# Patient Record
Sex: MALE | Race: WHITE | NOT HISPANIC OR LATINO | Employment: UNEMPLOYED | URBAN - METROPOLITAN AREA
[De-identification: names, ages, dates, MRNs, and addresses within clinical notes are randomized per-mention and may not be internally consistent; named-entity substitution may affect disease eponyms.]

---

## 2017-06-14 ENCOUNTER — HOSPITAL ENCOUNTER (EMERGENCY)
Facility: MEDICAL CENTER | Age: 52
End: 2017-06-14
Attending: EMERGENCY MEDICINE

## 2017-06-14 VITALS
OXYGEN SATURATION: 96 % | WEIGHT: 127.43 LBS | HEIGHT: 70 IN | RESPIRATION RATE: 15 BRPM | SYSTOLIC BLOOD PRESSURE: 164 MMHG | TEMPERATURE: 96.8 F | BODY MASS INDEX: 18.24 KG/M2 | HEART RATE: 101 BPM | DIASTOLIC BLOOD PRESSURE: 84 MMHG
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
## 2017-06-14 DIAGNOSIS — L03.113 CELLULITIS OF RIGHT UPPER EXTREMITY: ICD-10-CM

## 2017-06-14 PROCEDURE — 99283 EMERGENCY DEPT VISIT LOW MDM: CPT

## 2017-06-14 RX ORDER — AMOXICILLIN 500 MG/1
500 CAPSULE ORAL 3 TIMES DAILY
Qty: 21 CAP | Refills: 0 | Status: SHIPPED | OUTPATIENT
Start: 2017-06-14 | End: 2017-06-21

## 2017-06-14 RX ORDER — BENZOCAINE/MENTHOL 6 MG-10 MG
1 LOZENGE MUCOUS MEMBRANE 2 TIMES DAILY
Qty: 1 TUBE | Refills: 0 | Status: SHIPPED | OUTPATIENT
Start: 2017-06-14 | End: 2017-12-28

## 2017-06-14 RX ORDER — SULFAMETHOXAZOLE AND TRIMETHOPRIM 800; 160 MG/1; MG/1
1 TABLET ORAL 2 TIMES DAILY
Qty: 14 TAB | Refills: 0 | Status: SHIPPED | OUTPATIENT
Start: 2017-06-14 | End: 2017-06-21

## 2017-06-14 ASSESSMENT — LIFESTYLE VARIABLES: DO YOU DRINK ALCOHOL: NO

## 2017-06-14 ASSESSMENT — PAIN SCALES - GENERAL: PAINLEVEL_OUTOF10: 3

## 2017-06-14 NOTE — ED PROVIDER NOTES
"ED Provider Note    Scribed for Vera Elena M.D. by Jessie Hussein. 6/14/2017  6:12 AM    Primary care provider: Pcp Pt States None  Means of arrival: Walk-in  History obtained from: Patient  History limited by: None    CHIEF COMPLAINT  Chief Complaint   Patient presents with   • Bug Bite     Pt first noticed bite on Sunday; increased erythema to right medial upper arm, edema and buring sensation.       HPI  Jhon Aguirre is a 52 y.o. male who presents to the Emergency Department for evaluation of bug bite to his right upper arm onset four days ago. Per patient, he was working at the [employer] up [place] when a wasp bit him twice. He reports the initially feeling \"burning\" but now area just itches constantly. Patient states it burns a little when he touches the area. He reports the area turning red and spreading across his upper right arm yesterday so he came into the ED for further evaluation. Patient denies any recent fevers. He is not allergic to any medications.     REVIEW OF SYSTEMS  HEENT:  No ear pain, congestion, or sore throat   EYES: no discharge, redness, or vision changes  CARDIAC: no chest pain, no palpitations    PULMONARY: no dyspnea, cough, or congestion   GI: no vomiting, diarrhea, or abdominal pain   : no dysuria, back pain, or hematuria   Neuro: no weakness, numbness, aphasia, or headache  Musculoskeletal: no swelling, deformity, pain, or joint swelling  Endocrine: no fevers, sweating, or weight loss   SKIN: erythema to right upper arm and ithcing, no rash or contusions     See history of present illness.     PAST MEDICAL HISTORY   has a past medical history of Renal failure (2010); Pain; and Dental disorder.    SURGICAL HISTORY   has past surgical history that includes other; radius ulna orif (10/23/2011); and inguinal hernia repair (3/14/2014).    SOCIAL HISTORY  Social History   Substance Use Topics   • Smoking status: Current Every Day Smoker -- 0.50 packs/day for 20 years " "    Types: Cigarettes   • Smokeless tobacco: Never Used   • Alcohol Use: No      History   Drug Use No       FAMILY HISTORY  No pertinent family history    CURRENT MEDICATIONS  Home Medications     Reviewed by Chitra Augustine R.N. (Registered Nurse) on 06/14/17 at 0611  Med List Status: <None>    Medication Last Dose Status    Ibuprofen-Diphenhydramine Cit (ADVIL PM PO) Not Taking Active    naproxen (ALEVE) 220 MG tablet Not Taking Active    oxycodone-acetaminophen (PERCOCET) 7.5-325 MG per tablet 3/15/2014 Active                ALLERGIES  No Known Allergies    PHYSICAL EXAM  VITAL SIGNS: /84 mmHg  Pulse 101  Temp(Src) 36 °C (96.8 °F)  Resp 15  Ht 1.778 m (5' 10\")  Wt 57.8 kg (127 lb 6.8 oz)  BMI 18.28 kg/m2  SpO2 96%    Constitutional: Well developed, Well nourished, No acute distress, Non-toxic appearance.   HEENT: Normocephalic, Atraumatic,  external ears normal, pharynx pink,  Mucous  Membranes moist, No rhinorrhea or mucosal edema  Eyes: PERRL, EOMI, Conjunctiva normal, No discharge.   Neck: Normal range of motion, No tenderness, Supple, No stridor.   Lymphatic: No lymphadenopathy    Cardiovascular: Regular Rate and Rhythm, No murmurs,  rubs, or gallops.   Thorax & Lungs: Lungs clear to auscultation bilaterally, No respiratory distress, No wheezes, rhales or rhonchi, No chest wall tenderness.   Abdomen: Bowel sounds normal, Soft, non tender, non distended,  No pulsatile masses., no rebound guarding or peritoneal signs.   Skin: Right upper medial arm side of bicep has 7 by 4cm area of erythema, it is raised and dry and warm. No fluctuant, no purulent drainage. Bite alan more distal on elbow also no fluctuant or purulent drainage, no joint swelling.   Extremities: Equal, intact distal pulses, No cyanosis, No tenderness.   Musculoskeletal: Good range of motion in all major joints. No tenderness to palpation or major deformities noted.   Neurologic: Alert & awake, no focal deficits  Psychiatric: " Affect normal    COURSE & MEDICAL DECISION MAKING  Nursing notes, VS, PMSFHx reviewed in chart.     6:12 AM - Patient seen and examined at bedside. Patient has with symptoms consistent to secondary infection to insect bite. He will be discharged home with prescription for hydrocortisone 1% cream, 800-160mg Bactrim DS tablet and 500mg Amoxicillin for treatment. He was instructed to follow up care with [facility] or return to the ED if symptoms worsen. Patient understood and was in agreement with this discharge plan.     The patient will return for new or worsening symptoms and is stable at the time of discharge.    Patient has had high blood pressure while in the emergency department, felt likely secondary to medical condition. Counseled patient to monitor blood pressure at home and follow up with primary care physician.    DISPOSITION:  Patient will be discharged home in stable condition.    FOLLOW UP:  [address] [address] [phone]  Call in 1 day  to establish care, for recheck      OUTPATIENT MEDICATIONS:  Discharge Medication List as of 6/14/2017  6:18 AM      START taking these medications    Details   hydrocortisone 1 % Cream Apply 1 Each to affected area(s) 2 times a day., Disp-1 Tube, R-0, Print Rx Paper      sulfamethoxazole-trimethoprim (BACTRIM DS) 800-160 MG tablet Take 1 Tab by mouth 2 times a day for 7 days., Disp-14 Tab, R-0, Print Rx Paper      amoxicillin (AMOXIL) 500 MG Cap Take 1 Cap by mouth 3 times a day for 7 days., Disp-21 Cap, R-0, Print Rx Paper             FINAL IMPRESSION  1. Insect bite, initial encounter    2. Cellulitis of right upper extremity          Jessie MULTANI ([place]), am scribing for, and in the presence of, Vera Elena M.D..    Electronically signed by: Jessie Elder), 6/14/2017    Vera MULTANI M.D. personally performed the services described in this documentation, as scribed  by Jessie Hussein in my presence, and it is both accurate and complete.    The note accurately reflects work and decisions made by me.  Vera Elena  6/14/2017  12:57 PM

## 2017-06-14 NOTE — ED AVS SNAPSHOT
Home Care Instructions                                                                                                                Jhon [place]   MRN: [id number]    Department:  [facility], Emergency Dept   Date of Visit:  6/14/2017            [facility], Emergency Dept    [address] [address]    Phone:  [phone]      You were seen by     Vera Elena M.D.      Your Diagnosis Was     Insect bite, initial encounter     W57.XXXA       Follow-up Information     [facility].    Why:  to establish care, for recheck    Contact information    [address] [place] [address] [phone]    Additional information:    [facility]      Medication Information     Review all of your home medications and newly ordered medications with your primary doctor and/or pharmacist as soon as possible. Follow medication instructions as directed by your doctor and/or pharmacist.     Please keep your complete medication list with you and share with your physician. Update the information when medications are discontinued, doses are changed, or new medications (including over-the-counter products) are added; and carry medication information at all times in the event of emergency situations.               Medication List      START taking these medications        Instructions    Morning Afternoon Evening Bedtime    amoxicillin 500 MG Caps   Commonly known as:  AMOXIL        Take 1 Cap by mouth 3 times a day for 7 days.   Dose:  500 mg                        hydrocortisone 1 % Crea        Apply 1 Each to affected area(s) 2 times a day.   Dose:  1 Each                        sulfamethoxazole-trimethoprim 800-160 MG tablet   Commonly known as:  BACTRIM DS        Take 1 Tab by mouth 2 times a day for 7 days.   Dose:  1 Tab                          ASK your doctor about these medications        Instructions      Morning Afternoon Evening Bedtime    ADVIL PM PO        Take  by mouth every bedtime.                        ALEVE 220 MG tablet   Generic drug:  naproxen        Take 220 mg by mouth 2 times a day, with meals.   Dose:  220 mg                        oxycodone-acetaminophen 7.5-325 MG per tablet   Commonly known as:  PERCOCET        Take 1 Tab by mouth every four hours as needed.   Dose:  1 Tab                             Where to Get Your Medications      You can get these medications from any pharmacy     Bring a paper prescription for each of these medications    - amoxicillin 500 MG Caps  - hydrocortisone 1 % Crea  - sulfamethoxazole-trimethoprim 800-160 MG tablet              Discharge Instructions       Cellulitis  Cellulitis is an infection of the skin and the tissue beneath it. The infected area is usually red and tender. Cellulitis occurs most often in the arms and lower legs.   CAUSES   Cellulitis is caused by bacteria that enter the skin through cracks or cuts in the skin. The most common types of bacteria that cause cellulitis are staphylococci and streptococci.  SIGNS AND SYMPTOMS   · Redness and warmth.  · Swelling.  · Tenderness or pain.  · Fever.  DIAGNOSIS   Your health care provider can usually determine what is wrong based on a physical exam. Blood tests may also be done.  TREATMENT   Treatment usually involves taking an antibiotic medicine.  HOME CARE INSTRUCTIONS   · Take your antibiotic medicine as directed by your health care provider. Finish the antibiotic even if you start to feel better.  · Keep the infected arm or leg elevated to reduce swelling.  · Apply a warm cloth to the affected area up to 4 times per day to relieve pain.  · Take medicines only as directed by your health care provider.  · Keep all follow-up visits as directed by your health care provider.  SEEK MEDICAL CARE IF:   · You notice red streaks coming from the infected area.  · Your red area gets larger or turns dark in  color.  · Your bone or joint underneath the infected area becomes painful after the skin has healed.  · Your infection returns in the same area or another area.  · You notice a swollen bump in the infected area.  · You develop new symptoms.  · You have a fever.  SEEK IMMEDIATE MEDICAL CARE IF:   · You feel very sleepy.  · You develop vomiting or diarrhea.  · You have a general ill feeling (malaise) with muscle aches and pains.  MAKE SURE YOU:   · Understand these instructions.  · Will watch your condition.  · Will get help right away if you are not doing well or get worse.     This information is not intended to replace advice given to you by your health care provider. Make sure you discuss any questions you have with your health care provider.     Document Released: 09/27/2006 Document Revised: 01/08/2016 Document Reviewed: 03/04/2013  [employer] Interactive Patient Education ©2016 [employer]    Insect Bite  Mosquitoes, flies, fleas, bedbugs, and other insects can bite. Insect bites are different from insect stings. The bite may be red, puffy (swollen), and itchy for 2 to 4 days. Most bites get better on their own.  HOME CARE   · Do not scratch the bite.  · Keep the bite clean and dry. Wash the bite with soap and water.  · Put ice on the bite.  ¨ Put ice in a plastic bag.  ¨ Place a towel between your skin and the bag.  ¨ Leave the ice on for 20 minutes, 4 times a day. Do this for the first 2 to 3 days, or as told by your doctor.  · You may use medicated lotions or creams to lessen itching as told by your doctor.  · Only take medicines as told by your doctor.  · If you are given medicines (antibiotics), take them as told. Finish them even if you start to feel better.  You may need a tetanus shot if:  · You cannot remember when you had your last tetanus shot.  · You have never had a tetanus shot.  · The injury broke your skin.  If you need a tetanus shot and you choose not to have one, you may get tetanus. Sickness  from tetanus can be serious.  GET HELP RIGHT AWAY IF:   · You have more pain, redness, or puffiness.  · You see a red line on the skin coming from the bite.  · You have a fever.  · You have joint pain.  · You have a headache or neck pain.  · You feel weak.  · You have a rash.  · You have chest pain, or you are short of breath.  · You have belly (abdominal) pain.  · You feel sick to your stomach (nauseous) or throw up (vomit).  · You feel very tired or sleepy.  MAKE SURE YOU:   · Understand these instructions.  · Will watch your condition.  · Will get help right away if you are not doing well or get worse.     This information is not intended to replace advice given to you by your health care provider. Make sure you discuss any questions you have with your health care provider.     Document Released: 12/15/2001 Document Revised: 03/11/2013 Document Reviewed: 07/18/2012  [employer] Interactive Patient Education ©2016 [employer]            Patient Information     Patient Information    Following emergency treatment: all patient requiring follow-up care must return either to a private physician or a clinic if your condition worsens before you are able to obtain further medical attention, please return to the emergency room.     Billing Information    At [facility], we work to make the billing process streamlined for our patients.  Our Representatives are here to answer any questions you may have regarding your hospital bill.  If you have insurance coverage and have supplied your insurance information to us, we will submit a claim to your insurer on your behalf.  Should you have any questions regarding your bill, we can be reached online or by phone as follows:  Online: You are able pay your bills online or live chat with our representatives about any billing questions you may have. We are here to help Monday - Friday from 8:00am to 7:30pm and 9:00am - 12:00pm on Saturdays.  Please visit  [email or web address]  for more information.   Phone:  [phone] or [phone]    Please note that your emergency physician, surgeon, pathologist, radiologist, anesthesiologist, and other specialists are not employed by [facility] and will therefore bill separately for their services.  Please contact them directly for any questions concerning their bills at the numbers below:     Emergency Physician Services:  [phone]  [facility]:  [phone]  [facility]:  [phone]  [facility]:  [phone]    1. Your final bill may vary from the amount quoted upon discharge if all procedures are not complete at that time, or if your doctor has additional procedures of which we are not aware. You will receive an additional bill if you return to the Emergency Department at [facility] for suture removal regardless of the facility of which the sutures were placed.     2. Please arrange for settlement of this account at the emergency registration.    3. All self-pay accounts are due in full at the time of treatment.  If you are unable to meet this obligation then payment is expected within 4-5 days.     4. If you have had radiology studies (CT, X-ray, Ultrasound, MRI), you have received a preliminary result during your emergency department visit. Please contact the radiology department [phone] to receive a copy of your final result. Please discuss the Final result with your primary physician or with the follow up physician provided.     Crisis Hotline:  [place] Crisis Hotline:  9-886-LTXOUCZ or 1-505.209.4026  [place] Crisis Hotline:    1-385.418.2594 or 421-066-7595         ED Discharge Follow Up Questions    1. In order to provide you with very good care, we would like to follow up with a phone call in the next few days.  May we have your permission to contact you?     YES /  NO    2. What is the best phone number to call  you? (       )_____-__________    3. What is the best time to call you?      Morning  /  Afternoon  /  Evening                   Patient Signature:  ____________________________________________________________    Date:  ____________________________________________________________

## 2017-06-14 NOTE — ED AVS SNAPSHOT
[employer] Access Code: [email or web address]  Expires: 7/14/2017  6:18 AM    [employer] [place]  A secure, online tool to manage your health information     [employer]’s [employer]® is a secure, online tool that connects you to your personalized health information from the privacy of your home -- day or night - making it very easy for you to manage your healthcare. Once the activation process is completed, you can even access your medical information using the [employer] marlena, which is available for free in the Apple Marlena store or Google Play store.     [employer] provides the following levels of access (as shown below):   My Chart Features   [facility] Primary Care Doctor [facility]  Specialists [facility]  Urgent  Care [facility]  Primary Care  Doctor   Email your healthcare team securely and privately 24/7 X X X X   Manage appointments: schedule your next appointment; view details of past/upcoming appointments X      Request prescription refills. X      View recent personal medical records, including lab and immunizations X X X X   View health record, including health history, allergies, medications X X X X   Read reports about your outpatient visits, procedures, consult and ER notes X X X X   See your discharge summary, which is a recap of your hospital and/or ER visit that includes your diagnosis, lab results, and care plan. X X       How to register for [employer]:  1. Go to  [email or web address].  2. Click on the Sign Up Now box, which takes you to the New Member Sign Up page. You will need to provide the following information:  a. Enter your [employer] Access Code exactly as it appears at the top of this page. (You will not need to use this code after you’ve completed the sign-up process. If you do not sign up before the expiration date, you must request a new code.)   b. Enter your date of birth.   c. Enter your home email address.   d. Click Submit, and follow the next screen’s instructions.  3. Create a [employer] [place] ID. This will be your [employer]  login ID and cannot be changed, so think of one that is secure and easy to remember.  4. Create a [employer] password. You can change your password at any time.  5. Enter your Password Reset Question and Answer. This can be used at a later time if you forget your password.   6. Enter your e-mail address. This allows you to receive e-mail notifications when new information is available in [employer].  7. Click Sign Up. You can now view your health information.    For assistance activating your [employer] account, call [phone]

## 2017-06-14 NOTE — ED AVS SNAPSHOT
6/14/2017    [place] [address] [address] [address]    Dear Jhon:    [facility] wants to ensure your discharge home is safe and you or your loved ones have had all of your questions answered regarding your care after you leave the hospital.    Below is a list of resources and contact information should you have any questions regarding your hospital stay, follow-up instructions, or active medical symptoms.    Questions or Concerns Regarding… Contact   Medical Questions Related to Your Discharge  (7 days a week, 8am-5pm) Contact a Nurse Care Coordinator   [phone]   Medical Questions Not Related to Your Discharge  (24 hours a day / 7 days a week)  Contact the Nurse Health Line   [phone]    Medications or Discharge Instructions Refer to your discharge packet   or contact your [facility] Primary Care Provider   [phone]   Follow-up Appointment(s) Schedule your appointment via SQMOS   or contact Scheduling [phone]   Billing Review your statement via SQMOS  or contact Billing [phone]   Medical Records Review your records via SQMOS   or contact Medical Records [phone]     You may receive a telephone call within two days of discharge. This call is to make certain you understand your discharge instructions and have the opportunity to have any questions answered. You can also easily access your medical information, test results and upcoming appointments via the [employer]MOS free online health management tool. You can learn more and sign up at [email or web address]. For assistance setting up your S[employer] account, please call [phone].    Once again, we want to ensure your discharge home is safe and that you have a clear understanding of any next steps in your care. If you have any questions or concerns, please do not hesitate to contact us, we are here for you. Thank you for choosing [facility] for your healthcare needs.    Sincerely,    Your [facility] Healthcare Team

## 2017-06-14 NOTE — ED NOTES
Patient ambulatory to triage:  Chief Complaint   Patient presents with   • Bug Bite     Pt first noticed bite on Sunday; increased erythema to right medial upper arm, edema and buring sensation.     Explained wait time and triage process to pt. Pt placed back out in lobby, told to notify ED tech or triage RN of any changes, verbalized understanding.

## 2017-06-14 NOTE — DISCHARGE INSTRUCTIONS
Cellulitis  Cellulitis is an infection of the skin and the tissue beneath it. The infected area is usually red and tender. Cellulitis occurs most often in the arms and lower legs.   CAUSES   Cellulitis is caused by bacteria that enter the skin through cracks or cuts in the skin. The most common types of bacteria that cause cellulitis are staphylococci and streptococci.  SIGNS AND SYMPTOMS   · Redness and warmth.  · Swelling.  · Tenderness or pain.  · Fever.  DIAGNOSIS   Your health care provider can usually determine what is wrong based on a physical exam. Blood tests may also be done.  TREATMENT   Treatment usually involves taking an antibiotic medicine.  HOME CARE INSTRUCTIONS   · Take your antibiotic medicine as directed by your health care provider. Finish the antibiotic even if you start to feel better.  · Keep the infected arm or leg elevated to reduce swelling.  · Apply a warm cloth to the affected area up to 4 times per day to relieve pain.  · Take medicines only as directed by your health care provider.  · Keep all follow-up visits as directed by your health care provider.  SEEK MEDICAL CARE IF:   · You notice red streaks coming from the infected area.  · Your red area gets larger or turns dark in color.  · Your bone or joint underneath the infected area becomes painful after the skin has healed.  · Your infection returns in the same area or another area.  · You notice a swollen bump in the infected area.  · You develop new symptoms.  · You have a fever.  SEEK IMMEDIATE MEDICAL CARE IF:   · You feel very sleepy.  · You develop vomiting or diarrhea.  · You have a general ill feeling (malaise) with muscle aches and pains.  MAKE SURE YOU:   · Understand these instructions.  · Will watch your condition.  · Will get help right away if you are not doing well or get worse.     This information is not intended to replace advice given to you by your health care provider. Make sure you discuss any questions you have  with your health care provider.     Document Released: 09/27/2006 Document Revised: 01/08/2016 Document Reviewed: 03/04/2013  [employer] Interactive Patient Education ©2016 [employer]    Insect Bite  Mosquitoes, flies, fleas, bedbugs, and other insects can bite. Insect bites are different from insect stings. The bite may be red, puffy (swollen), and itchy for 2 to 4 days. Most bites get better on their own.  HOME CARE   · Do not scratch the bite.  · Keep the bite clean and dry. Wash the bite with soap and water.  · Put ice on the bite.  ¨ Put ice in a plastic bag.  ¨ Place a towel between your skin and the bag.  ¨ Leave the ice on for 20 minutes, 4 times a day. Do this for the first 2 to 3 days, or as told by your doctor.  · You may use medicated lotions or creams to lessen itching as told by your doctor.  · Only take medicines as told by your doctor.  · If you are given medicines (antibiotics), take them as told. Finish them even if you start to feel better.  You may need a tetanus shot if:  · You cannot remember when you had your last tetanus shot.  · You have never had a tetanus shot.  · The injury broke your skin.  If you need a tetanus shot and you choose not to have one, you may get tetanus. Sickness from tetanus can be serious.  GET HELP RIGHT AWAY IF:   · You have more pain, redness, or puffiness.  · You see a red line on the skin coming from the bite.  · You have a fever.  · You have joint pain.  · You have a headache or neck pain.  · You feel weak.  · You have a rash.  · You have chest pain, or you are short of breath.  · You have belly (abdominal) pain.  · You feel sick to your stomach (nauseous) or throw up (vomit).  · You feel very tired or sleepy.  MAKE SURE YOU:   · Understand these instructions.  · Will watch your condition.  · Will get help right away if you are not doing well or get worse.     This information is not intended to replace advice given to you by your health care provider. Make sure  you discuss any questions you have with your health care provider.     Document Released: 12/15/2001 Document Revised: 03/11/2013 Document Reviewed: 07/18/2012  [employer]vier Interactive Patient Education ©2016 [employer]

## 2017-12-28 ENCOUNTER — RESOLUTE PROFESSIONAL BILLING HOSPITAL PROF FEE (OUTPATIENT)
Dept: HOSPITALIST | Facility: MEDICAL CENTER | Age: 52
End: 2017-12-28
Payer: MEDICAID

## 2017-12-28 ENCOUNTER — APPOINTMENT (OUTPATIENT)
Dept: RADIOLOGY | Facility: MEDICAL CENTER | Age: 52
DRG: 811 | End: 2017-12-28
Attending: EMERGENCY MEDICINE
Payer: MEDICAID

## 2017-12-28 ENCOUNTER — HOSPITAL ENCOUNTER (INPATIENT)
Facility: MEDICAL CENTER | Age: 52
LOS: 2 days | DRG: 811 | End: 2017-12-30
Attending: EMERGENCY MEDICINE | Admitting: INTERNAL MEDICINE
Payer: MEDICAID

## 2017-12-28 DIAGNOSIS — D64.9 ANEMIA, UNSPECIFIED TYPE: ICD-10-CM

## 2017-12-28 DIAGNOSIS — E87.1 HYPONATREMIA: ICD-10-CM

## 2017-12-28 DIAGNOSIS — E83.42 HYPOMAGNESEMIA: ICD-10-CM

## 2017-12-28 DIAGNOSIS — J10.1 INFLUENZA B: ICD-10-CM

## 2017-12-28 DIAGNOSIS — R50.9 FEVER, UNSPECIFIED FEVER CAUSE: ICD-10-CM

## 2017-12-28 DIAGNOSIS — E87.6 HYPOKALEMIA: ICD-10-CM

## 2017-12-28 DIAGNOSIS — J18.9 MULTIFOCAL PNEUMONIA: ICD-10-CM

## 2017-12-28 LAB
ABO GROUP BLD: NORMAL
ABO GROUP BLD: NORMAL
ALBUMIN SERPL BCP-MCNC: 3.3 G/DL (ref 3.2–4.9)
ALBUMIN/GLOB SERPL: 0.7 G/DL
ALP SERPL-CCNC: 41 U/L (ref 30–99)
ALT SERPL-CCNC: 18 U/L (ref 2–50)
AMPHET UR QL SCN: NEGATIVE
ANION GAP SERPL CALC-SCNC: 14 MMOL/L (ref 0–11.9)
ANISOCYTOSIS BLD QL SMEAR: ABNORMAL
APPEARANCE UR: CLEAR
APTT PPP: 36.8 SEC (ref 24.7–36)
AST SERPL-CCNC: 49 U/L (ref 12–45)
BARBITURATES UR QL SCN: NEGATIVE
BARCODED ABORH UBTYP: 5100
BARCODED ABORH UBTYP: 9500
BARCODED PRD CODE UBPRD: NORMAL
BARCODED PRD CODE UBPRD: NORMAL
BARCODED UNIT NUM UBUNT: NORMAL
BARCODED UNIT NUM UBUNT: NORMAL
BASOPHILS # BLD AUTO: 0 % (ref 0–1.8)
BASOPHILS # BLD: 0 K/UL (ref 0–0.12)
BENZODIAZ UR QL SCN: NEGATIVE
BILIRUB SERPL-MCNC: 0.6 MG/DL (ref 0.1–1.5)
BILIRUB UR QL STRIP.AUTO: NEGATIVE
BLD GP AB SCN SERPL QL: NORMAL
BNP SERPL-MCNC: 63 PG/ML (ref 0–100)
BUN SERPL-MCNC: 15 MG/DL (ref 8–22)
BZE UR QL SCN: NEGATIVE
CALCIUM SERPL-MCNC: 8.3 MG/DL (ref 8.5–10.5)
CANNABINOIDS UR QL SCN: POSITIVE
CHLORIDE SERPL-SCNC: 91 MMOL/L (ref 96–112)
CO2 SERPL-SCNC: 20 MMOL/L (ref 20–33)
COLOR UR: YELLOW
COMPONENT R 8504R: NORMAL
COMPONENT R 8504R: NORMAL
CREAT SERPL-MCNC: 0.72 MG/DL (ref 0.5–1.4)
EKG IMPRESSION: NORMAL
EOSINOPHIL # BLD AUTO: 0 K/UL (ref 0–0.51)
EOSINOPHIL NFR BLD: 0 % (ref 0–6.9)
ERYTHROCYTE [DISTWIDTH] IN BLOOD BY AUTOMATED COUNT: 47.1 FL (ref 35.9–50)
ERYTHROCYTE [DISTWIDTH] IN BLOOD BY AUTOMATED COUNT: 65.3 FL (ref 35.9–50)
ETHANOL BLD-MCNC: 0 G/DL
FLUAV RNA SPEC QL NAA+PROBE: NEGATIVE
FLUBV RNA SPEC QL NAA+PROBE: POSITIVE
GFR SERPL CREATININE-BSD FRML MDRD: >60 ML/MIN/1.73 M 2
GLOBULIN SER CALC-MCNC: 4.6 G/DL (ref 1.9–3.5)
GLUCOSE BLD-MCNC: 107 MG/DL (ref 65–99)
GLUCOSE BLD-MCNC: 120 MG/DL (ref 65–99)
GLUCOSE BLD-MCNC: 131 MG/DL (ref 65–99)
GLUCOSE SERPL-MCNC: 92 MG/DL (ref 65–99)
GLUCOSE UR STRIP.AUTO-MCNC: NEGATIVE MG/DL
HCT VFR BLD AUTO: 19.5 % (ref 42–52)
HCT VFR BLD AUTO: 27.7 % (ref 42–52)
HGB BLD-MCNC: 5.5 G/DL (ref 14–18)
HGB BLD-MCNC: 8.2 G/DL (ref 14–18)
HYPOCHROMIA BLD QL SMEAR: ABNORMAL
INR PPP: 1.07 (ref 0.87–1.13)
KETONES UR STRIP.AUTO-MCNC: 15 MG/DL
LACTATE BLD-SCNC: 1.6 MMOL/L (ref 0.5–2)
LEUKOCYTE ESTERASE UR QL STRIP.AUTO: NEGATIVE
LYMPHOCYTES # BLD AUTO: 0.75 K/UL (ref 1–4.8)
LYMPHOCYTES NFR BLD: 10.5 % (ref 22–41)
MAGNESIUM SERPL-MCNC: 1.4 MG/DL (ref 1.5–2.5)
MAGNESIUM SERPL-MCNC: 1.6 MG/DL (ref 1.5–2.5)
MANUAL DIFF BLD: NORMAL
MCH RBC QN AUTO: 17.9 PG (ref 27–33)
MCH RBC QN AUTO: 20.9 PG (ref 27–33)
MCHC RBC AUTO-ENTMCNC: 28.2 G/DL (ref 33.7–35.3)
MCHC RBC AUTO-ENTMCNC: 29.6 G/DL (ref 33.7–35.3)
MCV RBC AUTO: 63.3 FL (ref 81.4–97.8)
MCV RBC AUTO: 70.7 FL (ref 81.4–97.8)
METHADONE UR QL SCN: NEGATIVE
MICRO URNS: ABNORMAL
MICROCYTES BLD QL SMEAR: ABNORMAL
MONOCYTES # BLD AUTO: 0.69 K/UL (ref 0–0.85)
MONOCYTES NFR BLD AUTO: 9.7 % (ref 0–13.4)
MORPHOLOGY BLD-IMP: NORMAL
NEUTROPHILS # BLD AUTO: 5.67 K/UL (ref 1.82–7.42)
NEUTROPHILS NFR BLD: 79.8 % (ref 44–72)
NITRITE UR QL STRIP.AUTO: NEGATIVE
NRBC # BLD AUTO: 0.02 K/UL
NRBC BLD-RTO: 0.3 /100 WBC
OPIATES UR QL SCN: NEGATIVE
OXYCODONE UR QL SCN: NEGATIVE
PCP UR QL SCN: NEGATIVE
PH UR STRIP.AUTO: 6.5 [PH]
PHOSPHATE SERPL-MCNC: 2.6 MG/DL (ref 2.5–4.5)
PLATELET # BLD AUTO: 255 K/UL (ref 164–446)
PLATELET # BLD AUTO: 290 K/UL (ref 164–446)
PLATELET BLD QL SMEAR: NORMAL
PMV BLD AUTO: 9.2 FL (ref 9–12.9)
PMV BLD AUTO: 9.7 FL (ref 9–12.9)
POIKILOCYTOSIS BLD QL SMEAR: NORMAL
POTASSIUM SERPL-SCNC: 3.1 MMOL/L (ref 3.6–5.5)
PROCALCITONIN SERPL-MCNC: 1.12 NG/ML
PRODUCT TYPE UPROD: NORMAL
PRODUCT TYPE UPROD: NORMAL
PROPOXYPH UR QL SCN: NEGATIVE
PROT SERPL-MCNC: 7.9 G/DL (ref 6–8.2)
PROT UR QL STRIP: NEGATIVE MG/DL
PROTHROMBIN TIME: 13.6 SEC (ref 12–14.6)
RBC # BLD AUTO: 3.08 M/UL (ref 4.7–6.1)
RBC # BLD AUTO: 3.92 M/UL (ref 4.7–6.1)
RBC BLD AUTO: PRESENT
RBC UR QL AUTO: NEGATIVE
RH BLD: NORMAL
SODIUM SERPL-SCNC: 125 MMOL/L (ref 135–145)
SP GR UR STRIP.AUTO: 1.02
TROPONIN I SERPL-MCNC: 0.01 NG/ML (ref 0–0.04)
TROPONIN I SERPL-MCNC: 0.01 NG/ML (ref 0–0.04)
TSH SERPL DL<=0.005 MIU/L-ACNC: 1.09 UIU/ML (ref 0.38–5.33)
UNIT STATUS USTAT: NORMAL
UNIT STATUS USTAT: NORMAL
UROBILINOGEN UR STRIP.AUTO-MCNC: 2 MG/DL
WBC # BLD AUTO: 5.9 K/UL (ref 4.8–10.8)
WBC # BLD AUTO: 7.1 K/UL (ref 4.8–10.8)

## 2017-12-28 PROCEDURE — P9016 RBC LEUKOCYTES REDUCED: HCPCS | Mod: 91

## 2017-12-28 PROCEDURE — 82962 GLUCOSE BLOOD TEST: CPT

## 2017-12-28 PROCEDURE — 85027 COMPLETE CBC AUTOMATED: CPT

## 2017-12-28 PROCEDURE — 96367 TX/PROPH/DG ADDL SEQ IV INF: CPT

## 2017-12-28 PROCEDURE — 87502 INFLUENZA DNA AMP PROBE: CPT

## 2017-12-28 PROCEDURE — 86850 RBC ANTIBODY SCREEN: CPT

## 2017-12-28 PROCEDURE — 87040 BLOOD CULTURE FOR BACTERIA: CPT | Mod: 91

## 2017-12-28 PROCEDURE — 84145 PROCALCITONIN (PCT): CPT

## 2017-12-28 PROCEDURE — 86923 COMPATIBILITY TEST ELECTRIC: CPT

## 2017-12-28 PROCEDURE — 86900 BLOOD TYPING SEROLOGIC ABO: CPT

## 2017-12-28 PROCEDURE — 700105 HCHG RX REV CODE 258: Performed by: EMERGENCY MEDICINE

## 2017-12-28 PROCEDURE — 96361 HYDRATE IV INFUSION ADD-ON: CPT

## 2017-12-28 PROCEDURE — 96375 TX/PRO/DX INJ NEW DRUG ADDON: CPT

## 2017-12-28 PROCEDURE — 81003 URINALYSIS AUTO W/O SCOPE: CPT

## 2017-12-28 PROCEDURE — 86901 BLOOD TYPING SEROLOGIC RH(D): CPT

## 2017-12-28 PROCEDURE — 90732 PPSV23 VACC 2 YRS+ SUBQ/IM: CPT | Performed by: FAMILY MEDICINE

## 2017-12-28 PROCEDURE — 700101 HCHG RX REV CODE 250: Performed by: EMERGENCY MEDICINE

## 2017-12-28 PROCEDURE — 96365 THER/PROPH/DIAG IV INF INIT: CPT

## 2017-12-28 PROCEDURE — 36415 COLL VENOUS BLD VENIPUNCTURE: CPT

## 2017-12-28 PROCEDURE — 302128 INFUSION PUMP: Performed by: EMERGENCY MEDICINE

## 2017-12-28 PROCEDURE — 80307 DRUG TEST PRSMV CHEM ANLYZR: CPT

## 2017-12-28 PROCEDURE — 84484 ASSAY OF TROPONIN QUANT: CPT

## 2017-12-28 PROCEDURE — 99285 EMERGENCY DEPT VISIT HI MDM: CPT

## 2017-12-28 PROCEDURE — 770020 HCHG ROOM/CARE - TELE (206)

## 2017-12-28 PROCEDURE — 85730 THROMBOPLASTIN TIME PARTIAL: CPT

## 2017-12-28 PROCEDURE — 700102 HCHG RX REV CODE 250 W/ 637 OVERRIDE(OP): Performed by: EMERGENCY MEDICINE

## 2017-12-28 PROCEDURE — 36430 TRANSFUSION BLD/BLD COMPNT: CPT

## 2017-12-28 PROCEDURE — 93005 ELECTROCARDIOGRAM TRACING: CPT | Performed by: EMERGENCY MEDICINE

## 2017-12-28 PROCEDURE — A9270 NON-COVERED ITEM OR SERVICE: HCPCS | Performed by: FAMILY MEDICINE

## 2017-12-28 PROCEDURE — A9270 NON-COVERED ITEM OR SERVICE: HCPCS | Performed by: EMERGENCY MEDICINE

## 2017-12-28 PROCEDURE — 85007 BL SMEAR W/DIFF WBC COUNT: CPT

## 2017-12-28 PROCEDURE — 83605 ASSAY OF LACTIC ACID: CPT

## 2017-12-28 PROCEDURE — 84100 ASSAY OF PHOSPHORUS: CPT

## 2017-12-28 PROCEDURE — 700102 HCHG RX REV CODE 250 W/ 637 OVERRIDE(OP): Performed by: FAMILY MEDICINE

## 2017-12-28 PROCEDURE — 90471 IMMUNIZATION ADMIN: CPT

## 2017-12-28 PROCEDURE — 83735 ASSAY OF MAGNESIUM: CPT

## 2017-12-28 PROCEDURE — 80053 COMPREHEN METABOLIC PANEL: CPT

## 2017-12-28 PROCEDURE — 700101 HCHG RX REV CODE 250: Performed by: FAMILY MEDICINE

## 2017-12-28 PROCEDURE — 71010 DX-CHEST-PORTABLE (1 VIEW): CPT

## 2017-12-28 PROCEDURE — 87086 URINE CULTURE/COLONY COUNT: CPT

## 2017-12-28 PROCEDURE — 99223 1ST HOSP IP/OBS HIGH 75: CPT | Performed by: FAMILY MEDICINE

## 2017-12-28 PROCEDURE — 94640 AIRWAY INHALATION TREATMENT: CPT

## 2017-12-28 PROCEDURE — 700105 HCHG RX REV CODE 258: Performed by: FAMILY MEDICINE

## 2017-12-28 PROCEDURE — 84443 ASSAY THYROID STIM HORMONE: CPT

## 2017-12-28 PROCEDURE — 93005 ELECTROCARDIOGRAM TRACING: CPT | Performed by: FAMILY MEDICINE

## 2017-12-28 PROCEDURE — 96366 THER/PROPH/DIAG IV INF ADDON: CPT

## 2017-12-28 PROCEDURE — 85610 PROTHROMBIN TIME: CPT

## 2017-12-28 PROCEDURE — 83880 ASSAY OF NATRIURETIC PEPTIDE: CPT

## 2017-12-28 PROCEDURE — 700111 HCHG RX REV CODE 636 W/ 250 OVERRIDE (IP): Performed by: FAMILY MEDICINE

## 2017-12-28 RX ORDER — LORAZEPAM 2 MG/ML
1 INJECTION INTRAMUSCULAR
Status: DISCONTINUED | OUTPATIENT
Start: 2017-12-28 | End: 2017-12-30 | Stop reason: HOSPADM

## 2017-12-28 RX ORDER — BISACODYL 10 MG
10 SUPPOSITORY, RECTAL RECTAL
Status: DISCONTINUED | OUTPATIENT
Start: 2017-12-28 | End: 2017-12-30 | Stop reason: HOSPADM

## 2017-12-28 RX ORDER — LORAZEPAM 1 MG/1
2 TABLET ORAL
Status: DISCONTINUED | OUTPATIENT
Start: 2017-12-28 | End: 2017-12-30 | Stop reason: HOSPADM

## 2017-12-28 RX ORDER — ENALAPRILAT 1.25 MG/ML
1.25 INJECTION INTRAVENOUS EVERY 6 HOURS PRN
Status: DISCONTINUED | OUTPATIENT
Start: 2017-12-28 | End: 2017-12-30 | Stop reason: HOSPADM

## 2017-12-28 RX ORDER — SODIUM CHLORIDE, SODIUM LACTATE, POTASSIUM CHLORIDE, CALCIUM CHLORIDE 600; 310; 30; 20 MG/100ML; MG/100ML; MG/100ML; MG/100ML
1000 INJECTION, SOLUTION INTRAVENOUS ONCE
Status: COMPLETED | OUTPATIENT
Start: 2017-12-28 | End: 2017-12-28

## 2017-12-28 RX ORDER — AZITHROMYCIN 250 MG/1
500 TABLET, FILM COATED ORAL DAILY
Status: DISCONTINUED | OUTPATIENT
Start: 2017-12-29 | End: 2017-12-30 | Stop reason: HOSPADM

## 2017-12-28 RX ORDER — LORAZEPAM 2 MG/ML
2 INJECTION INTRAMUSCULAR
Status: DISCONTINUED | OUTPATIENT
Start: 2017-12-28 | End: 2017-12-30 | Stop reason: HOSPADM

## 2017-12-28 RX ORDER — CEFTRIAXONE 2 G/1
2 INJECTION, POWDER, FOR SOLUTION INTRAMUSCULAR; INTRAVENOUS ONCE
Status: DISCONTINUED | OUTPATIENT
Start: 2017-12-28 | End: 2017-12-28

## 2017-12-28 RX ORDER — IPRATROPIUM BROMIDE AND ALBUTEROL SULFATE 2.5; .5 MG/3ML; MG/3ML
3 SOLUTION RESPIRATORY (INHALATION) ONCE
Status: COMPLETED | OUTPATIENT
Start: 2017-12-28 | End: 2017-12-28

## 2017-12-28 RX ORDER — NICOTINE 21 MG/24HR
21 PATCH, TRANSDERMAL 24 HOURS TRANSDERMAL
Status: DISCONTINUED | OUTPATIENT
Start: 2017-12-28 | End: 2017-12-30 | Stop reason: HOSPADM

## 2017-12-28 RX ORDER — ONDANSETRON 2 MG/ML
4 INJECTION INTRAMUSCULAR; INTRAVENOUS EVERY 4 HOURS PRN
Status: DISCONTINUED | OUTPATIENT
Start: 2017-12-28 | End: 2017-12-30 | Stop reason: HOSPADM

## 2017-12-28 RX ORDER — ACETAMINOPHEN 325 MG/1
650 TABLET ORAL ONCE
Status: COMPLETED | OUTPATIENT
Start: 2017-12-28 | End: 2017-12-28

## 2017-12-28 RX ORDER — LORAZEPAM 1 MG/1
4 TABLET ORAL
Status: DISCONTINUED | OUTPATIENT
Start: 2017-12-28 | End: 2017-12-30 | Stop reason: HOSPADM

## 2017-12-28 RX ORDER — DEXTROSE MONOHYDRATE 25 G/50ML
25 INJECTION, SOLUTION INTRAVENOUS
Status: DISCONTINUED | OUTPATIENT
Start: 2017-12-28 | End: 2017-12-29

## 2017-12-28 RX ORDER — FOLIC ACID 1 MG/1
1 TABLET ORAL DAILY
Status: DISCONTINUED | OUTPATIENT
Start: 2017-12-29 | End: 2017-12-30 | Stop reason: HOSPADM

## 2017-12-28 RX ORDER — LORAZEPAM 2 MG/ML
0.5 INJECTION INTRAMUSCULAR EVERY 4 HOURS PRN
Status: DISCONTINUED | OUTPATIENT
Start: 2017-12-28 | End: 2017-12-30 | Stop reason: HOSPADM

## 2017-12-28 RX ORDER — ACETAMINOPHEN 325 MG/1
650 TABLET ORAL EVERY 6 HOURS PRN
Status: DISCONTINUED | OUTPATIENT
Start: 2017-12-28 | End: 2017-12-30 | Stop reason: HOSPADM

## 2017-12-28 RX ORDER — HEPARIN SODIUM 5000 [USP'U]/ML
5000 INJECTION, SOLUTION INTRAVENOUS; SUBCUTANEOUS EVERY 8 HOURS
Status: DISCONTINUED | OUTPATIENT
Start: 2017-12-28 | End: 2017-12-29

## 2017-12-28 RX ORDER — AMOXICILLIN 250 MG
2 CAPSULE ORAL 2 TIMES DAILY
Status: DISCONTINUED | OUTPATIENT
Start: 2017-12-28 | End: 2017-12-30 | Stop reason: HOSPADM

## 2017-12-28 RX ORDER — LORAZEPAM 1 MG/1
1 TABLET ORAL EVERY 4 HOURS PRN
Status: DISCONTINUED | OUTPATIENT
Start: 2017-12-28 | End: 2017-12-30 | Stop reason: HOSPADM

## 2017-12-28 RX ORDER — LORAZEPAM 2 MG/ML
1.5 INJECTION INTRAMUSCULAR
Status: DISCONTINUED | OUTPATIENT
Start: 2017-12-28 | End: 2017-12-30 | Stop reason: HOSPADM

## 2017-12-28 RX ORDER — SODIUM CHLORIDE AND POTASSIUM CHLORIDE 150; 900 MG/100ML; MG/100ML
INJECTION, SOLUTION INTRAVENOUS CONTINUOUS
Status: ACTIVE | OUTPATIENT
Start: 2017-12-28 | End: 2017-12-28

## 2017-12-28 RX ORDER — LORAZEPAM 1 MG/1
0.5 TABLET ORAL EVERY 4 HOURS PRN
Status: DISCONTINUED | OUTPATIENT
Start: 2017-12-28 | End: 2017-12-30 | Stop reason: HOSPADM

## 2017-12-28 RX ORDER — OSELTAMIVIR PHOSPHATE 75 MG/1
75 CAPSULE ORAL EVERY 12 HOURS
Status: DISCONTINUED | OUTPATIENT
Start: 2017-12-28 | End: 2017-12-30 | Stop reason: HOSPADM

## 2017-12-28 RX ORDER — LORAZEPAM 1 MG/1
3 TABLET ORAL
Status: DISCONTINUED | OUTPATIENT
Start: 2017-12-28 | End: 2017-12-30 | Stop reason: HOSPADM

## 2017-12-28 RX ORDER — THIAMINE MONONITRATE (VIT B1) 100 MG
100 TABLET ORAL DAILY
Status: DISCONTINUED | OUTPATIENT
Start: 2017-12-29 | End: 2017-12-30 | Stop reason: HOSPADM

## 2017-12-28 RX ORDER — POTASSIUM CHLORIDE 20 MEQ/1
40 TABLET, EXTENDED RELEASE ORAL ONCE
Status: COMPLETED | OUTPATIENT
Start: 2017-12-28 | End: 2017-12-28

## 2017-12-28 RX ORDER — POLYETHYLENE GLYCOL 3350 17 G/17G
1 POWDER, FOR SOLUTION ORAL
Status: DISCONTINUED | OUTPATIENT
Start: 2017-12-28 | End: 2017-12-30 | Stop reason: HOSPADM

## 2017-12-28 RX ADMIN — ACETAMINOPHEN 650 MG: 325 TABLET, FILM COATED ORAL at 01:56

## 2017-12-28 RX ADMIN — ACETAMINOPHEN 650 MG: 325 TABLET, FILM COATED ORAL at 21:08

## 2017-12-28 RX ADMIN — CEFTRIAXONE 2 G: 2 INJECTION, POWDER, FOR SOLUTION INTRAMUSCULAR; INTRAVENOUS at 04:23

## 2017-12-28 RX ADMIN — SODIUM CHLORIDE, POTASSIUM CHLORIDE, SODIUM LACTATE AND CALCIUM CHLORIDE 1000 ML: 600; 310; 30; 20 INJECTION, SOLUTION INTRAVENOUS at 01:57

## 2017-12-28 RX ADMIN — PNEUMOCOCCAL VACCINE POLYVALENT 25 MCG
25; 25; 25; 25; 25; 25; 25; 25; 25; 25; 25; 25; 25; 25; 25; 25; 25; 25; 25; 25; 25; 25; 25 INJECTION, SOLUTION INTRAMUSCULAR; SUBCUTANEOUS at 17:04

## 2017-12-28 RX ADMIN — POTASSIUM CHLORIDE: 2 INJECTION, SOLUTION, CONCENTRATE INTRAVENOUS at 08:21

## 2017-12-28 RX ADMIN — OSELTAMIVIR PHOSPHATE 75 MG: 75 CAPSULE ORAL at 21:08

## 2017-12-28 RX ADMIN — HEPARIN SODIUM 5000 UNITS: 5000 INJECTION INTRAVENOUS; SUBCUTANEOUS at 12:59

## 2017-12-28 RX ADMIN — AZITHROMYCIN FOR INJECTION INJECTION, POWDER, LYOPHILIZED, FOR SOLUTION 500 MG: 500 INJECTION INTRAVENOUS at 06:28

## 2017-12-28 RX ADMIN — IPRATROPIUM BROMIDE AND ALBUTEROL SULFATE 3 ML: .5; 3 SOLUTION RESPIRATORY (INHALATION) at 02:36

## 2017-12-28 RX ADMIN — POTASSIUM CHLORIDE 40 MEQ: 1500 TABLET, EXTENDED RELEASE ORAL at 18:37

## 2017-12-28 RX ADMIN — OSELTAMIVIR PHOSPHATE 75 MG: 75 CAPSULE ORAL at 05:00

## 2017-12-28 ASSESSMENT — LIFESTYLE VARIABLES
VISUAL DISTURBANCES: NOT PRESENT
NAUSEA AND VOMITING: NO NAUSEA AND NO VOMITING
NAUSEA AND VOMITING: NO NAUSEA AND NO VOMITING
HEADACHE, FULLNESS IN HEAD: NOT PRESENT
ANXIETY: NO ANXIETY (AT EASE)
ANXIETY: NO ANXIETY (AT EASE)
VISUAL DISTURBANCES: NOT PRESENT
ANXIETY: NO ANXIETY (AT EASE)
TOTAL SCORE: 0
TREMOR: TREMOR NOT VISIBLE BUT CAN BE FELT, FINGERTIP TO FINGERTIP
HAVE PEOPLE ANNOYED YOU BY CRITICIZING YOUR DRINKING: NO
ORIENTATION AND CLOUDING OF SENSORIUM: ORIENTED AND CAN DO SERIAL ADDITIONS
TOTAL SCORE: 0
HEADACHE, FULLNESS IN HEAD: NOT PRESENT
AUDITORY DISTURBANCES: NOT PRESENT
TOTAL SCORE: 0
TOTAL SCORE: 0
AGITATION: NORMAL ACTIVITY
EVER_SMOKED: YES
EVER_SMOKED: YES
PAROXYSMAL SWEATS: NO SWEAT VISIBLE
TREMOR: TREMOR NOT VISIBLE BUT CAN BE FELT, FINGERTIP TO FINGERTIP
PAROXYSMAL SWEATS: NO SWEAT VISIBLE
EVER HAD A DRINK FIRST THING IN THE MORNING TO STEADY YOUR NERVES TO GET RID OF A HANGOVER: NO
VISUAL DISTURBANCES: NOT PRESENT
TREMOR: NO TREMOR
ORIENTATION AND CLOUDING OF SENSORIUM: ORIENTED AND CAN DO SERIAL ADDITIONS
AGITATION: SOMEWHAT MORE THAN NORMAL ACTIVITY
ALCOHOL_USE: YES
TOTAL SCORE: 2
CONSUMPTION TOTAL: INCOMPLETE
NAUSEA AND VOMITING: NO NAUSEA AND NO VOMITING
AUDITORY DISTURBANCES: NOT PRESENT
AGITATION: NORMAL ACTIVITY
HAVE YOU EVER FELT YOU SHOULD CUT DOWN ON YOUR DRINKING: NO
ORIENTATION AND CLOUDING OF SENSORIUM: ORIENTED AND CAN DO SERIAL ADDITIONS
AUDITORY DISTURBANCES: NOT PRESENT
EVER FELT BAD OR GUILTY ABOUT YOUR DRINKING: NO
PAROXYSMAL SWEATS: NO SWEAT VISIBLE
TOTAL SCORE: 1
HEADACHE, FULLNESS IN HEAD: NOT PRESENT

## 2017-12-28 ASSESSMENT — ENCOUNTER SYMPTOMS
HEADACHES: 0
NAUSEA: 0
BRUISES/BLEEDS EASILY: 0
MYALGIAS: 0
DIZZINESS: 0
COUGH: 1
DEPRESSION: 0
DOUBLE VISION: 0
PALPITATIONS: 0
CHILLS: 1
BLURRED VISION: 0
SHORTNESS OF BREATH: 1
VOMITING: 0

## 2017-12-28 ASSESSMENT — PAIN SCALES - GENERAL
PAINLEVEL_OUTOF10: 3
PAINLEVEL_OUTOF10: 3
PAINLEVEL_OUTOF10: 8
PAINLEVEL_OUTOF10: 0

## 2017-12-28 ASSESSMENT — COGNITIVE AND FUNCTIONAL STATUS - GENERAL
DAILY ACTIVITIY SCORE: 24
MOBILITY SCORE: 23
CLIMB 3 TO 5 STEPS WITH RAILING: A LITTLE
SUGGESTED CMS G CODE MODIFIER DAILY ACTIVITY: CH
SUGGESTED CMS G CODE MODIFIER MOBILITY: CI

## 2017-12-28 ASSESSMENT — COPD QUESTIONNAIRES
HAVE YOU SMOKED AT LEAST 100 CIGARETTES IN YOUR ENTIRE LIFE: YES
DO YOU EVER COUGH UP ANY MUCUS OR PHLEGM?: YES, A FEW DAYS A WEEK OR MONTH
COPD SCREENING SCORE: 6
DURING THE PAST 4 WEEKS HOW MUCH DID YOU FEEL SHORT OF BREATH: SOME OF THE TIME

## 2017-12-28 ASSESSMENT — PATIENT HEALTH QUESTIONNAIRE - PHQ9
1. LITTLE INTEREST OR PLEASURE IN DOING THINGS: NOT AT ALL
SUM OF ALL RESPONSES TO PHQ9 QUESTIONS 1 AND 2: 0
SUM OF ALL RESPONSES TO PHQ QUESTIONS 1-9: 0
2. FEELING DOWN, DEPRESSED, IRRITABLE, OR HOPELESS: NOT AT ALL

## 2017-12-28 NOTE — ED NOTES
tech from Lab called with critical result of Hgb at 5.5. Critical lab result read back to tech.   Dr. Olivas notified of critical lab result at 2120.  Critical lab result read back by Dr. Olivas, new orders received.

## 2017-12-28 NOTE — H&P
Hospital Medicine History and Physical    Date of Service  12/28/2017    Chief Complaint  Chief Complaint   Patient presents with   • Shortness of Breath   • Body Aches       History of Presenting Illness  52 y.o. Male With past medical history of alcohol abuse who came in with shortness of breath and body aches. Patient admits to sick contacts. He has been having sore throats, muscle aches, and weakness for couple of days. He was also having occasional chills but no fever. Was found to have influenza A positive. His hemoglobin in the ER was 5.5. 2 units of PRBCs were ordered. Stool guaiac test was negative. Chest x-ray showed multifocal pneumonia. Denies any nausea or vomiting. Denies any abdominal pain or diarrhea. Denies any hematemesis or ground coffee emesis. Denies any hematochezia or melena. Denies any chest pain. Denies any dizziness or lightheadedness.   Primary Care Physician  Pcp Pt States None    Consultants  None    Code Status  Full    Review of Systems  Review of Systems   Constitutional: Positive for chills and malaise/fatigue.   HENT: Negative for hearing loss and tinnitus.    Eyes: Negative for blurred vision and double vision.   Respiratory: Positive for cough and shortness of breath.    Cardiovascular: Negative for chest pain and palpitations.   Gastrointestinal: Negative for nausea and vomiting.   Genitourinary: Negative for dysuria and urgency.   Musculoskeletal: Negative for myalgias.   Skin: Negative for rash.   Neurological: Negative for dizziness and headaches.   Endo/Heme/Allergies: Does not bruise/bleed easily.   Psychiatric/Behavioral: Negative for depression and suicidal ideas.        Past Medical History  Past Medical History:   Diagnosis Date   • Renal failure 2010    Resolved, due to dehydration   • Dental disorder     poor dentations  broken teeth   • Pain        Surgical History  Past Surgical History:   Procedure Laterality Date   • INGUINAL HERNIA REPAIR  3/14/2014    Performed  by David Ramirez M.D. at SURGERY SAME DAY [facility]   • RADIUS ULNA ORIF  10/23/2011    Performed by GERMAN REILLY at SURGERY [facility]   • OTHER      broken jaw       Medications  No current facility-administered medications on file prior to encounter.      Current Outpatient Prescriptions on File Prior to Encounter   Medication Sig Dispense Refill   • hydrocortisone 1 % Cream Apply 1 Each to affected area(s) 2 times a day. 1 Tube 0   • oxycodone-acetaminophen (PERCOCET) 7.5-325 MG per tablet Take 1 Tab by mouth every four hours as needed.     • Ibuprofen-Diphenhydramine Cit (ADVIL PM PO) Take  by mouth every bedtime.     • naproxen (ALEVE) 220 MG tablet Take 220 mg by mouth 2 times a day, with meals.         Family History  Reviewed. Noncontributory.    Social History  Social History   Substance Use Topics   • Smoking status: Current Every Day Smoker     Packs/day: 0.50     Years: 20.00     Types: Cigarettes   • Smokeless tobacco: Never Used   • Alcohol use Yes      Comment: pint a day       Allergies  No Known Allergies     Physical Exam  Laboratory   Hemodynamics  Temp (24hrs), Av.2 °C (100.7 °F), Min:37.6 °C (99.7 °F), Max:39.2 °C (102.5 °F)   Temperature: (!) 38.1 °C (100.6 °F)  Pulse  Av.7  Min: 93  Max: 120 Heart Rate (Monitored): 90  Blood Pressure: 126/57, NIBP: 121/51      Respiratory      Respiration: 19, Pulse Oximetry: 92 %, O2 Daily Delivery Respiratory : Room Air with O2 Available     Given By:: Mouthpiece  RUL Breath Sounds: Diminished, RML Breath Sounds: Diminished, RLL Breath Sounds: Diminished, LADARIUS Breath Sounds: Diminished, LLL Breath Sounds: Diminished    Physical Exam   Constitutional: He is oriented to person, place, and time. He appears well-developed. No distress.   HENT:   Head: Normocephalic and atraumatic.   Eyes: Pupils are equal, round, and reactive to light. No scleral icterus.   Neck: Normal range of motion. No tracheal deviation present. No thyromegaly  present.   Cardiovascular: Normal rate, regular rhythm and normal heart sounds.    No murmur heard.  Pulmonary/Chest: Effort normal. No respiratory distress. He has rales.   Abdominal: Bowel sounds are normal. He exhibits no distension. There is no tenderness.   Musculoskeletal: He exhibits no edema or deformity.   Neurological: He is alert and oriented to person, place, and time.   Skin: Skin is dry. No erythema.   Psychiatric: He has a normal mood and affect. His behavior is normal.       Recent Labs      12/28/17 0147   WBC  7.1   RBC  3.08*   HEMOGLOBIN  5.5*   HEMATOCRIT  19.5*   MCV  63.3*   MCH  17.9*   MCHC  28.2*   RDW  47.1   PLATELETCT  290   MPV  9.2     Recent Labs      12/28/17 0147   SODIUM  125*   POTASSIUM  3.1*   CHLORIDE  91*   CO2  20   GLUCOSE  92   BUN  15   CREATININE  0.72   CALCIUM  8.3*     Recent Labs      12/28/17 0147   ALTSGPT  18   ASTSGOT  49*   ALKPHOSPHAT  41   TBILIRUBIN  0.6   GLUCOSE  92     Recent Labs      12/28/17 0147   APTT  36.8*   INR  1.07     Recent Labs      12/28/17   0147   BNPBTYPENAT  63         Lab Results   Component Value Date    TROPONINI 0.01 12/28/2017     Urinalysis:    Lab Results  Component Value Date/Time   SPECGRAVITY 1.019 12/28/2017 0134   GLUCOSEUR Negative 12/28/2017 0134   KETONES 15 (A) 12/28/2017 0134   NITRITE Negative 12/28/2017 0134   WBCURINE 0-2 (A) 06/28/2010 1200   RBCURINE Rare 06/28/2010 1200   BACTERIA Moderate (A) 06/28/2010 1200   EPITHELCELL Few 06/28/2010 1200        Imaging  Chest x-ray was reviewed    Assessment/Plan     I anticipate this patient Will require at least to midnight stay for appropriate medical management.    * Anemia requiring transfusions   Assessment & Plan    The patient was found to have hemoglobin of 5.5. Stool guaiac Was negative. Patient denies any hematemesis or Coffee emesis. He denies any hematochezia or melena. 2 PRBCs units will be transfused. Monitor H&H. PPI and Carafate.        Hyponatremia-  (present on admission)   Assessment & Plan    Likely chronic secondary to alcohol abuse. Monitor levels. On IV fluids of normal saline now.        Influenza A- (present on admission)   Assessment & Plan    Tamiflu 5 days course. Droplet isolation precautions.        Pneumonia   Assessment & Plan    Community acquired pneumonia. Influenza screen was positive for influenza A. Components of bacterial and viral possible. On IV antibiotics and Tamiflu. Sputum was sent for culture.        Hypokalemia- (present on admission)   Assessment & Plan    On replacement.        Alcohol abuse- (present on admission)   Assessment & Plan    Placed on [facility] protocol for alcohol withdrawal. Check alcohol levels. Banana bag. Folic acid and thiamine.            VTE prophylaxis: Heparin

## 2017-12-28 NOTE — ASSESSMENT & PLAN NOTE
Community acquired pneumonia. Influenza screen was positive for influenza A. Components of bacterial and viral possible. On IV antibiotics and Tamiflu. Sputum was sent for culture.  Follow-up pro calcitonin- slightly elevated at 1.12  Transition to oral antibiotics in a.m.

## 2017-12-28 NOTE — ASSESSMENT & PLAN NOTE
Placed on [facility] protocol for alcohol withdrawal.  Banana bag. Folic acid and thiamine.  With minimal lft elevation  - Follow up panel

## 2017-12-28 NOTE — PROGRESS NOTES
"2 RN skin check with Helga MCKEON. Pt bilateral ears red and blanching, bilateral elbows red and blanching. Sacrum/coccyx area intact, no redness. Pt bilateral feet are dry and calloused; left lateral ankle with scab, per pt from \"boot that rubs against it.\" No pressure wounds noted.   "

## 2017-12-28 NOTE — ED NOTES
Pt ambulates to restroom with transfusion running, steady gait, no distress, pt able to teach back care and safety of transfusion. Call light within reach in restroom.

## 2017-12-28 NOTE — ASSESSMENT & PLAN NOTE
The patient was found to have hemoglobin of 5.5. Stool guaiac Was negative. Patient denies any hematemesis or Coffee emesis. He denies any hematochezia or melena. 2 PRBCs units will be transfused. Monitor H&H. PPI and Carafate.    12/29: Status post transfusion  Hemoglobin of 5.528  Appears stable  Continue to monitor H&H every 6  Transfuse if hemoglobin less than 7  INR 1.07  Denies any prior history of GI bleed, rectal bleed, hematemesis  Does report episodes of bleeding from his lip due to a wound    Patient does report night sweats for the last 2-3 months, family history of malignancy including multiple family members with lung cancer and a history of smoking  Patient does have risk factors for lung cancer   we'll follow-up chest x-ray        Follow-up occult blood testing  We'll consult GI as needed

## 2017-12-28 NOTE — ED NOTES
Blood transfusion completed per orders. Patient tolerated well - no S/S transfusion reaction noted. Abx infusing. Pt medicated per MAR. Aware of POC, awaiting bed assignment.

## 2017-12-28 NOTE — ED NOTES
Called Tele to confirm they are to  patient, spoke with Helga MCKEON who is the flex nurse and will come down in 20 mins for transport.

## 2017-12-28 NOTE — ED NOTES
Report rec. From primary nurse.  Pt evaluated to be sleeping, rec. Antibiotic infusion, pending room assignment.

## 2017-12-28 NOTE — ED NOTES
"Chief Complaint   Patient presents with   • Shortness of Breath   • Body Aches     BP (!) 161/77   Pulse (!) 114   Temp (!) 39.2 °C (102.5 °F)   Resp 16   Ht 1.778 m (5' 10\")   Wt 57.1 kg (125 lb 14.1 oz)   SpO2 97%   BMI 18.06 kg/m²     Pt ambulated into triage, brought in by DANILOSA, complaints as above. VS as above. Pt reports cough, SOB, body aches x5 weeks. Pt reports intermittent symptoms. Mask applied. Sepsis score of 3, protocol initiated. Charge notified.  "

## 2017-12-28 NOTE — ED PROVIDER NOTES
"ED Provider Note    CHIEF COMPLAINT  Chief Complaint   Patient presents with   • Shortness of Breath   • Body Aches        HPI    Primary care provider: Pcp Pt States None   History obtained from:Patient  History limited by: None     Jhon Aguirre is a 52 y.o. male who presents to the ED via EMS for diffuse body aches, cough, shortness of breath, fever and chills, nausea/vomiting/diarrhea, abdominal pain and back pain for about a month. Patient reports a hacking cough. He states that he is not sleeping well. He reports having vomiting all day long yesterday and about 10-12 episodes today without any blood. Reports on and off diarrhea all week without any blood. He denies dysuria. There is no rash or swelling. He states that the shortness of breath is worse with exertion. He denies any recent travels. He is homeless and staying at a shelter and states that everybody there is sick. He denies any known history of lung problems. He reports history of \"accelerated heart rate\" but denies any other cardiac problems.      REVIEW OF SYSTEMS  Please see HPI for pertinent positives/negatives.  All other systems reviewed and are negative.     PAST MEDICAL HISTORY  Past Medical History:   Diagnosis Date   • Renal failure 2010    Resolved, due to dehydration   • Dental disorder     poor dentations  broken teeth   • Pain         SURGICAL HISTORY  Past Surgical History:   Procedure Laterality Date   • INGUINAL HERNIA REPAIR  3/14/2014    Performed by David Ramirez M.D. at SURGERY SAME DAY [facility] ORS   • RADIUS ULNA ORIF  10/23/2011    Performed by GERMAN REILLY at SURGERY [facility]S   • OTHER      broken jaw        SOCIAL HISTORY  Social History     Social History Main Topics   • Smoking status: Current Every Day Smoker     Packs/day: 0.50     Years: 20.00     Types: Cigarettes   • Smokeless tobacco: Never Used   • Alcohol use Yes      Comment: pint a day   • Drug use:       Comment: weed   • Sexual " "activity: Not on file        FAMILY HISTORY  History reviewed. No pertinent family history.     CURRENT MEDICATIONS  Home Medications     Reviewed by Rose Galvan, Pharmacy Intern (Pharmacy Intern) on 12/28/17 at 0751  Med List Status: Complete   Medication Last Dose Status        Patient Anish Taking any Medications                        ALLERGIES  No Known Allergies     PHYSICAL EXAM  VITAL SIGNS: /58   Pulse 78   Temp 37.1 °C (98.8 °F)   Resp 16   Ht 1.778 m (5' 10\")   Wt 57.1 kg (125 lb 14.1 oz)   SpO2 98%   BMI 18.06 kg/m²  @PAULETTE[[id number]::@     Pulse ox interpretation:97% I interpret this pulse ox as normal     Constitutional: Well developed, well nourished, alert in no apparent distress, nontoxic appearance    HENT: No external signs of trauma, normocephalic, bilateral external ears normal, oropharynx moist and clear, nose normal    Eyes: PERRL, conjunctiva without erythema, no discharge, no icterus    Neck: Soft and supple, trachea midline, no stridor, no tenderness, no LAD, no JVD, good ROM    Cardiovascular: Tachycardia, no murmurs/rubs/gallops, strong distal pulses and good perfusion    Thorax & Lungs: No respiratory distress, scattered coarse breath sounds bilaterally, nonproductive cough noted  Abdomen: Soft, no apparent tenderness to palpation, nondistended, no guarding, no rebound, normal BS    Back: No CVAT    Extremities: No clubbing, no cyanosis, no edema, no gross deformity, good ROM, no tenderness, intact distal pulses with brisk cap refill    Skin: Warm, dry, no pallor/cyanosis, no rash noted    Lymphatic: No lymphadenopathy noted    Neuro: A/O times 3, no focal deficits noted    Psychiatric: Cooperative        DIAGNOSTIC STUDIES / PROCEDURES    EKG  12 Lead EKG obtained at 0223 and interpreted by me:   Rate: 100   Rhythm: Sinus rhythm   Ectopy: None  Intervals: Normal   Axis: Normal   Q Waves: None   QRS: Normal   ST segments: Normal  T Waves: Normal    Clinical Impression: Sinus " rhythm without acute ST-T wave changes  Compared to July 11, 2012 without significant change      LABS  All labs reviewed by me.     Results for orders placed or performed during the hospital encounter of 12/28/17   Lactic acid (lactate)   Result Value Ref Range    Lactic Acid 1.6 0.5 - 2.0 mmol/L   CBC WITH DIFFERENTIAL   Result Value Ref Range    WBC 7.1 4.8 - 10.8 K/uL    RBC 3.08 (L) 4.70 - 6.10 M/uL    Hemoglobin 5.5 (LL) 14.0 - 18.0 g/dL    Hematocrit 19.5 (L) 42.0 - 52.0 %    MCV 63.3 (L) 81.4 - 97.8 fL    MCH 17.9 (L) 27.0 - 33.0 pg    MCHC 28.2 (L) 33.7 - 35.3 g/dL    RDW 47.1 35.9 - 50.0 fL    Platelet Count 290 164 - 446 K/uL    MPV 9.2 9.0 - 12.9 fL    Nucleated RBC 0.30 /100 WBC    NRBC (Absolute) 0.02 K/uL    Neutrophils-Polys 79.80 (H) 44.00 - 72.00 %    Lymphocytes 10.50 (L) 22.00 - 41.00 %    Monocytes 9.70 0.00 - 13.40 %    Eosinophils 0.00 0.00 - 6.90 %    Basophils 0.00 0.00 - 1.80 %    Neutrophils (Absolute) 5.67 1.82 - 7.42 K/uL    Lymphs (Absolute) 0.75 (L) 1.00 - 4.80 K/uL    Monos (Absolute) 0.69 0.00 - 0.85 K/uL    Eos (Absolute) 0.00 0.00 - 0.51 K/uL    Baso (Absolute) 0.00 0.00 - 0.12 K/uL    Hypochromia 2+     Anisocytosis 2+     Microcytosis 2+    COMP METABOLIC PANEL   Result Value Ref Range    Sodium 125 (L) 135 - 145 mmol/L    Potassium 3.1 (L) 3.6 - 5.5 mmol/L    Chloride 91 (L) 96 - 112 mmol/L    Co2 20 20 - 33 mmol/L    Anion Gap 14.0 (H) 0.0 - 11.9    Glucose 92 65 - 99 mg/dL    Bun 15 8 - 22 mg/dL    Creatinine 0.72 0.50 - 1.40 mg/dL    Calcium 8.3 (L) 8.5 - 10.5 mg/dL    AST(SGOT) 49 (H) 12 - 45 U/L    ALT(SGPT) 18 2 - 50 U/L    Alkaline Phosphatase 41 30 - 99 U/L    Total Bilirubin 0.6 0.1 - 1.5 mg/dL    Albumin 3.3 3.2 - 4.9 g/dL    Total Protein 7.9 6.0 - 8.2 g/dL    Globulin 4.6 (H) 1.9 - 3.5 g/dL    A-G Ratio 0.7 g/dL   URINALYSIS   Result Value Ref Range    Color Yellow     Character Clear     Specific Gravity 1.019 <1.035    Ph 6.5 5.0 - 8.0    Glucose Negative Negative  mg/dL    Ketones 15 (A) Negative mg/dL    Protein Negative Negative mg/dL    Bilirubin Negative Negative    Urobilinogen, Urine 2.0 Negative    Nitrite Negative Negative    Leukocyte Esterase Negative Negative    Occult Blood Negative Negative    Micro Urine Req see below    INFLUENZA A/B BY PCR   Result Value Ref Range    Influenza virus A RNA Negative Negative    Influenza virus B, PCR POSITIVE (A) Negative   DIAGNOSTIC ALCOHOL   Result Value Ref Range    Diagnostic Alcohol 0.00 0.00 g/dL   TROPONIN   Result Value Ref Range    Troponin I 0.01 0.00 - 0.04 ng/mL   BTYPE NATRIURETIC PEPTIDE   Result Value Ref Range    B Natriuretic Peptide 63 0 - 100 pg/mL   DIFFERENTIAL MANUAL   Result Value Ref Range    Manual Diff Status PERFORMED    PERIPHERAL SMEAR REVIEW   Result Value Ref Range    Peripheral Smear Review see below    PLATELET ESTIMATE   Result Value Ref Range    Plt Estimation #CAC    MORPHOLOGY   Result Value Ref Range    RBC Morphology Present     Poikilocytosis 1+    ESTIMATED GFR   Result Value Ref Range    GFR If African American >60 >60 mL/min/1.73 m 2    GFR If Non African American >60 >60 mL/min/1.73 m 2   PROTHROMBIN TIME (INR)   Result Value Ref Range    PT 13.6 12.0 - 14.6 sec    INR 1.07 0.87 - 1.13   APTT   Result Value Ref Range    APTT 36.8 (H) 24.7 - 36.0 sec   MAGNESIUM   Result Value Ref Range    Magnesium 1.4 (L) 1.5 - 2.5 mg/dL   Procalcitonin   Result Value Ref Range    Procalcitonin 1.12 (H) <0.25 ng/mL   COD (ADULT)   Result Value Ref Range    ABO Grouping Only O     Rh Grouping Only POS     Antibody Screen-Cod NEG     Component R       RC                  Red Blood Cells     [id number]   issued       12/28/17   04:13      Product Type Red Blood Cells LR     Dispense Status Issued     Unit Number (Barcoded) [id number]     Product Code (Barcoded) R5958B67     Blood Type (Barcoded) 9500     Component R       R                   Red Blood Cells     [id number]   issued        17   07:52      Product Type Red Blood Cells LR     Dispense Status Issued     Unit Number (Barcoded) [id number]     Product Code (Barcoded) Q1381A97     Blood Type (Barcoded) 5100    ABO CONFIRMATION   Result Value Ref Range    Second ABO Typing With Cod O    TSH (Thyroid Stimulating Hormone)   Result Value Ref Range    TSH 1.090 0.380 - 5.330 uIU/mL   Phosphorus: STAT   Result Value Ref Range    Phosphorus 2.6 2.5 - 4.5 mg/dL   Urine Drug Screen   Result Value Ref Range    Amphetamines Urine Negative Negative    Barbiturates Negative Negative    Benzodiazepines Negative Negative    Cocaine Metabolite Negative Negative    Methadone Negative Negative    Opiates Negative Negative    Oxycodone Negative Negative    Phencyclidine -Pcp Negative Negative    Propoxyphene Negative Negative    Cannabinoid Metab Positive (A) Negative   EKG (NOW)   Result Value Ref Range    Report       [facility] Emergency Dept.    Test Date:  2017  Pt Name:    [facility]             Department: ER  MRN:        [id number]                      Room:       [facility]  Gender:     Male                         Technician: [id number]  :        [id number]                   Requested By:SALIMA RUSSELL  Order #:    [id number]                    Reading MD: Salima Russell    Measurements  Intervals                                Axis  Rate:       100                          P:          76  VT:         140                          QRS:        76  QRSD:       88                           T:          73  QT:         356  QTc:        460    Interpretive Statements  SINUS TACHYCARDIA  Compared to ECG 2014 16:42:25  Sinus rhythm no longer present    Electronically Signed On 2017 8:56:02 PST by Salima Russell          RADIOLOGY  The radiologist's interpretation of all radiological studies have been reviewed by me.     DX-CHEST-PORTABLE (1 VIEW)   Final Result      Ill-defined peripheral opacities in the LEFT mid and upper lung  most concerning for multifocal pneumonia.  Follow-up recommended to ensure resolution as neoplasm is not excluded.             COURSE & MEDICAL DECISION MAKING  Nursing notes, VS, PMSFHx reviewed in chart.       Differential diagnoses considered include but are not limited to: URI, pneumonia, bronchitis, influenza, laryngitis, pharyngitis/tonsillitis, epiglottitis, peritonsillar abscess, retropharyngeal abscess, sinusitis, mononucleosis, viral syndrome, allergic rhinitis, otitis media    0225: D/W Dr. Schroeder, Hospitalist, who will admit patient.       Patient presents to the ED with above complaint. EKG without acute ischemic findings. Chest x-ray showed findings concerning for multifocal pneumonia. Labs showed severe anemia as well as hyponatremia, hypokalemia, and hypomagnesemia. Patient was also positive for influenza B. IV fluid was initiated due to his presenting tachycardia with concern for dehydration from his history of nausea and vomiting as well as concern for sepsis with his fever. Patient was given acetaminophen for his fever. He was also given a DuoNeb treatment. Patient was started on Rocephin and Zithromax after blood cultures. 2 units of packed RBC were ordered and transfused. Patient otherwise was hemodynamically stable during his ED stay. Findings discussed with patient and he is agreeable to admission. Discussed with Dr. Schroeder who graciously agreed to admit patient for further care.      CRITICAL CARE NOTE:  Total critical care time spent on this patient was 35 minutes exclusive of separately billable procedures.  This may include direct bedside patient care, speaking with family members, review of past medical records, reviewing the results of laboratory/diagnostic studies, consulting with other physicians, as well as evaluating the response to the therapy instituted.          FINAL IMPRESSION  1. Anemia, unspecified type    2. Multifocal pneumonia    3. Hyponatremia    4. Hypokalemia    5.  Fever, unspecified fever cause    6. Hypomagnesemia    7. Influenza B           DISPOSITION  Patient will be admitted to hospitalist for further care          Electronically signed by: Mike Olivas, 12/28/2017 1:45 AM      Portions of this record were made with voice recognition software.  Despite my review, spelling/grammar/context errors may still remain.  Interpretation of this chart should be taken in this context.

## 2017-12-28 NOTE — ED NOTES
Mata from Lab called with critical result of positive influenza B at 0305. Critical lab result read back to Mata.   Dr. Olivas notified of critical lab result at 0308.  Critical lab result read back by Dr. Olivas.

## 2017-12-29 LAB
ALBUMIN SERPL BCP-MCNC: 2.9 G/DL (ref 3.2–4.9)
ALBUMIN/GLOB SERPL: 0.6 G/DL
ALP SERPL-CCNC: 42 U/L (ref 30–99)
ALT SERPL-CCNC: 13 U/L (ref 2–50)
ANION GAP SERPL CALC-SCNC: 7 MMOL/L (ref 0–11.9)
ANISOCYTOSIS BLD QL SMEAR: ABNORMAL
AST SERPL-CCNC: 36 U/L (ref 12–45)
BASOPHILS # BLD AUTO: 0 % (ref 0–1.8)
BASOPHILS # BLD: 0 K/UL (ref 0–0.12)
BILIRUB SERPL-MCNC: 0.5 MG/DL (ref 0.1–1.5)
BUN SERPL-MCNC: 7 MG/DL (ref 8–22)
CALCIUM SERPL-MCNC: 8.4 MG/DL (ref 8.5–10.5)
CHLORIDE SERPL-SCNC: 103 MMOL/L (ref 96–112)
CO2 SERPL-SCNC: 23 MMOL/L (ref 20–33)
CREAT SERPL-MCNC: 0.45 MG/DL (ref 0.5–1.4)
EKG IMPRESSION: NORMAL
EOSINOPHIL # BLD AUTO: 0 K/UL (ref 0–0.51)
EOSINOPHIL NFR BLD: 0 % (ref 0–6.9)
ERYTHROCYTE [DISTWIDTH] IN BLOOD BY AUTOMATED COUNT: 66 FL (ref 35.9–50)
ERYTHROCYTE [DISTWIDTH] IN BLOOD BY AUTOMATED COUNT: 66.5 FL (ref 35.9–50)
GFR SERPL CREATININE-BSD FRML MDRD: >60 ML/MIN/1.73 M 2
GLOBULIN SER CALC-MCNC: 4.5 G/DL (ref 1.9–3.5)
GLUCOSE BLD-MCNC: 108 MG/DL (ref 65–99)
GLUCOSE SERPL-MCNC: 88 MG/DL (ref 65–99)
HAV IGM SERPL QL IA: NEGATIVE
HBV CORE IGM SER QL: NEGATIVE
HBV SURFACE AG SER QL: NEGATIVE
HCT VFR BLD AUTO: 29 % (ref 42–52)
HCT VFR BLD AUTO: 30.3 % (ref 42–52)
HCT VFR BLD AUTO: 30.6 % (ref 42–52)
HCV AB SER QL: NEGATIVE
HEMOCCULT SP1 STL QL: POSITIVE
HGB BLD-MCNC: 8.8 G/DL (ref 14–18)
HGB BLD-MCNC: 9.1 G/DL (ref 14–18)
HGB BLD-MCNC: 9.1 G/DL (ref 14–18)
HGB RETIC QN AUTO: 22 PG/CELL (ref 29–35)
HYPOCHROMIA BLD QL SMEAR: ABNORMAL
IMM RETICS NFR: 40 % (ref 9.3–17.4)
IRON SATN MFR SERPL: 4 % (ref 15–55)
IRON SERPL-MCNC: 13 UG/DL (ref 50–180)
LG PLATELETS BLD QL SMEAR: NORMAL
LYMPHOCYTES # BLD AUTO: 1.39 K/UL (ref 1–4.8)
LYMPHOCYTES NFR BLD: 24.8 % (ref 22–41)
MACROCYTES BLD QL SMEAR: ABNORMAL
MANUAL DIFF BLD: NORMAL
MCH RBC QN AUTO: 21.3 PG (ref 27–33)
MCH RBC QN AUTO: 21.8 PG (ref 27–33)
MCHC RBC AUTO-ENTMCNC: 29.7 G/DL (ref 33.7–35.3)
MCHC RBC AUTO-ENTMCNC: 30.3 G/DL (ref 33.7–35.3)
MCV RBC AUTO: 71.7 FL (ref 81.4–97.8)
MCV RBC AUTO: 72 FL (ref 81.4–97.8)
MICROCYTES BLD QL SMEAR: ABNORMAL
MONOCYTES # BLD AUTO: 0.3 K/UL (ref 0–0.85)
MONOCYTES NFR BLD AUTO: 5.3 % (ref 0–13.4)
MORPHOLOGY BLD-IMP: NORMAL
NEUTROPHILS # BLD AUTO: 3.91 K/UL (ref 1.82–7.42)
NEUTROPHILS NFR BLD: 69.9 % (ref 44–72)
NRBC # BLD AUTO: 0.02 K/UL
NRBC BLD-RTO: 0.4 /100 WBC
PLATELET # BLD AUTO: 287 K/UL (ref 164–446)
PLATELET # BLD AUTO: 290 K/UL (ref 164–446)
PLATELET BLD QL SMEAR: NORMAL
PMV BLD AUTO: 9.3 FL (ref 9–12.9)
PMV BLD AUTO: 9.7 FL (ref 9–12.9)
POTASSIUM SERPL-SCNC: 3.3 MMOL/L (ref 3.6–5.5)
PROT SERPL-MCNC: 7.4 G/DL (ref 6–8.2)
RBC # BLD AUTO: 4.03 M/UL (ref 4.7–6.1)
RBC # BLD AUTO: 4.27 M/UL (ref 4.7–6.1)
RBC BLD AUTO: PRESENT
RETICS # AUTO: 0.02 M/UL (ref 0.04–0.06)
RETICS/RBC NFR: 0.5 % (ref 0.8–2.1)
SODIUM SERPL-SCNC: 133 MMOL/L (ref 135–145)
TIBC SERPL-MCNC: 367 UG/DL (ref 250–450)
WBC # BLD AUTO: 5.3 K/UL (ref 4.8–10.8)
WBC # BLD AUTO: 5.6 K/UL (ref 4.8–10.8)

## 2017-12-29 PROCEDURE — 83540 ASSAY OF IRON: CPT

## 2017-12-29 PROCEDURE — 93010 ELECTROCARDIOGRAM REPORT: CPT | Performed by: INTERNAL MEDICINE

## 2017-12-29 PROCEDURE — 85007 BL SMEAR W/DIFF WBC COUNT: CPT

## 2017-12-29 PROCEDURE — 700102 HCHG RX REV CODE 250 W/ 637 OVERRIDE(OP): Performed by: INTERNAL MEDICINE

## 2017-12-29 PROCEDURE — 82270 OCCULT BLOOD FECES: CPT

## 2017-12-29 PROCEDURE — 82962 GLUCOSE BLOOD TEST: CPT

## 2017-12-29 PROCEDURE — 700102 HCHG RX REV CODE 250 W/ 637 OVERRIDE(OP): Performed by: FAMILY MEDICINE

## 2017-12-29 PROCEDURE — HZ2ZZZZ DETOXIFICATION SERVICES FOR SUBSTANCE ABUSE TREATMENT: ICD-10-PCS | Performed by: INTERNAL MEDICINE

## 2017-12-29 PROCEDURE — 85046 RETICYTE/HGB CONCENTRATE: CPT

## 2017-12-29 PROCEDURE — 36415 COLL VENOUS BLD VENIPUNCTURE: CPT

## 2017-12-29 PROCEDURE — 85027 COMPLETE CBC AUTOMATED: CPT

## 2017-12-29 PROCEDURE — A9270 NON-COVERED ITEM OR SERVICE: HCPCS | Performed by: INTERNAL MEDICINE

## 2017-12-29 PROCEDURE — 770020 HCHG ROOM/CARE - TELE (206)

## 2017-12-29 PROCEDURE — 85018 HEMOGLOBIN: CPT

## 2017-12-29 PROCEDURE — 80074 ACUTE HEPATITIS PANEL: CPT

## 2017-12-29 PROCEDURE — 93005 ELECTROCARDIOGRAM TRACING: CPT | Performed by: FAMILY MEDICINE

## 2017-12-29 PROCEDURE — 30233N1 TRANSFUSION OF NONAUTOLOGOUS RED BLOOD CELLS INTO PERIPHERAL VEIN, PERCUTANEOUS APPROACH: ICD-10-PCS | Performed by: INTERNAL MEDICINE

## 2017-12-29 PROCEDURE — 80053 COMPREHEN METABOLIC PANEL: CPT

## 2017-12-29 PROCEDURE — 700111 HCHG RX REV CODE 636 W/ 250 OVERRIDE (IP): Performed by: FAMILY MEDICINE

## 2017-12-29 PROCEDURE — A9270 NON-COVERED ITEM OR SERVICE: HCPCS | Performed by: FAMILY MEDICINE

## 2017-12-29 PROCEDURE — 83550 IRON BINDING TEST: CPT

## 2017-12-29 PROCEDURE — 99233 SBSQ HOSP IP/OBS HIGH 50: CPT | Performed by: INTERNAL MEDICINE

## 2017-12-29 PROCEDURE — 85014 HEMATOCRIT: CPT

## 2017-12-29 RX ORDER — PANTOPRAZOLE SODIUM 40 MG/10ML
40 INJECTION, POWDER, LYOPHILIZED, FOR SOLUTION INTRAVENOUS 2 TIMES DAILY
Status: DISCONTINUED | OUTPATIENT
Start: 2017-12-29 | End: 2017-12-29

## 2017-12-29 RX ORDER — OMEPRAZOLE 20 MG/1
20 CAPSULE, DELAYED RELEASE ORAL 2 TIMES DAILY
Status: DISCONTINUED | OUTPATIENT
Start: 2017-12-29 | End: 2017-12-30 | Stop reason: HOSPADM

## 2017-12-29 RX ADMIN — OMEPRAZOLE 20 MG: 20 CAPSULE, DELAYED RELEASE ORAL at 11:39

## 2017-12-29 RX ADMIN — OSELTAMIVIR PHOSPHATE 75 MG: 75 CAPSULE ORAL at 21:12

## 2017-12-29 RX ADMIN — OSELTAMIVIR PHOSPHATE 75 MG: 75 CAPSULE ORAL at 09:30

## 2017-12-29 RX ADMIN — Medication 100 MG: at 09:30

## 2017-12-29 RX ADMIN — FOLIC ACID 1 MG: 1 TABLET ORAL at 09:29

## 2017-12-29 RX ADMIN — THERA TABS 1 TABLET: TAB at 09:30

## 2017-12-29 RX ADMIN — OMEPRAZOLE 20 MG: 20 CAPSULE, DELAYED RELEASE ORAL at 21:12

## 2017-12-29 RX ADMIN — AZITHROMYCIN 500 MG: 250 TABLET, FILM COATED ORAL at 09:29

## 2017-12-29 RX ADMIN — CEFTRIAXONE 2 G: 2 INJECTION, POWDER, FOR SOLUTION INTRAMUSCULAR; INTRAVENOUS at 09:30

## 2017-12-29 ASSESSMENT — ENCOUNTER SYMPTOMS
VOMITING: 0
DIARRHEA: 0
NERVOUS/ANXIOUS: 0
FEVER: 0
MEMORY LOSS: 0
TREMORS: 0
MYALGIAS: 1
SPUTUM PRODUCTION: 1
HEADACHES: 0
FLANK PAIN: 0
ABDOMINAL PAIN: 0
SHORTNESS OF BREATH: 1
NAUSEA: 0
WHEEZING: 1
BACK PAIN: 0
DIAPHORESIS: 0
DOUBLE VISION: 0
DIZZINESS: 0
WEIGHT LOSS: 1
BLURRED VISION: 0
DEPRESSION: 0
COUGH: 1
CHILLS: 0
PALPITATIONS: 0
WEAKNESS: 1
FOCAL WEAKNESS: 0
BLOOD IN STOOL: 0

## 2017-12-29 ASSESSMENT — LIFESTYLE VARIABLES
NAUSEA AND VOMITING: NO NAUSEA AND NO VOMITING
PAROXYSMAL SWEATS: NO SWEAT VISIBLE
SUBSTANCE_ABUSE: 1
ANXIETY: NO ANXIETY (AT EASE)
ORIENTATION AND CLOUDING OF SENSORIUM: ORIENTED AND CAN DO SERIAL ADDITIONS
TREMOR: NO TREMOR
AUDITORY DISTURBANCES: NOT PRESENT
TREMOR: NO TREMOR
VISUAL DISTURBANCES: NOT PRESENT
VISUAL DISTURBANCES: NOT PRESENT
HEADACHE, FULLNESS IN HEAD: NOT PRESENT
ORIENTATION AND CLOUDING OF SENSORIUM: ORIENTED AND CAN DO SERIAL ADDITIONS
AGITATION: NORMAL ACTIVITY
HEADACHE, FULLNESS IN HEAD: NOT PRESENT
ORIENTATION AND CLOUDING OF SENSORIUM: ORIENTED AND CAN DO SERIAL ADDITIONS
NAUSEA AND VOMITING: NO NAUSEA AND NO VOMITING
AUDITORY DISTURBANCES: NOT PRESENT
TOTAL SCORE: 0
HEADACHE, FULLNESS IN HEAD: NOT PRESENT
NAUSEA AND VOMITING: NO NAUSEA AND NO VOMITING
ANXIETY: NO ANXIETY (AT EASE)
AGITATION: NORMAL ACTIVITY
ANXIETY: NO ANXIETY (AT EASE)
VISUAL DISTURBANCES: NOT PRESENT
AGITATION: NORMAL ACTIVITY
ORIENTATION AND CLOUDING OF SENSORIUM: ORIENTED AND CAN DO SERIAL ADDITIONS
TOTAL SCORE: 0
NAUSEA AND VOMITING: NO NAUSEA AND NO VOMITING
HEADACHE, FULLNESS IN HEAD: NOT PRESENT
AGITATION: NORMAL ACTIVITY
TREMOR: NO TREMOR
VISUAL DISTURBANCES: NOT PRESENT
TREMOR: NO TREMOR
PAROXYSMAL SWEATS: NO SWEAT VISIBLE
AUDITORY DISTURBANCES: NOT PRESENT
AUDITORY DISTURBANCES: NOT PRESENT
ANXIETY: NO ANXIETY (AT EASE)
TOTAL SCORE: 0
TOTAL SCORE: 0

## 2017-12-29 ASSESSMENT — PAIN SCALES - GENERAL
PAINLEVEL_OUTOF10: 0

## 2017-12-29 NOTE — CARE PLAN
Problem: Communication  Goal: The ability to communicate needs accurately and effectively will improve  Outcome: PROGRESSING AS EXPECTED  POC discussed with pt. at bedside. All questions and concerns have been addressed at this time. Verbal understanding recieved    Problem: Safety  Goal: Will remain free from falls  Outcome: PROGRESSING AS EXPECTED  Katie Pires Fall Risk Assessment:     Last Known Fall: No falls  Mobility: Dizziness/generalized weakness  Medications: No meds  Mental Status/LOC/Awareness: Awake, alert, and oriented to date, place, and person  Toileting Needs: No needs  Volume/Electrolyte Status: Low blood sugar/electrolyte imbalances  Communication/Sensory: Visual (Glasses)/hearing deficit  Behavior: Appropriate behavior  Katie Pires Fall Risk Total: 8  Fall Risk Level: LOW RISK    Universal Fall Precautions:  call light/belongings in reach, bed in low position and locked, siderails up x 2, adequate lighting, educate on level of risk, educate to call for assistance    Fall Risk Level Interventions:   TRIAL (TELE 8, NEURO, MED SUZI 5) Low Fall Risk Interventions  Place yellow fall risk ID band on patient: verified  Provide patient/family education based on risk assessment: completed  Educate patient/family to call staff for assistance when getting out of bed: completed  Place fall precaution signage outside patient door: verified      Patient Specific Interventions:     Medication: review medications with patient and family  Mental Status/LOC/Awareness: reinforce the use of call light  Toileting: provide frquent toileting  Volume/Electrolyte Status: monitor abnormal lab values and ensure IV fluids are appropriate  Communication/Sensory: update plan of care on whiteboard  Behavioral: engage patient in daily activities and administer medication as ordered  Mobility: ensure bed is locked and in lowest position, provide appropriate assistive device and instruct patient to exit bed on their strongest  side

## 2017-12-29 NOTE — PROGRESS NOTES
Assumed care of pt. at 1900    Bedside report received from Day Shift RN   POC discussed with pt. At bedside and Pt. verbalizes understanding.  Pt. Is Awake and AOx 4 , droplet precautions in place, running SR 93 on the monitor  Call light within reach  with appropriate instructions to call for assistance  Bed locked and in lowest position.

## 2017-12-30 ENCOUNTER — APPOINTMENT (OUTPATIENT)
Dept: RADIOLOGY | Facility: MEDICAL CENTER | Age: 52
DRG: 811 | End: 2017-12-30
Attending: INTERNAL MEDICINE
Payer: MEDICAID

## 2017-12-30 VITALS
HEIGHT: 70 IN | RESPIRATION RATE: 18 BRPM | TEMPERATURE: 97.8 F | DIASTOLIC BLOOD PRESSURE: 84 MMHG | OXYGEN SATURATION: 98 % | SYSTOLIC BLOOD PRESSURE: 129 MMHG | HEART RATE: 79 BPM | WEIGHT: 119.93 LBS | BODY MASS INDEX: 17.17 KG/M2

## 2017-12-30 PROBLEM — J10.1 INFLUENZA B: Status: ACTIVE | Noted: 2017-12-30

## 2017-12-30 LAB
ALBUMIN SERPL BCP-MCNC: 3 G/DL (ref 3.2–4.9)
ALBUMIN/GLOB SERPL: 0.6 G/DL
ALP SERPL-CCNC: 42 U/L (ref 30–99)
ALT SERPL-CCNC: 12 U/L (ref 2–50)
ANION GAP SERPL CALC-SCNC: 10 MMOL/L (ref 0–11.9)
ANISOCYTOSIS BLD QL SMEAR: ABNORMAL
AST SERPL-CCNC: 30 U/L (ref 12–45)
BACTERIA UR CULT: NORMAL
BASOPHILS # BLD AUTO: 0 % (ref 0–1.8)
BASOPHILS # BLD: 0 K/UL (ref 0–0.12)
BILIRUB SERPL-MCNC: 0.4 MG/DL (ref 0.1–1.5)
BUN SERPL-MCNC: 8 MG/DL (ref 8–22)
CALCIUM SERPL-MCNC: 8.7 MG/DL (ref 8.5–10.5)
CHLORIDE SERPL-SCNC: 101 MMOL/L (ref 96–112)
CO2 SERPL-SCNC: 22 MMOL/L (ref 20–33)
CREAT SERPL-MCNC: 0.45 MG/DL (ref 0.5–1.4)
EKG IMPRESSION: NORMAL
EOSINOPHIL # BLD AUTO: 0.1 K/UL (ref 0–0.51)
EOSINOPHIL NFR BLD: 1.8 % (ref 0–6.9)
ERYTHROCYTE [DISTWIDTH] IN BLOOD BY AUTOMATED COUNT: 68.5 FL (ref 35.9–50)
GFR SERPL CREATININE-BSD FRML MDRD: >60 ML/MIN/1.73 M 2
GLOBULIN SER CALC-MCNC: 4.8 G/DL (ref 1.9–3.5)
GLUCOSE SERPL-MCNC: 87 MG/DL (ref 65–99)
HCT VFR BLD AUTO: 31.9 % (ref 42–52)
HGB BLD-MCNC: 9.3 G/DL (ref 14–18)
LYMPHOCYTES # BLD AUTO: 1.46 K/UL (ref 1–4.8)
LYMPHOCYTES NFR BLD: 26.5 % (ref 22–41)
MANUAL DIFF BLD: NORMAL
MCH RBC QN AUTO: 20.9 PG (ref 27–33)
MCHC RBC AUTO-ENTMCNC: 29.2 G/DL (ref 33.7–35.3)
MCV RBC AUTO: 71.7 FL (ref 81.4–97.8)
MICROCYTES BLD QL SMEAR: ABNORMAL
MONOCYTES # BLD AUTO: 0.68 K/UL (ref 0–0.85)
MONOCYTES NFR BLD AUTO: 12.4 % (ref 0–13.4)
MORPHOLOGY BLD-IMP: NORMAL
NEUTROPHILS # BLD AUTO: 3.26 K/UL (ref 1.82–7.42)
NEUTROPHILS NFR BLD: 56.6 % (ref 44–72)
NEUTS BAND NFR BLD MANUAL: 2.7 % (ref 0–10)
NRBC # BLD AUTO: 0 K/UL
NRBC BLD-RTO: 0 /100 WBC
PLATELET # BLD AUTO: 304 K/UL (ref 164–446)
PLATELET BLD QL SMEAR: NORMAL
PMV BLD AUTO: 9.7 FL (ref 9–12.9)
POTASSIUM SERPL-SCNC: 3.2 MMOL/L (ref 3.6–5.5)
PROCALCITONIN SERPL-MCNC: 0.3 NG/ML
PROT SERPL-MCNC: 7.8 G/DL (ref 6–8.2)
RBC # BLD AUTO: 4.45 M/UL (ref 4.7–6.1)
RBC BLD AUTO: PRESENT
SIGNIFICANT IND 70042: NORMAL
SITE SITE: NORMAL
SODIUM SERPL-SCNC: 133 MMOL/L (ref 135–145)
SOURCE SOURCE: NORMAL
WBC # BLD AUTO: 5.5 K/UL (ref 4.8–10.8)

## 2017-12-30 PROCEDURE — 36415 COLL VENOUS BLD VENIPUNCTURE: CPT

## 2017-12-30 PROCEDURE — 84145 PROCALCITONIN (PCT): CPT

## 2017-12-30 PROCEDURE — 700102 HCHG RX REV CODE 250 W/ 637 OVERRIDE(OP): Performed by: INTERNAL MEDICINE

## 2017-12-30 PROCEDURE — A9270 NON-COVERED ITEM OR SERVICE: HCPCS | Performed by: INTERNAL MEDICINE

## 2017-12-30 PROCEDURE — 700102 HCHG RX REV CODE 250 W/ 637 OVERRIDE(OP): Performed by: FAMILY MEDICINE

## 2017-12-30 PROCEDURE — 85007 BL SMEAR W/DIFF WBC COUNT: CPT

## 2017-12-30 PROCEDURE — 80053 COMPREHEN METABOLIC PANEL: CPT

## 2017-12-30 PROCEDURE — 700111 HCHG RX REV CODE 636 W/ 250 OVERRIDE (IP): Performed by: FAMILY MEDICINE

## 2017-12-30 PROCEDURE — 99239 HOSP IP/OBS DSCHRG MGMT >30: CPT | Performed by: INTERNAL MEDICINE

## 2017-12-30 PROCEDURE — 93005 ELECTROCARDIOGRAM TRACING: CPT | Performed by: FAMILY MEDICINE

## 2017-12-30 PROCEDURE — A9270 NON-COVERED ITEM OR SERVICE: HCPCS | Performed by: FAMILY MEDICINE

## 2017-12-30 PROCEDURE — 93010 ELECTROCARDIOGRAM REPORT: CPT | Performed by: INTERNAL MEDICINE

## 2017-12-30 PROCEDURE — 85027 COMPLETE CBC AUTOMATED: CPT

## 2017-12-30 PROCEDURE — 71010 DX-CHEST-PORTABLE (1 VIEW): CPT

## 2017-12-30 RX ORDER — LANOLIN ALCOHOL/MO/W.PET/CERES
100 CREAM (GRAM) TOPICAL DAILY
Qty: 30 TAB | Refills: 0 | Status: SHIPPED | OUTPATIENT
Start: 2017-12-31

## 2017-12-30 RX ORDER — FOLIC ACID 1 MG/1
1 TABLET ORAL DAILY
Qty: 30 TAB | Refills: 0 | Status: SHIPPED | OUTPATIENT
Start: 2017-12-31

## 2017-12-30 RX ORDER — POTASSIUM CHLORIDE 20 MEQ/1
20 TABLET, EXTENDED RELEASE ORAL DAILY
Status: DISCONTINUED | OUTPATIENT
Start: 2017-12-30 | End: 2017-12-30 | Stop reason: HOSPADM

## 2017-12-30 RX ORDER — OSELTAMIVIR PHOSPHATE 75 MG/1
75 CAPSULE ORAL EVERY 12 HOURS
Qty: 4 CAP | Refills: 0 | Status: SHIPPED | OUTPATIENT
Start: 2017-12-30 | End: 2018-01-01

## 2017-12-30 RX ORDER — OMEPRAZOLE 20 MG/1
20 CAPSULE, DELAYED RELEASE ORAL 2 TIMES DAILY
Qty: 30 CAP | Refills: 0 | Status: SHIPPED | OUTPATIENT
Start: 2017-12-30

## 2017-12-30 RX ORDER — DOXYCYCLINE 100 MG/1
100 CAPSULE ORAL 2 TIMES DAILY
Qty: 10 CAP | Refills: 0 | Status: SHIPPED | OUTPATIENT
Start: 2017-12-30 | End: 2018-01-04

## 2017-12-30 RX ORDER — NICOTINE 21 MG/24HR
1 PATCH, TRANSDERMAL 24 HOURS TRANSDERMAL EVERY 24 HOURS
Qty: 30 PATCH | Refills: 0 | Status: SHIPPED | OUTPATIENT
Start: 2017-12-30

## 2017-12-30 RX ADMIN — Medication 100 MG: at 08:28

## 2017-12-30 RX ADMIN — FOLIC ACID 1 MG: 1 TABLET ORAL at 08:26

## 2017-12-30 RX ADMIN — AZITHROMYCIN 500 MG: 250 TABLET, FILM COATED ORAL at 08:26

## 2017-12-30 RX ADMIN — OMEPRAZOLE 20 MG: 20 CAPSULE, DELAYED RELEASE ORAL at 08:26

## 2017-12-30 RX ADMIN — THERA TABS 1 TABLET: TAB at 08:26

## 2017-12-30 RX ADMIN — OSELTAMIVIR PHOSPHATE 75 MG: 75 CAPSULE ORAL at 08:26

## 2017-12-30 RX ADMIN — POTASSIUM CHLORIDE 20 MEQ: 1500 TABLET, EXTENDED RELEASE ORAL at 14:14

## 2017-12-30 RX ADMIN — CEFTRIAXONE 2 G: 2 INJECTION, POWDER, FOR SOLUTION INTRAMUSCULAR; INTRAVENOUS at 08:26

## 2017-12-30 ASSESSMENT — PAIN SCALES - GENERAL
PAINLEVEL_OUTOF10: 0
PAINLEVEL_OUTOF10: 0

## 2017-12-30 NOTE — CARE PLAN
Problem: Communication  Goal: The ability to communicate needs accurately and effectively will improve  Outcome: PROGRESSING AS EXPECTED  POC discussed with pt. at bedside. All questions and concerns have been addressed at this time. Verbal understanding recieved    Problem: Safety  Goal: Will remain free from falls  Outcome: PROGRESSING AS EXPECTED  Katie Pires Fall Risk Assessment:     Last Known Fall: No falls  Mobility: No limitations  Medications: No meds  Mental Status/LOC/Awareness: Awake, alert, and oriented to date, place, and person  Toileting Needs: No needs  Volume/Electrolyte Status: Low blood sugar/electrolyte imbalances  Communication/Sensory: Visual (Glasses)/hearing deficit  Behavior: Appropriate behavior  Katie Pires Fall Risk Total: 7  Fall Risk Level: LOW RISK    Universal Fall Precautions:  call light/belongings in reach, bed in low position and locked, siderails up x 2, adequate lighting, educate on level of risk, educate to call for assistance    Fall Risk Level Interventions:   TRIAL (TELE 8, NEURO, MED SUZI 5) Low Fall Risk Interventions  Place yellow fall risk ID band on patient: verified  Provide patient/family education based on risk assessment: completed  Educate patient/family to call staff for assistance when getting out of bed: completed  Place fall precaution signage outside patient door: verified      Patient Specific Interventions:     Medication: review medications with patient and family  Mental Status/LOC/Awareness: reinforce falls education and reinforce the use of call light  Toileting: provide frquent toileting  Volume/Electrolyte Status: monitor abnormal lab values and ensure IV fluids are appropriate  Communication/Sensory: update plan of care on whiteboard  Behavioral: engage patient in daily activities and administer medication as ordered  Mobility: ensure bed is locked and in lowest position and instruct patient to exit bed on their strongest side

## 2017-12-30 NOTE — PROGRESS NOTES
[facility] Hospitalist Progress Note    Date of Service: 2017    Chief Complaint  52 y.o. male admitted 2017 with acute anemia with unclear source of bleed, influenza B, history of alcohol abuse on fever protocol.    Interval Problem Update  Reports improving shortness of breath, on room air oxygenation  Report night sweats for the last 2-3 months with associated weight loss  Significant family history of lung cancer including his father  Continues to smoke half a pack to three quarters pack per day  Continues to drink 3 16 ounce cans up. Daily, last drink was 2-3 days ago  Reports generalized body aches, however feels better      Consultants/Specialty  None    Disposition  Pending        Review of Systems   Constitutional: Positive for weight loss. Negative for chills, diaphoresis, fever and malaise/fatigue.   HENT: Positive for congestion. Negative for hearing loss.    Eyes: Negative for blurred vision and double vision.   Respiratory: Positive for cough, sputum production, shortness of breath and wheezing.    Cardiovascular: Negative for chest pain, palpitations and leg swelling.   Gastrointestinal: Negative for abdominal pain, blood in stool, diarrhea, melena, nausea and vomiting.   Genitourinary: Negative for dysuria, flank pain and hematuria.   Musculoskeletal: Positive for myalgias. Negative for back pain and joint pain.   Neurological: Positive for weakness. Negative for dizziness, tremors, focal weakness and headaches.   Psychiatric/Behavioral: Positive for substance abuse. Negative for depression and memory loss. The patient is not nervous/anxious.       Physical Exam  Laboratory/Imaging   Hemodynamics  Temp (24hrs), Av °C (98.6 °F), Min:36.3 °C (97.3 °F), Max:38.2 °C (100.8 °F)   Temperature: 37.1 °C (98.7 °F)  Pulse  Av.4  Min: 67  Max: 120    Blood Pressure: 127/86      Respiratory      Respiration: 18, Pulse Oximetry: 96 %        RUL Breath Sounds: Diminished, RML Breath Sounds:  Diminished, RLL Breath Sounds: Diminished, LADARIUS Breath Sounds: Diminished, LLL Breath Sounds: Diminished    Fluids  No intake or output data in the 24 hours ending 12/29/17 1650    Nutrition  Orders Placed This Encounter   Procedures   • Diet Order     Standing Status:   Standing     Number of Occurrences:   1     Order Specific Question:   Diet:     Answer:   Regular [1]     Physical Exam   Constitutional: He is oriented to person, place, and time. He appears well-nourished. No distress.   thin   HENT:   Head: Normocephalic and atraumatic.   Nose: Nose normal.   Mouth/Throat: No oropharyngeal exudate.   Eyes: EOM are normal. Pupils are equal, round, and reactive to light. Right eye exhibits no discharge. Left eye exhibits no discharge. No scleral icterus.   Neck: Neck supple. No thyromegaly present.   Cardiovascular: Normal rate and intact distal pulses.    No murmur heard.  Pulmonary/Chest: Effort normal. No respiratory distress. He has no wheezes. He has rales. He exhibits no tenderness.   Abdominal: Soft. Bowel sounds are normal. He exhibits no distension. There is no tenderness.   Musculoskeletal: He exhibits no edema or tenderness.   Neurological: He is alert and oriented to person, place, and time. No cranial nerve deficit. Coordination normal.   Skin: Skin is warm and dry. No rash noted. He is not diaphoretic. No erythema. No pallor.   Psychiatric: He has a normal mood and affect. His behavior is normal. Judgment and thought content normal.   Nursing note and vitals reviewed.      Recent Labs      12/28/17   1537  12/29/17   0151  12/29/17   1105   WBC  5.9  5.6  5.3   RBC  3.92*  4.03*  4.27*   HEMOGLOBIN  8.2*  8.8*  9.1*   HEMATOCRIT  27.7*  29.0*  30.6*   MCV  70.7*  72.0*  71.7*   MCH  20.9*  21.8*  21.3*   MCHC  29.6*  30.3*  29.7*   RDW  65.3*  66.5*  66.0*   PLATELETCT  255  290  287   MPV  9.7  9.7  9.3     Recent Labs      12/28/17   0147  12/29/17   0151   SODIUM  125*  133*   POTASSIUM  3.1*  3.3*    CHLORIDE  91*  103   CO2  20  23   GLUCOSE  92  88   BUN  15  7*   CREATININE  0.72  0.45*   CALCIUM  8.3*  8.4*     Recent Labs      12/28/17   0147   APTT  36.8*   INR  1.07     Recent Labs      12/28/17   0147   BNPBTYPENAT  63              Assessment/Plan     * Anemia requiring transfusions   Assessment & Plan    The patient was found to have hemoglobin of 5.5. Stool guaiac Was negative. Patient denies any hematemesis or Coffee emesis. He denies any hematochezia or melena. 2 PRBCs units will be transfused. Monitor H&H. PPI and Carafate.    12/29: Status post transfusion  Hemoglobin of 5.528  Appears stable  Continue to monitor H&H every 6  Transfuse if hemoglobin less than 7  INR 1.07  Denies any prior history of GI bleed, rectal bleed, hematemesis  Does report episodes of bleeding from his lip due to a wound    Patient does report night sweats for the last 2-3 months, family history of malignancy including multiple family members with lung cancer and a history of smoking  Patient does have risk factors for lung cancer   we'll follow-up chest x-ray        Follow-up occult blood testing  We'll consult GI as needed        Hyponatremia- (present on admission)   Assessment & Plan    Likely chronic secondary to alcohol abuse.   Continue IV fluids  Improving        Influenza A- (present on admission)   Assessment & Plan    Tamiflu 5 days course. Droplet isolation precautions.        Pneumonia   Assessment & Plan    Community acquired pneumonia. Influenza screen was positive for influenza A. Components of bacterial and viral possible. On IV antibiotics and Tamiflu. Sputum was sent for culture.  Follow-up pro calcitonin- slightly elevated at 1.12  Transition to oral antibiotics in a.m.        Hypokalemia- (present on admission)   Assessment & Plan    On replacement.        Alcohol abuse- (present on admission)   Assessment & Plan    Placed on [facility] protocol for alcohol withdrawal.  Banana bag. Folic acid and  thiamine.  With minimal lft elevation  - Follow up panel              Reviewed items::  Labs reviewed, Medications reviewed and Radiology images reviewed  DVT prophylaxis - mechanical:  SCDs  Ulcer Prophylaxis::  Yes  Antibiotics:  Treating active infection/contamination beyond 24 hours perioperative coverage

## 2017-12-30 NOTE — DISCHARGE INSTRUCTIONS
Discharge Instructions    Discharged to home by car with self. Discharged via walking, hospital escort: Refused.  Special equipment needed: Not Applicable    Be sure to schedule a follow-up appointment with your primary care doctor or any specialists as instructed.     Discharge Plan:   Diet Plan: Discussed  Activity Level: Discussed  Smoking Cessation Offered: Patient Refused  Confirmed Follow up Appointment: Patient to Call and Schedule Appointment  Confirmed Symptoms Management: Discussed  Medication Reconciliation Updated: Yes  Pneumococcal Vaccine Given - only chart on this line when given: Given (See MAR)  Influenza Vaccine Indication: Patient Refuses    I understand that a diet low in cholesterol, fat, and sodium is recommended for good health. Unless I have been given specific instructions below for another diet, I accept this instruction as my diet prescription.   Other diet: Regular    Special Instructions: Follow up with LILO Navarro as outpatient.    · Is patient discharged on Warfarin / Coumadin?   No     · Is patient Post Blood Transfusion? Yes  Yes  POST BLOOD TRANSFUSION INFORMATION (ADULT)    The purpose of blood transfusion is to correct anemia, low levels of blood clotting factors or to correct acute blood loss.   Blood transfusion is very safe but occasionally unexpected adverse reactions do occur. Most adverse reactions occur during transfusion, within one to two days following transfusion or one to two weeks following transfusion. Some adverse reactions can occur one to six months after transfusion and some even years later.             If any of the symptoms listed below happen to you during your transfusion,                                 please notify your nurse immediately.   · Fever or Chills  · Flushing of the Face  · Hives, rash or itching  · Difficulty in breathing or shortness of breath  · Pain or oozing of blood from the IV needle site  · Low back pain  · Nausea or vomiting  · Weakness or  fainting  · Chest pain  · Blood in the urine  · Decreased frequency of urination    The above symptoms may also occur within 24 to 48 after transfusion; if so, notify your physician.     · Yellowing of the skin    This can happen one to six months after transfusion; if so, notify your physician    Depression / Suicide Risk    As you are discharged from this [facility] facility, it is important to learn how to keep safe from harming yourself.    Recognize the warning signs:  · Abrupt changes in personality, positive or negative- including increase in energy   · Giving away possessions  · Change in eating patterns- significant weight changes-  positive or negative  · Change in sleeping patterns- unable to sleep or sleeping all the time   · Unwillingness or inability to communicate  · Depression  · Unusual sadness, discouragement and loneliness  · Talk of wanting to die  · Neglect of personal appearance   · Rebelliousness- reckless behavior  · Withdrawal from people/activities they love  · Confusion- inability to concentrate     If you or a loved one observes any of these behaviors or has concerns about self-harm, here's what you can do:  · Talk about it- your feelings and reasons for harming yourself  · Remove any means that you might use to hurt yourself (examples: pills, rope, extension cords, firearm)  · Get professional help from the community (Mental Health, Substance Abuse, psychological counseling)  · Do not be alone:Call your Safe Contact- someone whom you trust who will be there for you.  · Call your local CRISIS HOTLINE 356-7180 or 654-871-7456  · Call your local Children's Mobile Crisis Response Team [place] (470) 507-8822 or www.[employer]  · Call the toll free National Suicide Prevention Hotlines   · National Suicide Prevention Lifeline 386-117-SMBW (3662)  · National Hope Line Network 800-SUICIDE (419-7876)    Anemia, Nonspecific  Anemia is a condition in which the concentration of red  blood cells or hemoglobin in the blood is below normal. Hemoglobin is a substance in red blood cells that carries oxygen to the tissues of the body. Anemia results in not enough oxygen reaching these tissues.   CAUSES   Common causes of anemia include:   · Excessive bleeding. Bleeding may be internal or external. This includes excessive bleeding from periods (in women) or from the intestine.    · Poor nutrition.    · Chronic kidney, thyroid, and liver disease.   · Bone marrow disorders that decrease red blood cell production.  · Cancer and treatments for cancer.  · HIV, AIDS, and their treatments.  · Spleen problems that increase red blood cell destruction.  · Blood disorders.  · Excess destruction of red blood cells due to infection, medicines, and autoimmune disorders.  SIGNS AND SYMPTOMS   · Minor weakness.    · Dizziness.    · Headache.  · Palpitations.    · Shortness of breath, especially with exercise.    · Paleness.  · Cold sensitivity.  · Indigestion.  · Nausea.  · Difficulty sleeping.  · Difficulty concentrating.  Symptoms may occur suddenly or they may develop slowly.   DIAGNOSIS   Additional blood tests are often needed. These help your health care provider determine the best treatment. Your health care provider will check your stool for blood and look for other causes of blood loss.   TREATMENT   Treatment varies depending on the cause of the anemia. Treatment can include:   · Supplements of iron, vitamin B12, or folic acid.    · Hormone medicines.    · A blood transfusion. This may be needed if blood loss is severe.    · Hospitalization. This may be needed if there is significant continual blood loss.    · Dietary changes.  · Spleen removal.  HOME CARE INSTRUCTIONS  Keep all follow-up appointments. It often takes many weeks to correct anemia, and having your health care provider check on your condition and your response to treatment is very important.  SEEK IMMEDIATE MEDICAL CARE IF:   · You develop  extreme weakness, shortness of breath, or chest pain.    · You become dizzy or have trouble concentrating.  · You develop heavy vaginal bleeding.    · You develop a rash.    · You have bloody or black, tarry stools.    · You faint.    · You vomit up blood.    · You vomit repeatedly.    · You have abdominal pain.  · You have a fever or persistent symptoms for more than 2-3 days.    · You have a fever and your symptoms suddenly get worse.    · You are dehydrated.    MAKE SURE YOU:  · Understand these instructions.  · Will watch your condition.  · Will get help right away if you are not doing well or get worse.     This information is not intended to replace advice given to you by your health care provider. Make sure you discuss any questions you have with your health care provider.     Document Released: 01/25/2006 Document Revised: 08/20/2014 Document Reviewed: 06/13/2014  [employer] Interactive Patient Education ©2016 [employer] Inc.    Smoking Cessation, Tips for Success  If you are ready to quit smoking, congratulations! You have chosen to help yourself be healthier. Cigarettes bring nicotine, tar, carbon monoxide, and other irritants into your body. Your lungs, heart, and blood vessels will be able to work better without these poisons. There are many different ways to quit smoking. Nicotine gum, nicotine patches, a nicotine inhaler, or nicotine nasal spray can help with physical craving. Hypnosis, support groups, and medicines help break the habit of smoking.  WHAT THINGS CAN I DO TO MAKE QUITTING EASIER?   Here are some tips to help you quit for good:  · Pick a date when you will quit smoking completely. Tell all of your friends and family about your plan to quit on that date.  · Do not try to slowly cut down on the number of cigarettes you are smoking. Pick a quit date and quit smoking completely starting on that day.  · Throw away all cigarettes.    · Clean and remove all ashtrays from your home, work, and  "car.  · On a card, write down your reasons for quitting. Carry the card with you and read it when you get the urge to smoke.  · Cleanse your body of nicotine. Drink enough water and fluids to keep your urine clear or pale yellow. Do this after quitting to flush the nicotine from your body.  · Learn to predict your moods. Do not let a bad situation be your excuse to have a cigarette. Some situations in your life might tempt you into wanting a cigarette.  · Never have \"just one\" cigarette. It leads to wanting another and another. Remind yourself of your decision to quit.  · Change habits associated with smoking. If you smoked while driving or when feeling stressed, try other activities to replace smoking. Stand up when drinking your coffee. Brush your teeth after eating. Sit in a different chair when you read the paper. Avoid alcohol while trying to quit, and try to drink fewer caffeinated beverages. Alcohol and caffeine may urge you to smoke.  · Avoid foods and drinks that can trigger a desire to smoke, such as sugary or spicy foods and alcohol.  · Ask people who smoke not to smoke around you.  · Have something planned to do right after eating or having a cup of coffee. For example, plan to take a walk or exercise.  · Try a relaxation exercise to calm you down and decrease your stress. Remember, you may be tense and nervous for the first 2 weeks after you quit, but this will pass.  · Find new activities to keep your hands busy. Play with a pen, coin, or rubber band. Doodle or draw things on paper.  · Brush your teeth right after eating. This will help cut down on the craving for the taste of tobacco after meals. You can also try mouthwash.    · Use oral substitutes in place of cigarettes. Try using lemon drops, carrots, cinnamon sticks, or chewing gum. Keep them handy so they are available when you have the urge to smoke.  · When you have the urge to smoke, try deep breathing.  · Designate your home as a nonsmoking " "area.  · If you are a heavy smoker, ask your health care provider about a prescription for nicotine chewing gum. It can ease your withdrawal from nicotine.  · Reward yourself. Set aside the cigarette money you save and buy yourself something nice.  · Look for support from others. Join a support group or smoking cessation program. Ask someone at home or at work to help you with your plan to quit smoking.  · Always ask yourself, \"Do I need this cigarette or is this just a reflex?\" Tell yourself, \"Today, I choose not to smoke,\" or \"I do not want to smoke.\" You are reminding yourself of your decision to quit.  · Do not replace cigarette smoking with electronic cigarettes (commonly called e-cigarettes). The safety of e-cigarettes is unknown, and some may contain harmful chemicals.  · If you relapse, do not give up! Plan ahead and think about what you will do the next time you get the urge to smoke.  HOW WILL I FEEL WHEN I QUIT SMOKING?  You may have symptoms of withdrawal because your body is used to nicotine (the addictive substance in cigarettes). You may crave cigarettes, be irritable, feel very hungry, cough often, get headaches, or have difficulty concentrating. The withdrawal symptoms are only temporary. They are strongest when you first quit but will go away within 10-14 days. When withdrawal symptoms occur, stay in control. Think about your reasons for quitting. Remind yourself that these are signs that your body is healing and getting used to being without cigarettes. Remember that withdrawal symptoms are easier to treat than the major diseases that smoking can cause.   Even after the withdrawal is over, expect periodic urges to smoke. However, these cravings are generally short lived and will go away whether you smoke or not. Do not smoke!  WHAT RESOURCES ARE AVAILABLE TO HELP ME QUIT SMOKING?  Your health care provider can direct you to community resources or hospitals for support, which may include:  · Group " support.  · Education.  · Hypnosis.  · Therapy.     This information is not intended to replace advice given to you by your health care provider. Make sure you discuss any questions you have with your health care provider.     Document Released: 09/15/2005 Document Revised: 01/08/2016 Document Reviewed: 06/05/2014  [employer] Interactive Patient Education ©2016 [employer]    Alcohol Abuse and Nutrition  Alcohol abuse is any pattern of alcohol consumption that harms your health, relationships, or work. Alcohol abuse can affect how your body breaks down and absorbs nutrients from food by causing your liver to work abnormally. Additionally, many people who abuse alcohol do not eat enough carbohydrates, protein, fat, vitamins, and minerals. This can cause poor nutrition (malnutrition) and a lack of nutrients (nutrient deficiencies), which can lead to further complications.  Nutrients that are commonly lacking (deficient) among people who abuse alcohol include:  · Vitamins.  ¨ Vitamin A. This is stored in your liver. It is important for your vision, metabolism, and ability to fight off infections (immunity).  ¨ B vitamins. These include vitamins such as folate, thiamin, and niacin. These are important in new cell growth and maintenance.  ¨ Vitamin C. This plays an important role in iron absorption, wound healing, and immunity.  ¨ Vitamin D. This is produced by your liver, but you can also get vitamin D from food. Vitamin D is necessary for your body to absorb and use calcium.  · Minerals.  ¨ Calcium. This is important for your bones and your heart and blood vessel (cardiovascular) function.  ¨ Iron. This is important for blood, muscle, and nervous system functioning.  ¨ Magnesium. This plays an important role in muscle and nerve function, and it helps to control blood sugar and blood pressure.  ¨ Zinc. This is important for the normal function of your nervous system and digestive system (gastrointestinal  tract).  Nutrition is an essential component of therapy for alcohol abuse. Your health care provider or dietitian will work with you to design a plan that can help restore nutrients to your body and prevent potential complications.  WHAT IS MY PLAN?  Your dietitian may develop a specific diet plan that is based on your condition and any other complications you may have. A diet plan will commonly include:  · A balanced diet.  ¨ Grains: 6-8 oz per day.  ¨ Vegetables: 2-3 cups per day.  ¨ Fruits: 1-2 cups per day.  ¨ Meat and other protein: 5-6 oz per day.  ¨ Dairy: 2-3 cups per day.  · Vitamin and mineral supplements.  WHAT DO I NEED TO KNOW ABOUT ALCOHOL AND NUTRITION?  · Consume foods that are high in antioxidants, such as grapes, berries, nuts, green tea, and dark green and orange vegetables. This can help to counteract some of the stress that is placed on your liver by consuming alcohol.  · Avoid food and drinks that are high in fat and sugar. Foods such as sugared soft drinks, salty snack foods, and candy contain empty calories. This means that they lack important nutrients such as protein, fiber, and vitamins.  · Eat frequent meals and snacks. Try to eat 5-6 small meals each day.  · Eat a variety of fresh fruits and vegetables each day. This will help you get plenty of water, fiber, and vitamins in your diet.  · Drink plenty of water and other clear fluids. Try to drink at least 48-64 oz (1.5-2 L) of water per day.  · If you are a vegetarian, eat a variety of protein-rich foods. Pair whole grains with plant-based proteins at meals and snacks to obtain the greatest nutrient benefit from your food. For example, eat rice with beans, put peanut butter on whole-grain toast, or eat oatmeal with sunflower seeds.  · Soak beans and whole grains overnight before cooking. This can help your body to absorb the nutrients more easily.  · Include foods fortified with vitamins and minerals in your diet. Commonly fortified foods  include milk, orange juice, cereal, and bread.  · If you are malnourished, your dietitian may recommend a high-protein, high-calorie diet. This may include:  ¨ 2,000-3,000 calories (kilocalories) per day.  ¨  grams of protein per day.  · Your health care provider may recommend a complete nutritional supplement beverage. This can help to restore calories, protein, and vitamins to your body. Depending on your condition, you may be advised to consume this instead of or in addition to meals.  · Limit your intake of caffeine. Replace drinks like coffee and black tea with decaffeinated coffee and herbal tea.  · Eat a variety of foods that are high in omega fatty acids. These include fish, nuts and seeds, and soybeans. These foods may help your liver to recover and may also stabilize your mood.  · Certain medicines may cause changes in your appetite, taste, and weight. Work with your health care provider and dietitian to make any adjustments to your medicines and diet plan.  · Include other healthy lifestyle choices in your daily routine.  ¨ Be physically active.  ¨ Get enough sleep.  ¨ Spend time doing activities that you enjoy.  · If you are unable to take in enough food and calories by mouth, your health care provider may recommend a feeding tube. This is a tube that passes through your nose and throat, directly into your stomach. Nutritional supplement beverages can be given to you through the feeding tube to help you get the nutrients you need.  · Take vitamin or mineral supplements as recommended by your health care provider.  WHAT FOODS CAN I EAT?  Grains  Enriched pasta. Enriched rice. Fortified whole-grain bread. Fortified whole-grain cereal. Barley. Brown rice. Quinoa. Millet.  Vegetables  All fresh, frozen, and canned vegetables. Spinach. Kale. Artichoke. Carrots. Winter squash and pumpkin. Sweet potatoes. Broccoli. Cabbage. Cucumbers. Tomatoes. Sweet peppers. Green beans. Peas. Corn.  Fruits  All fresh  and frozen fruits. Berries. Grapes. [place]. Papaya. Guava. Cherries. Apples. Bananas. Peaches. Plums. Pineapple. Watermelon. Cantaloupe. Oranges. Avocado.  Meats and Other Protein Sources  Beef liver. Lean beef. Pork. Fresh and canned chicken. Fresh fish. Oysters. Sardines. Canned tuna. Shrimp. Eggs with yolks. Nuts and seeds. Peanut butter. Beans and lentils. Soybeans. Tofu.  Dairy  Whole, low-fat, and nonfat milk. Whole, low-fat, and nonfat yogurt. Cottage cheese. Sour cream. Hard and soft cheeses.  Beverages  Water. Herbal tea. Decaffeinated coffee. Decaffeinated green tea. 100% fruit juice. 100% vegetable juice. Instant breakfast shakes.  Condiments  Ketchup. Mayonnaise. Mustard. Salad dressing. Barbecue sauce.  Sweets and Desserts  Sugar-free ice cream. Sugar-free pudding. Sugar-free gelatin.  Fats and Oils  Butter. Vegetable oil, flaxseed oil, olive oil, and walnut oil.  Other  Complete nutrition shakes. Protein bars. Sugar-free gum.  The items listed above may not be a complete list of recommended foods or beverages. Contact your dietitian for more options.  WHAT FOODS ARE NOT RECOMMENDED?  Grains  Sugar-sweetened breakfast cereals. Flavored instant oatmeal. Fried breads.  Vegetables  Breaded or deep-fried vegetables.  Fruits  Dried fruit with added sugar. Candied fruit. Canned fruit in syrup.  Meats and Other Protein Sources  Breaded or deep-fried meats.  Dairy  Flavored milks. Fried cheese curds or fried cheese sticks.  Beverages  Alcohol. Sugar-sweetened soft drinks. Sugar-sweetened tea. Caffeinated coffee and tea.  Condiments  Sugar. Honey. Agave nectar. Molasses.  Sweets and Desserts  Chocolate. Cake. Cookies. Candy.  Other  Potato chips. Pretzels. Salted nuts. Candied nuts.  The items listed above may not be a complete list of foods and beverages to avoid. Contact your dietitian for more information.     This information is not intended to replace advice given to you by your health care provider. Make  sure you discuss any questions you have with your health care provider.     Document Released: 10/12/2006 Document Revised: 01/08/2016 Document Reviewed: 07/21/2015  [employer] Interactive Patient Education ©2016 [employer].      Pneumonia, Adult  Pneumonia is an infection of the lungs.   CAUSES  Pneumonia may be caused by bacteria or a virus. Usually, the infection is caused by breathing in droplets from an infected person's cough or sneeze.   SYMPTOMS   Symptoms of pneumonia include:  · Cough.  · Fever.  · Chest pain.  · Rapid breathing.  · Shortness of breath.  · Shaking chills.  · Mucus production.  DIAGNOSIS   If you have the common symptoms of pneumonia, often your health care provider will confirm the diagnosis with a chest X-ray. The X-ray will show an abnormality in the lung if you have pneumonia. Other tests may be done on your blood, urine, or mucus (sputum) to find the specific cause of your pneumonia. A blood gas test or pulse oximetry test may be needed to check how well your lungs are working.  TREATMENT   Your treatment will depend on whether your pneumonia is caused by bacteria or a virus.   · Bacterial pneumonia is treated with antibiotic medicine.  · Pneumonia that is caused by the influenza virus may be treated with an antiviral medicine.  · Pneumonia that is caused by a virus other than influenza will not respond to antibiotic medicine. This type of pneumonia will have to run its course.   HOME CARE INSTRUCTIONS   · Cough suppressants may be used if you are losing too much rest from coughing at night. However, you should try to avoid taking cough suppresants. This is because coughing helps to remove mucus from your lungs.  · Sleep in a semi-upright position at night. Try sleeping in a reclining chair, or place a few pillows under your head.  · Try using a cold steam vaporizer or humidifier in your home or bedroom. This may help loosen your mucus.  · If you were prescribed an antibiotic medicine,  finish all of it even if you start to feel better.  · If you were prescribed an expectorant, take it as directed by your health care provider. This medicine loosens the mucus so you can cough it up.  · Take medicines only as directed by your health care provider.  · Do not smoke. If you are a smoker and continue to smoke, your cough may last several weeks after your pneumonia has cleared.  · Get rest when you feel tired, or as needed.  PREVENTION  A pneumococcal shot (vaccine) is available to prevent a common bacterial cause of pneumonia. This is usually suggested for:  · People over 65 years old.  · People on chemotherapy.  · People with chronic lung problems, such as bronchitis or emphysema.  · People with immune system problems.  If you are over 65 years old or have a high risk condition, you may receive the pneumococcal vaccine if you have not received it before. In some countries, a routine influenza vaccine is also recommended. This vaccine can help prevent some cases of pneumonia. You may be offered the influenza vaccine as part of your care.  If you are a smoker, it is time to quit in order to prevent pneumonia in the future. You may receive instructions on how to stop smoking. Your health care provider can provide medicines and counseling to help you quit.  SEEK MEDICAL CARE IF:  · You have a fever.  · You cannot control your cough with suppressants at night, and you keep losing sleep.  SEEK IMMEDIATE MEDICAL CARE IF:   · You have worsening shortness of breath.  · You have increased chest pain.  · Your sickness becomes worse, especially if you are an older adult or have a weakened immune system.  · You cough up blood.  · You have pain that is getting worse or is not controlled with medicines.  · Your symptoms are getting worse rather than better.     This information is not intended to replace advice given to you by your health care provider. Make sure you discuss any questions you have with your health  care provider.     Document Released: 12/18/2006 Document Revised: 01/08/2016 Document Reviewed: 04/13/2016  [employer] Interactive Patient Education ©2016 [employer].

## 2017-12-30 NOTE — PROGRESS NOTES
Pt dc'd home. IV and monitor removed; monitor room notified. Pt left unit via walking; refused transport. Personal belongings with pt when leaving unit. Pt given discharge instructions prior to leaving unit including prescription and resources where to fill; when to visit with physician; verbalized understanding. Copy of discharge instructions with pt and in the chart.

## 2017-12-30 NOTE — PROGRESS NOTES
Assumed care of pt. at 1900    Bedside report received from Day Shift RN   POC discussed with pt. At bedside and Pt. verbalizes understanding.  Pt. Is Awake and AOx 4, Droplet precautions in place , running SR 93 on the monitor  Call light within reach  with appropriate instructions to call for assistance  Bed locked and in lowest position.

## 2017-12-30 NOTE — DISCHARGE SUMMARY
CHIEF COMPLAINT ON ADMISSION  Chief Complaint   Patient presents with   • Shortness of Breath   • Body Aches       CODE STATUS  Full Code    HPI & HOSPITAL COURSE  This is a 52 y.o. male here with  acute anemia with unclear source of bleed, influenza B, history of alcohol abuse on ciwa protocol.Patient was noted to have a hemoglobin of 5.5 on admission and did receive 2 units of PRBC in the emergency room. Patient's H&H remained stable during this admission. Patient denies any associated hematochezia or melena or hematemesis. Patient does have a history significant history of alcohol abuse, however denies any associated abdominal pain or hematemesis.    We continued to monitor patient's H&H during this admission which remained stable. Patient was hemodynamically stable and advised to follow up with GI as an outpatient for further endoscopy as needed to evaluate for possible acute blood loss.    In our differential, malignancy is considered, patient does report night sweats for the last 2-3 months with associated weight loss. Patient does admit to long history of tobacco abuse with family history of lung cancer. Patient did have a chest x-ray consistent with bilateral pulmonary infiltrates, no masses noted. We have advised patient to follow-up as an outpatient with primary care provider for further cancer screening as needed.    Patient was placed on ciwa protocol during this admission with no signs of alcohol withdrawal. Patient states that he is interested in alcohol cessation and would like some information regarding participation in rehab programs. We have asked our  to provide this information.    We have also advised patient to continue tobacco cessation.  Patient was started on Tamiflu for his influenza. Patient's respiratory symptoms have significantly improved. Patient was also placed on antibiotics for superimposed pneumonia. Pro-calcitonin is slightly elevated. Cultures remain negative.  Patient has been transitioned to oral antibiotics to finish off a course of antibiotics for treatment. Patient is saturating well on room air.    At this time patient appears to be near baseline function. Patient's symptoms have significantly improved. Patient does not appear to be in alcohol withdrawal and is advised to continued tobacco as well as alcohol cessation. Patient will need outpatient workup for acute blood loss anemia which appears to have stabilized.    Note patient does have a significant tobacco use history however does not appear to be in COPD exacerbation. Patient may certainly have a mild degree of COPD.    Therefore, he is discharged in good and stable condition with close outpatient follow-up.    SPECIFIC OUTPATIENT FOLLOW-UP  Primary care provider/discharge clinic in one week  Doxycycline for 5 days  Tamiflu for 2 days    DISCHARGE PROBLEM LIST  Principal Problem:    Anemia requiring transfusions POA: Unknown  Active Problems:    Alcohol abuse POA: Yes    Hypokalemia POA: Yes    Pneumonia POA: Unknown    Hyponatremia POA: Yes    Influenza B POA: Unknown  Resolved Problems:    * No resolved hospital problems. *      FOLLOW UP  No future appointments.  No follow-up provider specified.    MEDICATIONS ON DISCHARGE   [facility] Medication Instructions ANDREA:[id number]    Printed on:12/30/17 1052   Medication Information                      doxycycline (MONODOX) 100 MG capsule  Take 1 Cap by mouth 2 times a day for 5 days.             folic acid (FOLVITE) 1 MG Tab  Take 1 Tab by mouth every day.             multivitamin (THERAGRAN) Tab  Take 1 Tab by mouth every day.             nicotine (NICODERM) 21 MG/24HR PATCH 24 HR  Apply 1 Patch to skin as directed every 24 hours.             omeprazole (PRILOSEC) 20 MG delayed-release capsule  Take 1 Cap by mouth 2 Times a Day.             oseltamivir (TAMIFLU) 75 MG Cap  Take 1 Cap by mouth every 12 hours for 2 days.             thiamine  (THIAMINE) 100 MG tablet  Take 1 Tab by mouth every day.                 DIET  Orders Placed This Encounter   Procedures   • Diet Order     Standing Status:   Standing     Number of Occurrences:   1     Order Specific Question:   Diet:     Answer:   Regular [1]       ACTIVITY  As tolerated.  Weight bearing as tolerated      CONSULTATIONS  None    PROCEDURES  None    LABORATORY  Lab Results   Component Value Date/Time    SODIUM 133 (L) 12/30/2017 02:18 AM    POTASSIUM 3.2 (L) 12/30/2017 02:18 AM    CHLORIDE 101 12/30/2017 02:18 AM    CO2 22 12/30/2017 02:18 AM    GLUCOSE 87 12/30/2017 02:18 AM    BUN 8 12/30/2017 02:18 AM    CREATININE 0.45 (L) 12/30/2017 02:18 AM        Lab Results   Component Value Date/Time    WBC 5.5 12/30/2017 02:18 AM    HEMOGLOBIN 9.3 (L) 12/30/2017 02:18 AM    HEMATOCRIT 31.9 (L) 12/30/2017 02:18 AM    PLATELETCT 304 12/30/2017 02:18 AM        Total time of the discharge process exceeds 45 minutes

## 2018-01-02 LAB
BACTERIA BLD CULT: NORMAL
BACTERIA BLD CULT: NORMAL
SIGNIFICANT IND 70042: NORMAL
SIGNIFICANT IND 70042: NORMAL
SITE SITE: NORMAL
SITE SITE: NORMAL
SOURCE SOURCE: NORMAL
SOURCE SOURCE: NORMAL

## 2018-05-16 ENCOUNTER — HOSPITAL ENCOUNTER (EMERGENCY)
Dept: HOSPITAL 8 - ED | Age: 53
Discharge: HOME | End: 2018-05-16
Payer: MEDICAID

## 2018-05-16 VITALS — DIASTOLIC BLOOD PRESSURE: 91 MMHG | SYSTOLIC BLOOD PRESSURE: 164 MMHG

## 2018-05-16 VITALS — HEIGHT: 69 IN | BODY MASS INDEX: 18.91 KG/M2 | WEIGHT: 127.65 LBS
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
## 2018-05-16 DIAGNOSIS — F17.210: ICD-10-CM

## 2018-05-16 DIAGNOSIS — S80.211A: Primary | ICD-10-CM
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
## 2018-05-16 PROCEDURE — 96372 THER/PROPH/DIAG INJ SC/IM: CPT

## 2018-05-16 PROCEDURE — 90715 TDAP VACCINE 7 YRS/> IM: CPT

## 2018-05-16 PROCEDURE — 99284 EMERGENCY DEPT VISIT MOD MDM: CPT

## 2018-11-20 ENCOUNTER — HOSPITAL ENCOUNTER (INPATIENT)
Dept: HOSPITAL 8 - ED | Age: 53
LOS: 5 days | Discharge: HOME | DRG: 896 | End: 2018-11-25
Attending: INTERNAL MEDICINE | Admitting: INTERNAL MEDICINE
Payer: MEDICAID

## 2018-11-20 VITALS — WEIGHT: 115.74 LBS | BODY MASS INDEX: 17.14 KG/M2 | HEIGHT: 69 IN

## 2018-11-20 DIAGNOSIS — D69.6: ICD-10-CM

## 2018-11-20 DIAGNOSIS — G93.41: ICD-10-CM

## 2018-11-20 DIAGNOSIS — E83.42: ICD-10-CM

## 2018-11-20 DIAGNOSIS — N17.9: ICD-10-CM

## 2018-11-20 DIAGNOSIS — E83.39: ICD-10-CM
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
## 2018-11-20 DIAGNOSIS — F41.9: ICD-10-CM

## 2018-11-20 DIAGNOSIS — F17.210: ICD-10-CM

## 2018-11-20 DIAGNOSIS — F10.239: Primary | ICD-10-CM

## 2018-11-20 DIAGNOSIS — E86.0: ICD-10-CM

## 2018-11-20 DIAGNOSIS — S00.81XA: ICD-10-CM

## 2018-11-20 DIAGNOSIS — E87.6: ICD-10-CM

## 2018-11-20 DIAGNOSIS — R29.6: ICD-10-CM

## 2018-11-20 DIAGNOSIS — M54.9: ICD-10-CM
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
## 2018-11-20 DIAGNOSIS — E43: ICD-10-CM
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
## 2018-11-20 DIAGNOSIS — E87.1: ICD-10-CM

## 2018-11-20 DIAGNOSIS — S61.216A: ICD-10-CM

## 2018-11-20 LAB
ALBUMIN SERPL-MCNC: 4.3 G/DL (ref 3.4–5)
ALP SERPL-CCNC: 87 U/L (ref 45–117)
ALT SERPL-CCNC: 181 U/L (ref 12–78)
ANION GAP SERPL CALC-SCNC: 22 MMOL/L (ref 5–15)
BASOPHILS # BLD AUTO: 0.02 X10^3/UL (ref 0–0.1)
BASOPHILS NFR BLD AUTO: 0 % (ref 0–1)
BILIRUB SERPL-MCNC: 3.8 MG/DL (ref 0.2–1)
CALCIUM SERPL-MCNC: 9.2 MG/DL (ref 8.5–10.1)
CHLORIDE SERPL-SCNC: 68 MMOL/L (ref 98–107)
CREAT SERPL-MCNC: 1.61 MG/DL (ref 0.7–1.3)
EOSINOPHIL # BLD AUTO: 0.01 X10^3/UL (ref 0–0.4)
EOSINOPHIL NFR BLD AUTO: 0 % (ref 1–7)
ERYTHROCYTE [DISTWIDTH] IN BLOOD BY AUTOMATED COUNT: 19.3 % (ref 9.4–14.8)
LYMPHOCYTES # BLD AUTO: 0.93 X10^3/UL (ref 1–3.4)
LYMPHOCYTES NFR BLD AUTO: 11 % (ref 22–44)
MCH RBC QN AUTO: 31.5 PG (ref 27.5–34.5)
MCHC RBC AUTO-ENTMCNC: 34.3 G/DL (ref 33.2–36.2)
MCV RBC AUTO: 91.8 FL (ref 81–97)
MD: NO
MONOCYTES # BLD AUTO: 1.39 X10^3/UL (ref 0.2–0.8)
MONOCYTES NFR BLD AUTO: 16 % (ref 2–9)
NEUTROPHILS # BLD AUTO: 6.53 X10^3/UL (ref 1.8–6.8)
NEUTROPHILS NFR BLD AUTO: 74 % (ref 42–75)
PLATELET # BLD AUTO: 141 X10^3/UL (ref 130–400)
PMV BLD AUTO: 8.8 FL (ref 7.4–10.4)
PROT SERPL-MCNC: 8.5 G/DL (ref 6.4–8.2)
RBC # BLD AUTO: 4 X10^6/UL (ref 4.38–5.82)

## 2018-11-20 PROCEDURE — 82533 TOTAL CORTISOL: CPT

## 2018-11-20 PROCEDURE — 84132 ASSAY OF SERUM POTASSIUM: CPT

## 2018-11-20 PROCEDURE — 82436 ASSAY OF URINE CHLORIDE: CPT

## 2018-11-20 PROCEDURE — 70450 CT HEAD/BRAIN W/O DYE: CPT

## 2018-11-20 PROCEDURE — 99291 CRITICAL CARE FIRST HOUR: CPT

## 2018-11-20 PROCEDURE — 87205 SMEAR GRAM STAIN: CPT

## 2018-11-20 PROCEDURE — 84443 ASSAY THYROID STIM HORMONE: CPT

## 2018-11-20 PROCEDURE — 87081 CULTURE SCREEN ONLY: CPT

## 2018-11-20 PROCEDURE — 81001 URINALYSIS AUTO W/SCOPE: CPT

## 2018-11-20 PROCEDURE — 36415 COLL VENOUS BLD VENIPUNCTURE: CPT

## 2018-11-20 PROCEDURE — 87086 URINE CULTURE/COLONY COUNT: CPT

## 2018-11-20 PROCEDURE — 93005 ELECTROCARDIOGRAM TRACING: CPT

## 2018-11-20 PROCEDURE — 84300 ASSAY OF URINE SODIUM: CPT

## 2018-11-20 PROCEDURE — 76700 US EXAM ABDOM COMPLETE: CPT

## 2018-11-20 PROCEDURE — 80053 COMPREHEN METABOLIC PANEL: CPT

## 2018-11-20 PROCEDURE — 85025 COMPLETE CBC W/AUTO DIFF WBC: CPT

## 2018-11-20 PROCEDURE — 80048 BASIC METABOLIC PNL TOTAL CA: CPT

## 2018-11-20 PROCEDURE — 83935 ASSAY OF URINE OSMOLALITY: CPT

## 2018-11-20 PROCEDURE — 84100 ASSAY OF PHOSPHORUS: CPT

## 2018-11-20 PROCEDURE — 82570 ASSAY OF URINE CREATININE: CPT

## 2018-11-20 PROCEDURE — 83735 ASSAY OF MAGNESIUM: CPT

## 2018-11-20 PROCEDURE — C9113 INJ PANTOPRAZOLE SODIUM, VIA: HCPCS

## 2018-11-20 PROCEDURE — 71045 X-RAY EXAM CHEST 1 VIEW: CPT

## 2018-11-20 PROCEDURE — 80307 DRUG TEST PRSMV CHEM ANLYZR: CPT

## 2018-11-20 PROCEDURE — 82140 ASSAY OF AMMONIA: CPT

## 2018-11-20 PROCEDURE — 84133 ASSAY OF URINE POTASSIUM: CPT

## 2018-11-20 PROCEDURE — 85610 PROTHROMBIN TIME: CPT

## 2018-11-21 VITALS — DIASTOLIC BLOOD PRESSURE: 70 MMHG | SYSTOLIC BLOOD PRESSURE: 114 MMHG

## 2018-11-21 VITALS — SYSTOLIC BLOOD PRESSURE: 114 MMHG | DIASTOLIC BLOOD PRESSURE: 70 MMHG

## 2018-11-21 LAB
<PLATELET ESTIMATE>: (no result)
ALBUMIN SERPL-MCNC: 3.5 G/DL (ref 3.4–5)
ALP SERPL-CCNC: 67 U/L (ref 45–117)
ALT SERPL-CCNC: 141 U/L (ref 12–78)
ANION GAP SERPL CALC-SCNC: 15 MMOL/L (ref 5–15)
ANION GAP SERPL CALC-SCNC: 9 MMOL/L (ref 5–15)
ANION GAP SERPL CALC-SCNC: 9 MMOL/L (ref 5–15)
ANISOCYTOSIS BLD QL SMEAR: (no result)
BASOPHILS # BLD AUTO: 0.02 X10^3/UL (ref 0–0.1)
BASOPHILS NFR BLD AUTO: 0 % (ref 0–1)
BILIRUB SERPL-MCNC: 2.7 MG/DL (ref 0.2–1)
CALCIUM SERPL-MCNC: 8.4 MG/DL (ref 8.5–10.1)
CALCIUM SERPL-MCNC: 8.5 MG/DL (ref 8.5–10.1)
CALCIUM SERPL-MCNC: 8.5 MG/DL (ref 8.5–10.1)
CHLORIDE SERPL-SCNC: 76 MMOL/L (ref 98–107)
CHLORIDE SERPL-SCNC: 83 MMOL/L (ref 98–107)
CHLORIDE SERPL-SCNC: 88 MMOL/L (ref 98–107)
CHLORIDE,URINE RANDOM: 35 MMOL/L
CREAT SERPL-MCNC: 0.77 MG/DL (ref 0.7–1.3)
CREAT SERPL-MCNC: 0.84 MG/DL (ref 0.7–1.3)
CREAT SERPL-MCNC: 0.91 MG/DL (ref 0.7–1.3)
CULTURE INDICATED?: YES
EOSINOPHIL # BLD AUTO: 0.03 X10^3/UL (ref 0–0.4)
EOSINOPHIL NFR BLD AUTO: 1 % (ref 1–7)
ERYTHROCYTE [DISTWIDTH] IN BLOOD BY AUTOMATED COUNT: 19.1 % (ref 9.4–14.8)
INR PPP: 1.06 (ref 0.93–1.1)
LG PLATELETS BLD QL SMEAR: (no result)
LYMPHOCYTES # BLD AUTO: 0.96 X10^3/UL (ref 1–3.4)
LYMPHOCYTES NFR BLD AUTO: 17 % (ref 22–44)
MCH RBC QN AUTO: 31.3 PG (ref 27.5–34.5)
MCHC RBC AUTO-ENTMCNC: 34.1 G/DL (ref 33.2–36.2)
MCV RBC AUTO: 91.6 FL (ref 81–97)
MD: (no result)
MICROSCOPIC: (no result)
MONOCYTES # BLD AUTO: 1.04 X10^3/UL (ref 0.2–0.8)
MONOCYTES NFR BLD AUTO: 18 % (ref 2–9)
NEUTROPHILS # BLD AUTO: 3.62 X10^3/UL (ref 1.8–6.8)
NEUTROPHILS NFR BLD AUTO: 64 % (ref 42–75)
OSMOLALITY,URINE: 419 MOSM/KG (ref 500–850)
OVALOCYTES BLD QL SMEAR: (no result)
PLATELET # BLD AUTO: 98 X10^3/UL (ref 130–400)
PMV BLD AUTO: 9.2 FL (ref 7.4–10.4)
POLYCHROMASIA BLD QL SMEAR: (no result)
POTASSIUM UR-SCNC: 41 MMOL/L
PROT SERPL-MCNC: 7.2 G/DL (ref 6.4–8.2)
PROTHROMBIN TIME: 11.2 SECONDS (ref 9.6–11.5)
RBC # BLD AUTO: 3.41 X10^6/UL (ref 4.38–5.82)
SODIUM,URINE RANDOM: 43 MMOL/L
STOMATOCYTES BLD QL SMEAR: (no result)

## 2018-11-21 RX ADMIN — Medication SCH NOTE: at 19:00

## 2018-11-21 RX ADMIN — LORAZEPAM PRN MG: 2 INJECTION INTRAMUSCULAR; INTRAVENOUS at 14:35

## 2018-11-21 RX ADMIN — LORAZEPAM PRN MG: 2 INJECTION INTRAMUSCULAR; INTRAVENOUS at 12:40

## 2018-11-21 RX ADMIN — POTASSIUM CHLORIDE SCH MEQ: 20 TABLET, EXTENDED RELEASE ORAL at 05:30

## 2018-11-21 RX ADMIN — POTASSIUM CHLORIDE SCH MEQ: 20 TABLET, EXTENDED RELEASE ORAL at 01:35

## 2018-11-21 RX ADMIN — LORAZEPAM PRN MG: 2 INJECTION INTRAMUSCULAR; INTRAVENOUS at 09:45

## 2018-11-21 RX ADMIN — PANTOPRAZOLE SODIUM SCH MG: 40 INJECTION, POWDER, FOR SOLUTION INTRAVENOUS at 11:47

## 2018-11-21 RX ADMIN — NICOTINE SCH PATCH: 14 PATCH, EXTENDED RELEASE TRANSDERMAL at 01:35

## 2018-11-21 RX ADMIN — LORAZEPAM PRN MG: 2 INJECTION INTRAMUSCULAR; INTRAVENOUS at 10:37

## 2018-11-21 RX ADMIN — ENOXAPARIN SODIUM SCH MG: 40 INJECTION SUBCUTANEOUS at 05:36

## 2018-11-21 RX ADMIN — THIAMINE HYDROCHLORIDE SCH MLS/HR: 100 INJECTION, SOLUTION INTRAMUSCULAR; INTRAVENOUS at 11:48

## 2018-11-22 VITALS — SYSTOLIC BLOOD PRESSURE: 138 MMHG | DIASTOLIC BLOOD PRESSURE: 84 MMHG

## 2018-11-22 LAB
ALBUMIN SERPL-MCNC: 3.5 G/DL (ref 3.4–5)
ALP SERPL-CCNC: 71 U/L (ref 45–117)
ALT SERPL-CCNC: 200 U/L (ref 12–78)
ANION GAP SERPL CALC-SCNC: 12 MMOL/L (ref 5–15)
BASOPHILS # BLD AUTO: 0.01 X10^3/UL (ref 0–0.1)
BASOPHILS NFR BLD AUTO: 0 % (ref 0–1)
BILIRUB SERPL-MCNC: 2 MG/DL (ref 0.2–1)
CALCIUM SERPL-MCNC: 7.9 MG/DL (ref 8.5–10.1)
CHLORIDE SERPL-SCNC: 91 MMOL/L (ref 98–107)
CREAT SERPL-MCNC: 0.52 MG/DL (ref 0.7–1.3)
EOSINOPHIL # BLD AUTO: 0.08 X10^3/UL (ref 0–0.4)
EOSINOPHIL NFR BLD AUTO: 1 % (ref 1–7)
ERYTHROCYTE [DISTWIDTH] IN BLOOD BY AUTOMATED COUNT: 18.8 % (ref 9.4–14.8)
LYMPHOCYTES # BLD AUTO: 0.96 X10^3/UL (ref 1–3.4)
LYMPHOCYTES NFR BLD AUTO: 17 % (ref 22–44)
MCH RBC QN AUTO: 30.4 PG (ref 27.5–34.5)
MCHC RBC AUTO-ENTMCNC: 32.7 G/DL (ref 33.2–36.2)
MCV RBC AUTO: 92.9 FL (ref 81–97)
MD: NO
MONOCYTES # BLD AUTO: 0.65 X10^3/UL (ref 0.2–0.8)
MONOCYTES NFR BLD AUTO: 12 % (ref 2–9)
NEUTROPHILS # BLD AUTO: 3.84 X10^3/UL (ref 1.8–6.8)
NEUTROPHILS NFR BLD AUTO: 69 % (ref 42–75)
PLATELET # BLD AUTO: 104 X10^3/UL (ref 130–400)
PMV BLD AUTO: 8.9 FL (ref 7.4–10.4)
PROT SERPL-MCNC: 7.2 G/DL (ref 6.4–8.2)
RBC # BLD AUTO: 3.77 X10^6/UL (ref 4.38–5.82)

## 2018-11-22 RX ADMIN — PANTOPRAZOLE SODIUM SCH MG: 40 INJECTION, POWDER, FOR SOLUTION INTRAVENOUS at 07:49

## 2018-11-22 RX ADMIN — PHENOBARBITAL SODIUM SCH MG: 65 INJECTION INTRAMUSCULAR; INTRAVENOUS at 14:08

## 2018-11-22 RX ADMIN — THIAMINE HYDROCHLORIDE SCH MLS/HR: 100 INJECTION, SOLUTION INTRAMUSCULAR; INTRAVENOUS at 12:08

## 2018-11-22 RX ADMIN — Medication SCH NOTE: at 06:43

## 2018-11-22 RX ADMIN — SODIUM CHLORIDE, PRESERVATIVE FREE SCH ML: 5 INJECTION INTRAVENOUS at 07:49

## 2018-11-22 RX ADMIN — PHENOBARBITAL SODIUM SCH MG: 65 INJECTION INTRAMUSCULAR; INTRAVENOUS at 00:55

## 2018-11-22 RX ADMIN — DEXTROSE AND SODIUM CHLORIDE SCH MLS/HR: 5; 900 INJECTION, SOLUTION INTRAVENOUS at 06:43

## 2018-11-22 RX ADMIN — NICOTINE SCH PATCH: 14 PATCH, EXTENDED RELEASE TRANSDERMAL at 00:57

## 2018-11-22 RX ADMIN — Medication SCH NOTE: at 01:00

## 2018-11-22 RX ADMIN — ENOXAPARIN SODIUM SCH MG: 40 INJECTION SUBCUTANEOUS at 05:49

## 2018-11-22 RX ADMIN — SODIUM CHLORIDE, PRESERVATIVE FREE SCH ML: 5 INJECTION INTRAVENOUS at 21:00

## 2018-11-23 VITALS — DIASTOLIC BLOOD PRESSURE: 86 MMHG | SYSTOLIC BLOOD PRESSURE: 133 MMHG

## 2018-11-23 VITALS — SYSTOLIC BLOOD PRESSURE: 124 MMHG | DIASTOLIC BLOOD PRESSURE: 78 MMHG

## 2018-11-23 VITALS — DIASTOLIC BLOOD PRESSURE: 76 MMHG | SYSTOLIC BLOOD PRESSURE: 127 MMHG

## 2018-11-23 VITALS — DIASTOLIC BLOOD PRESSURE: 74 MMHG | SYSTOLIC BLOOD PRESSURE: 118 MMHG

## 2018-11-23 LAB
ALBUMIN SERPL-MCNC: 2.7 G/DL (ref 3.4–5)
ALP SERPL-CCNC: 73 U/L (ref 45–117)
ALT SERPL-CCNC: 174 U/L (ref 12–78)
ANION GAP SERPL CALC-SCNC: 9 MMOL/L (ref 5–15)
ANION GAP SERPL CALC-SCNC: 9 MMOL/L (ref 5–15)
BASOPHILS # BLD AUTO: 0.04 X10^3/UL (ref 0–0.1)
BASOPHILS NFR BLD AUTO: 0 % (ref 0–1)
BILIRUB SERPL-MCNC: 1.5 MG/DL (ref 0.2–1)
CALCIUM SERPL-MCNC: 7.2 MG/DL (ref 8.5–10.1)
CALCIUM SERPL-MCNC: 7.5 MG/DL (ref 8.5–10.1)
CHLORIDE SERPL-SCNC: 90 MMOL/L (ref 98–107)
CHLORIDE SERPL-SCNC: 97 MMOL/L (ref 98–107)
CREAT SERPL-MCNC: 0.55 MG/DL (ref 0.7–1.3)
CREAT SERPL-MCNC: 0.69 MG/DL (ref 0.7–1.3)
EOSINOPHIL # BLD AUTO: 0.05 X10^3/UL (ref 0–0.4)
EOSINOPHIL NFR BLD AUTO: 0 % (ref 1–7)
ERYTHROCYTE [DISTWIDTH] IN BLOOD BY AUTOMATED COUNT: 19.1 % (ref 9.4–14.8)
LYMPHOCYTES # BLD AUTO: 1.04 X10^3/UL (ref 1–3.4)
LYMPHOCYTES NFR BLD AUTO: 9 % (ref 22–44)
MCH RBC QN AUTO: 31.3 PG (ref 27.5–34.5)
MCHC RBC AUTO-ENTMCNC: 34.2 G/DL (ref 33.2–36.2)
MCV RBC AUTO: 91.6 FL (ref 81–97)
MD: (no result)
MONOCYTES # BLD AUTO: 1.81 X10^3/UL (ref 0.2–0.8)
MONOCYTES NFR BLD AUTO: 15 % (ref 2–9)
NEUTROPHILS # BLD AUTO: 9.3 X10^3/UL (ref 1.8–6.8)
NEUTROPHILS NFR BLD AUTO: 76 % (ref 42–75)
PLATELET # BLD AUTO: 116 X10^3/UL (ref 130–400)
PMV BLD AUTO: 8.8 FL (ref 7.4–10.4)
PROT SERPL-MCNC: 6.1 G/DL (ref 6.4–8.2)
RBC # BLD AUTO: 3.21 X10^6/UL (ref 4.38–5.82)

## 2018-11-23 RX ADMIN — PHENOBARBITAL SODIUM SCH MG: 65 INJECTION INTRAMUSCULAR; INTRAVENOUS at 17:32

## 2018-11-23 RX ADMIN — POTASSIUM CHLORIDE SCH MEQ: 20 TABLET, EXTENDED RELEASE ORAL at 09:03

## 2018-11-23 RX ADMIN — NICOTINE SCH PATCH: 14 PATCH, EXTENDED RELEASE TRANSDERMAL at 22:09

## 2018-11-23 RX ADMIN — DEXTROSE AND SODIUM CHLORIDE SCH MLS/HR: 5; 900 INJECTION, SOLUTION INTRAVENOUS at 01:23

## 2018-11-23 RX ADMIN — THIAMINE HYDROCHLORIDE SCH MLS/HR: 100 INJECTION, SOLUTION INTRAMUSCULAR; INTRAVENOUS at 12:24

## 2018-11-23 RX ADMIN — SODIUM CHLORIDE, PRESERVATIVE FREE SCH ML: 5 INJECTION INTRAVENOUS at 09:00

## 2018-11-23 RX ADMIN — PANTOPRAZOLE SODIUM SCH MG: 40 INJECTION, POWDER, FOR SOLUTION INTRAVENOUS at 09:00

## 2018-11-23 RX ADMIN — ENOXAPARIN SODIUM SCH MG: 40 INJECTION SUBCUTANEOUS at 05:39

## 2018-11-23 RX ADMIN — SODIUM CHLORIDE, PRESERVATIVE FREE SCH ML: 5 INJECTION INTRAVENOUS at 21:00

## 2018-11-23 RX ADMIN — PHENOBARBITAL SODIUM SCH MG: 65 INJECTION INTRAMUSCULAR; INTRAVENOUS at 01:23

## 2018-11-23 RX ADMIN — POTASSIUM CHLORIDE SCH MEQ: 20 TABLET, EXTENDED RELEASE ORAL at 17:00

## 2018-11-23 RX ADMIN — NICOTINE SCH PATCH: 14 PATCH, EXTENDED RELEASE TRANSDERMAL at 00:57

## 2018-11-24 VITALS — SYSTOLIC BLOOD PRESSURE: 108 MMHG | DIASTOLIC BLOOD PRESSURE: 64 MMHG

## 2018-11-24 VITALS — DIASTOLIC BLOOD PRESSURE: 85 MMHG | SYSTOLIC BLOOD PRESSURE: 136 MMHG

## 2018-11-24 VITALS — DIASTOLIC BLOOD PRESSURE: 83 MMHG | SYSTOLIC BLOOD PRESSURE: 136 MMHG

## 2018-11-24 VITALS — SYSTOLIC BLOOD PRESSURE: 117 MMHG | DIASTOLIC BLOOD PRESSURE: 79 MMHG

## 2018-11-24 LAB
ALBUMIN SERPL-MCNC: 2.8 G/DL (ref 3.4–5)
ALP SERPL-CCNC: 78 U/L (ref 45–117)
ALT SERPL-CCNC: 157 U/L (ref 12–78)
ANION GAP SERPL CALC-SCNC: 7 MMOL/L (ref 5–15)
BASOPHILS # BLD AUTO: 0.07 X10^3/UL (ref 0–0.1)
BASOPHILS NFR BLD AUTO: 1 % (ref 0–1)
BILIRUB SERPL-MCNC: 1 MG/DL (ref 0.2–1)
CALCIUM SERPL-MCNC: 7.7 MG/DL (ref 8.5–10.1)
CHLORIDE SERPL-SCNC: 101 MMOL/L (ref 98–107)
CREAT SERPL-MCNC: 0.46 MG/DL (ref 0.7–1.3)
EOSINOPHIL # BLD AUTO: 0.09 X10^3/UL (ref 0–0.4)
EOSINOPHIL NFR BLD AUTO: 1 % (ref 1–7)
ERYTHROCYTE [DISTWIDTH] IN BLOOD BY AUTOMATED COUNT: 19.5 % (ref 9.4–14.8)
LYMPHOCYTES # BLD AUTO: 1.12 X10^3/UL (ref 1–3.4)
LYMPHOCYTES NFR BLD AUTO: 16 % (ref 22–44)
MCH RBC QN AUTO: 30.5 PG (ref 27.5–34.5)
MCHC RBC AUTO-ENTMCNC: 33.2 G/DL (ref 33.2–36.2)
MCV RBC AUTO: 92.1 FL (ref 81–97)
MD: NO
MONOCYTES # BLD AUTO: 1.38 X10^3/UL (ref 0.2–0.8)
MONOCYTES NFR BLD AUTO: 19 % (ref 2–9)
NEUTROPHILS # BLD AUTO: 4.56 X10^3/UL (ref 1.8–6.8)
NEUTROPHILS NFR BLD AUTO: 63 % (ref 42–75)
PLATELET # BLD AUTO: 156 X10^3/UL (ref 130–400)
PMV BLD AUTO: 9 FL (ref 7.4–10.4)
PROT SERPL-MCNC: 6.6 G/DL (ref 6.4–8.2)
RBC # BLD AUTO: 3.14 X10^6/UL (ref 4.38–5.82)

## 2018-11-24 RX ADMIN — THIAMINE HYDROCHLORIDE SCH MLS/HR: 100 INJECTION, SOLUTION INTRAMUSCULAR; INTRAVENOUS at 13:26

## 2018-11-24 RX ADMIN — ENOXAPARIN SODIUM SCH MG: 40 INJECTION SUBCUTANEOUS at 05:38

## 2018-11-24 RX ADMIN — NICOTINE SCH PATCH: 14 PATCH, EXTENDED RELEASE TRANSDERMAL at 19:31

## 2018-11-24 RX ADMIN — PANTOPRAZOLE SODIUM SCH MG: 40 INJECTION, POWDER, FOR SOLUTION INTRAVENOUS at 07:36

## 2018-11-24 RX ADMIN — PHENOBARBITAL SODIUM SCH MG: 65 INJECTION INTRAMUSCULAR; INTRAVENOUS at 17:22

## 2018-11-24 RX ADMIN — SODIUM CHLORIDE, PRESERVATIVE FREE SCH ML: 5 INJECTION INTRAVENOUS at 19:32

## 2018-11-24 RX ADMIN — SODIUM CHLORIDE, PRESERVATIVE FREE SCH ML: 5 INJECTION INTRAVENOUS at 07:36

## 2018-11-24 RX ADMIN — PHENOBARBITAL SODIUM SCH MG: 65 INJECTION INTRAMUSCULAR; INTRAVENOUS at 05:38

## 2018-11-25 VITALS — DIASTOLIC BLOOD PRESSURE: 89 MMHG | SYSTOLIC BLOOD PRESSURE: 131 MMHG

## 2018-11-25 VITALS — DIASTOLIC BLOOD PRESSURE: 87 MMHG | SYSTOLIC BLOOD PRESSURE: 123 MMHG

## 2018-11-25 VITALS — DIASTOLIC BLOOD PRESSURE: 77 MMHG | SYSTOLIC BLOOD PRESSURE: 118 MMHG

## 2018-11-25 LAB
ALBUMIN SERPL-MCNC: 3.2 G/DL (ref 3.4–5)
ALP SERPL-CCNC: 83 U/L (ref 45–117)
ALT SERPL-CCNC: 166 U/L (ref 12–78)
ANION GAP SERPL CALC-SCNC: 9 MMOL/L (ref 5–15)
BASOPHILS # BLD AUTO: 0.07 X10^3/UL (ref 0–0.1)
BASOPHILS NFR BLD AUTO: 1 % (ref 0–1)
BILIRUB SERPL-MCNC: 0.6 MG/DL (ref 0.2–1)
CALCIUM SERPL-MCNC: 8.5 MG/DL (ref 8.5–10.1)
CHLORIDE SERPL-SCNC: 97 MMOL/L (ref 98–107)
CREAT SERPL-MCNC: 0.59 MG/DL (ref 0.7–1.3)
EOSINOPHIL # BLD AUTO: 0.07 X10^3/UL (ref 0–0.4)
EOSINOPHIL NFR BLD AUTO: 1 % (ref 1–7)
ERYTHROCYTE [DISTWIDTH] IN BLOOD BY AUTOMATED COUNT: 19.6 % (ref 9.4–14.8)
LYMPHOCYTES # BLD AUTO: 1.29 X10^3/UL (ref 1–3.4)
LYMPHOCYTES NFR BLD AUTO: 19 % (ref 22–44)
MCH RBC QN AUTO: 31.5 PG (ref 27.5–34.5)
MCHC RBC AUTO-ENTMCNC: 33.8 G/DL (ref 33.2–36.2)
MCV RBC AUTO: 93.3 FL (ref 81–97)
MD: NO
MONOCYTES # BLD AUTO: 1.31 X10^3/UL (ref 0.2–0.8)
MONOCYTES NFR BLD AUTO: 19 % (ref 2–9)
NEUTROPHILS # BLD AUTO: 4.02 X10^3/UL (ref 1.8–6.8)
NEUTROPHILS NFR BLD AUTO: 59 % (ref 42–75)
PLATELET # BLD AUTO: 222 X10^3/UL (ref 130–400)
PMV BLD AUTO: 8.7 FL (ref 7.4–10.4)
PROT SERPL-MCNC: 7.2 G/DL (ref 6.4–8.2)
RBC # BLD AUTO: 3.4 X10^6/UL (ref 4.38–5.82)

## 2018-11-25 RX ADMIN — PANTOPRAZOLE SODIUM SCH MG: 40 INJECTION, POWDER, FOR SOLUTION INTRAVENOUS at 08:01

## 2018-11-25 RX ADMIN — THIAMINE HYDROCHLORIDE SCH MLS/HR: 100 INJECTION, SOLUTION INTRAMUSCULAR; INTRAVENOUS at 13:00

## 2018-11-25 RX ADMIN — ENOXAPARIN SODIUM SCH MG: 40 INJECTION SUBCUTANEOUS at 05:23

## 2018-11-25 RX ADMIN — PHENOBARBITAL SODIUM SCH MG: 65 INJECTION INTRAMUSCULAR; INTRAVENOUS at 05:23

## 2018-11-25 RX ADMIN — SODIUM CHLORIDE, PRESERVATIVE FREE SCH ML: 5 INJECTION INTRAVENOUS at 08:02

## 2019-02-17 ENCOUNTER — HOSPITAL ENCOUNTER (EMERGENCY)
Dept: HOSPITAL 8 - ED | Age: 54
Discharge: HOME | End: 2019-02-17
Payer: MEDICAID

## 2019-02-17 VITALS — SYSTOLIC BLOOD PRESSURE: 135 MMHG | DIASTOLIC BLOOD PRESSURE: 86 MMHG

## 2019-02-17 DIAGNOSIS — R21: Primary | ICD-10-CM

## 2019-02-17 PROCEDURE — 99283 EMERGENCY DEPT VISIT LOW MDM: CPT

## 2019-03-05 ENCOUNTER — HOSPITAL ENCOUNTER (EMERGENCY)
Dept: HOSPITAL 8 - ED | Age: 54
Discharge: HOME | End: 2019-03-05
Payer: MEDICAID

## 2019-03-05 VITALS — DIASTOLIC BLOOD PRESSURE: 75 MMHG | SYSTOLIC BLOOD PRESSURE: 115 MMHG

## 2019-03-05 VITALS — BODY MASS INDEX: 19.38 KG/M2 | WEIGHT: 123.46 LBS | HEIGHT: 67 IN

## 2019-03-05 DIAGNOSIS — B86: Primary | ICD-10-CM

## 2019-03-05 PROCEDURE — 99283 EMERGENCY DEPT VISIT LOW MDM: CPT

## 2019-03-11 ENCOUNTER — HOSPITAL ENCOUNTER (EMERGENCY)
Dept: HOSPITAL 8 - ED | Age: 54
Discharge: HOME | End: 2019-03-11
Payer: MEDICAID

## 2019-03-11 VITALS — BODY MASS INDEX: 19 KG/M2 | HEIGHT: 69 IN | WEIGHT: 128.31 LBS

## 2019-03-11 VITALS — DIASTOLIC BLOOD PRESSURE: 93 MMHG | SYSTOLIC BLOOD PRESSURE: 155 MMHG

## 2019-03-11 DIAGNOSIS — L03.113: Primary | ICD-10-CM

## 2019-03-11 DIAGNOSIS — L02.413: ICD-10-CM

## 2019-03-11 DIAGNOSIS — F17.200: ICD-10-CM

## 2019-03-11 PROCEDURE — 99283 EMERGENCY DEPT VISIT LOW MDM: CPT

## 2019-03-11 NOTE — NUR
Discharge instructions discussed with patient including when to return to 
emergency department, verbalizes understanding. Prescription provided with 
instruction for use.

## 2019-07-14 ENCOUNTER — HOSPITAL ENCOUNTER (EMERGENCY)
Dept: HOSPITAL 8 - ED | Age: 54
Discharge: HOME | End: 2019-07-14
Payer: MEDICAID

## 2019-07-14 VITALS — BODY MASS INDEX: 17.53 KG/M2 | HEIGHT: 69 IN | WEIGHT: 118.39 LBS

## 2019-07-14 VITALS — DIASTOLIC BLOOD PRESSURE: 91 MMHG | SYSTOLIC BLOOD PRESSURE: 138 MMHG
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
## 2019-07-14 DIAGNOSIS — S06.0X9A: Primary | ICD-10-CM
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
## 2019-07-14 PROCEDURE — 99281 EMR DPT VST MAYX REQ PHY/QHP: CPT

## 2020-04-04 ENCOUNTER — HOSPITAL ENCOUNTER (EMERGENCY)
Dept: HOSPITAL 8 - ED | Age: 55
Discharge: HOME | End: 2020-04-04
Payer: MEDICAID

## 2020-04-04 VITALS — HEIGHT: 70 IN | WEIGHT: 126.55 LBS | BODY MASS INDEX: 18.12 KG/M2

## 2020-04-04 VITALS — DIASTOLIC BLOOD PRESSURE: 88 MMHG | SYSTOLIC BLOOD PRESSURE: 119 MMHG

## 2020-04-04 DIAGNOSIS — F10.10: ICD-10-CM

## 2020-04-04 DIAGNOSIS — F17.200: ICD-10-CM

## 2020-04-04 DIAGNOSIS — F41.9: ICD-10-CM

## 2020-04-04 DIAGNOSIS — R00.0: Primary | ICD-10-CM
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
## 2020-04-04 PROCEDURE — 99281 EMR DPT VST MAYX REQ PHY/QHP: CPT

## 2021-07-27 ENCOUNTER — HOSPITAL ENCOUNTER (EMERGENCY)
Facility: MEDICAL CENTER | Age: 56
End: 2021-07-27
Payer: COMMERCIAL

## 2021-07-27 VITALS
OXYGEN SATURATION: 94 % | HEIGHT: 70 IN | TEMPERATURE: 97.2 F | BODY MASS INDEX: 19.33 KG/M2 | DIASTOLIC BLOOD PRESSURE: 79 MMHG | SYSTOLIC BLOOD PRESSURE: 112 MMHG | HEART RATE: 84 BPM | WEIGHT: 135 LBS | RESPIRATION RATE: 14 BRPM

## 2021-07-27 PROCEDURE — 302449 STATCHG TRIAGE ONLY (STATISTIC)

## 2021-07-27 NOTE — ED TRIAGE NOTES
55 y/o male bib ambulance after a concerned citizen called about the patient laying in the park after he had vomited on himself. Pt states he has had at least 1 pint to drink today, he denies complaints at this time. Pt placed in wheelchair at check-in, asked pt to remain in the wheelchair during his wait in the ED lobby.

## 2021-10-23 ENCOUNTER — HOSPITAL ENCOUNTER (EMERGENCY)
Facility: MEDICAL CENTER | Age: 56
End: 2021-10-23
Attending: EMERGENCY MEDICINE | Admitting: EMERGENCY MEDICINE
Payer: COMMERCIAL

## 2021-10-23 VITALS
WEIGHT: 121.69 LBS | HEIGHT: 69 IN | HEART RATE: 95 BPM | OXYGEN SATURATION: 97 % | TEMPERATURE: 98 F | SYSTOLIC BLOOD PRESSURE: 175 MMHG | RESPIRATION RATE: 17 BRPM | BODY MASS INDEX: 18.02 KG/M2 | DIASTOLIC BLOOD PRESSURE: 100 MMHG

## 2021-10-23 DIAGNOSIS — S01.511A LIP LACERATION, INITIAL ENCOUNTER: ICD-10-CM

## 2021-10-23 LAB
ALBUMIN SERPL BCP-MCNC: 4.7 G/DL (ref 3.2–4.9)
ALBUMIN/GLOB SERPL: 1.6 G/DL
ALP SERPL-CCNC: 66 U/L (ref 30–99)
ALT SERPL-CCNC: 41 U/L (ref 2–50)
ANION GAP SERPL CALC-SCNC: 16 MMOL/L (ref 7–16)
AST SERPL-CCNC: 72 U/L (ref 12–45)
BASOPHILS # BLD AUTO: 0.5 % (ref 0–1.8)
BASOPHILS # BLD: 0.03 K/UL (ref 0–0.12)
BILIRUB SERPL-MCNC: 0.9 MG/DL (ref 0.1–1.5)
BUN SERPL-MCNC: 7 MG/DL (ref 8–22)
CALCIUM SERPL-MCNC: 8.9 MG/DL (ref 8.5–10.5)
CHLORIDE SERPL-SCNC: 103 MMOL/L (ref 96–112)
CO2 SERPL-SCNC: 24 MMOL/L (ref 20–33)
CREAT SERPL-MCNC: 0.55 MG/DL (ref 0.5–1.4)
EOSINOPHIL # BLD AUTO: 0.01 K/UL (ref 0–0.51)
EOSINOPHIL NFR BLD: 0.2 % (ref 0–6.9)
ERYTHROCYTE [DISTWIDTH] IN BLOOD BY AUTOMATED COUNT: 49.1 FL (ref 35.9–50)
GLOBULIN SER CALC-MCNC: 3 G/DL (ref 1.9–3.5)
GLUCOSE SERPL-MCNC: 101 MG/DL (ref 65–99)
HCT VFR BLD AUTO: 41.2 % (ref 42–52)
HGB BLD-MCNC: 14.8 G/DL (ref 14–18)
IMM GRANULOCYTES # BLD AUTO: 0.03 K/UL (ref 0–0.11)
IMM GRANULOCYTES NFR BLD AUTO: 0.5 % (ref 0–0.9)
LYMPHOCYTES # BLD AUTO: 1.11 K/UL (ref 1–4.8)
LYMPHOCYTES NFR BLD: 19.1 % (ref 22–41)
MCH RBC QN AUTO: 37.1 PG (ref 27–33)
MCHC RBC AUTO-ENTMCNC: 35.9 G/DL (ref 33.7–35.3)
MCV RBC AUTO: 103.3 FL (ref 81.4–97.8)
MONOCYTES # BLD AUTO: 0.55 K/UL (ref 0–0.85)
MONOCYTES NFR BLD AUTO: 9.5 % (ref 0–13.4)
NEUTROPHILS # BLD AUTO: 4.09 K/UL (ref 1.82–7.42)
NEUTROPHILS NFR BLD: 70.2 % (ref 44–72)
NRBC # BLD AUTO: 0 K/UL
NRBC BLD-RTO: 0 /100 WBC
PLATELET # BLD AUTO: 150 K/UL (ref 164–446)
PMV BLD AUTO: 10.2 FL (ref 9–12.9)
POTASSIUM SERPL-SCNC: 3.8 MMOL/L (ref 3.6–5.5)
PROT SERPL-MCNC: 7.7 G/DL (ref 6–8.2)
RBC # BLD AUTO: 3.99 M/UL (ref 4.7–6.1)
SODIUM SERPL-SCNC: 143 MMOL/L (ref 135–145)
WBC # BLD AUTO: 5.8 K/UL (ref 4.8–10.8)

## 2021-10-23 PROCEDURE — 80053 COMPREHEN METABOLIC PANEL: CPT

## 2021-10-23 PROCEDURE — 85025 COMPLETE CBC W/AUTO DIFF WBC: CPT

## 2021-10-23 PROCEDURE — 99283 EMERGENCY DEPT VISIT LOW MDM: CPT

## 2021-10-23 NOTE — ED NOTES
Patient ambulatory back to room for triaged complaint. Bleeding controlled. No trauma or swelling noted. Small 1-2mm laceration noted to lower lip.

## 2021-10-23 NOTE — ED TRIAGE NOTES
"Chief Complaint   Patient presents with   • Lip Laceration     Pt reports that yearly his lips get dry and bleed. Pt states that today his lip bled for 4 hours. Pt states it's hard for him to eat or talk, dried blood on the front of patient's clothing. Pt states, \"I just want a doctor to look at it and make sure I'm not anemic or something.\"     BP (!) 162/104   Pulse (!) 117   Temp 36.9 °C (98.5 °F) (Temporal)   Resp 16   Ht 1.753 m (5' 9\")   Wt 55.2 kg (121 lb 11.1 oz)   SpO2 96%   BMI 17.97 kg/m²     Pt is ambulatory in and out of triage. Appropriate PPE worn throughout entire encounter. Pt placed back in the lobby and educated about triage process.    "

## 2021-10-24 NOTE — ED PROVIDER NOTES
"ED Provider Note      Means of Arrival: Private Vehicle  History obtained from: Patient    CHIEF COMPLAINT  Chief Complaint   Patient presents with   • Lip Laceration     Pt reports that yearly his lips get dry and bleed. Pt states that today his lip bled for 4 hours. Pt states it's hard for him to eat or talk, dried blood on the front of patient's clothing. Pt states, \"I just want a doctor to look at it and make sure I'm not anemic or something.\"       HPI  John Aguirre is a 56 y.o. male who presents with excessive bleeding from lip laceration.  He reports that every year in the beginning of winter he gets a crack in his lip.  Today it bled continuously for approximately 3 hours covering his shirt.  He is concerned about blood loss from this.  He reports that using balm's are not helpful for him.  He denies any trauma.    REVIEW OF SYSTEMS  CONSTITUTIONAL:  No fever.  CARDIOVASCULAR:  No chest discomfort.  RESPIRATORY:  No pleuritic chest pain.  GASTROINTESTINAL:  No abdominal pain.  GENITOURINARY:   No dysuria.    See HPI for further details.   All other systems are negative.     PAST MEDICAL HISTORY  Past Medical History:   Diagnosis Date   • Dental disorder     poor dentations  broken teeth   • Pain    • Renal failure 2010    Resolved, due to dehydration       FAMILY HISTORY  No family history on file.    SOCIAL HISTORY   reports that he has been smoking cigarettes. He has a 10.00 pack-year smoking history. He has never used smokeless tobacco. He reports current alcohol use. He reports current drug use.    SURGICAL HISTORY  Past Surgical History:   Procedure Laterality Date   • INGUINAL HERNIA REPAIR  3/14/2014    Performed by David Ramirez M.D. at SURGERY SAME DAY [facility] ORS   • RADIUS ULNA ORIF  10/23/2011    Performed by GERMAN REILLY at SURGERY [facility] ORS   • OTHER      broken jaw       CURRENT MEDICATIONS  Home Medications     Reviewed by Ting Nguyễn R.N. (Registered Nurse) " "on 10/23/21 at 1433  Med List Status: Not Addressed   Medication Last Dose Status   folic acid (FOLVITE) 1 MG Tab  Active   multivitamin (THERAGRAN) Tab  Active   nicotine (NICODERM) 21 MG/24HR PATCH 24 HR  Active   omeprazole (PRILOSEC) 20 MG delayed-release capsule  Active   thiamine (THIAMINE) 100 MG tablet  Active                ALLERGIES  No Known Allergies    PHYSICAL EXAM  VITAL SIGNS: BP (!) 162/104   Pulse (!) 117   Temp 36.9 °C (98.5 °F) (Temporal)   Resp 16   Ht 1.753 m (5' 9\")   Wt 55.2 kg (121 lb 11.1 oz)   SpO2 96%   BMI 17.97 kg/m²    Gen: Alert.  Blood splattered over patient's shirt.  HENT: ATNC.  Small crack/laceration in the midline lower lip.  No active bleeding.  Eyes: Normal conjunctiva  Neck: trachea midline  Resp: no respiratory distress  CV: No JVD, RRR  Abd: non-distended  Ext: No deformities  Psych: normal mood  Neuro: speech fluent       LABS  Labs Reviewed   CBC WITH DIFFERENTIAL - Abnormal; Notable for the following components:       Result Value    RBC 3.99 (*)     Hematocrit 41.2 (*)     .3 (*)     MCH 37.1 (*)     MCHC 35.9 (*)     Platelet Count 150 (*)     Lymphocytes 19.10 (*)     All other components within normal limits   COMP METABOLIC PANEL - Abnormal; Notable for the following components:    Glucose 101 (*)     Bun 7 (*)     AST(SGOT) 72 (*)     All other components within normal limits   ESTIMATED GFR          COURSE & MEDICAL DECISION MAKING  Pertinent Labs & Imaging studies reviewed. (See chart for details)    Patient presents with bleeding from his lip wound.  Is not actively bleeding at this time.  He has received lip balm already.  We will give him a small vial of Dermabond in case his bleeding returns and will not stop with direct pressure.  Patient counseled on the use of this and was warned about the potential to glue his lips together.    Patient's labs ordered at triage demonstrates no critical thrombocytopenia or anemia.  Patient is otherwise " well-appearing at the time of discharge.    The patient was given return precautions, anticipatory guidance, and the opportunity to ask questions prior to discharge.     Appropriate PPE were worn at this encounter.     FINAL IMPRESSION  1. Lip laceration, initial encounter           DISPOSITION:  Patient will be discharged home in stable condition.    FOLLOW UP:  [facility], Emergency Dept  [address] [facility] [address] [phone]    If symptoms worsen    To establish a primary care provider within our system, please call [phone]          [address] [address] [phone]        [facility]  [address] [address] [phone]            This dictation was created using voice recognition software. The accuracy of the dictation is limited to the abilities of the software. I expect there may be some errors of grammar and possibly content. The nursing notes were reviewed and certain aspects of this information were incorporated into this note.

## 2021-10-24 NOTE — DISCHARGE INSTRUCTIONS
You are seen emergency department for a lip laceration.  This appears to stop bleeding on its own.  You are being sent home with a glue you can use in case your bleeding returns.  First apply pressure to your lip to stop the bleeding and if this does not work, you can use the skin glue.  Please be very careful that you do not close her mouth while the skin glue is wet as you may glue your lips together.    Please return to the emergency department or seek medical attention if you develop:  Difficulty breathing, chest pain, uncontrollable bleeding, any other new or concerning findings

## 2021-10-24 NOTE — ED NOTES
Discharge teaching and paperwork provided regarding lip laceration and all questions/concerns answered. VSS, assessment stable. Given information regarding home care and reasons to follow up with ED or primary MD. Patient provided dermabond. Patient discharged to the care of himself and ambulated out of the ED.

## 2021-12-03 ENCOUNTER — HOSPITAL ENCOUNTER (EMERGENCY)
Facility: MEDICAL CENTER | Age: 56
End: 2021-12-03
Attending: EMERGENCY MEDICINE
Payer: COMMERCIAL

## 2021-12-03 ENCOUNTER — APPOINTMENT (OUTPATIENT)
Dept: RADIOLOGY | Facility: MEDICAL CENTER | Age: 56
End: 2021-12-03
Attending: EMERGENCY MEDICINE
Payer: COMMERCIAL

## 2021-12-03 VITALS
TEMPERATURE: 97.5 F | SYSTOLIC BLOOD PRESSURE: 147 MMHG | HEIGHT: 69 IN | WEIGHT: 121.69 LBS | BODY MASS INDEX: 18.02 KG/M2 | DIASTOLIC BLOOD PRESSURE: 91 MMHG | RESPIRATION RATE: 18 BRPM | HEART RATE: 87 BPM | OXYGEN SATURATION: 98 %

## 2021-12-03 DIAGNOSIS — S00.03XA HEMATOMA OF SCALP, INITIAL ENCOUNTER: ICD-10-CM
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
## 2021-12-03 DIAGNOSIS — F10.920 ALCOHOLIC INTOXICATION WITHOUT COMPLICATION (HCC): ICD-10-CM

## 2021-12-03 PROCEDURE — 99284 EMERGENCY DEPT VISIT MOD MDM: CPT | Mod: 25

## 2021-12-03 PROCEDURE — 70450 CT HEAD/BRAIN W/O DYE: CPT

## 2021-12-03 ASSESSMENT — FIBROSIS 4 INDEX: FIB4 SCORE: 4.12

## 2021-12-03 NOTE — ED NOTES
Pt's head cleaned. Small abrasion and contusion to Rt side posterior head noted. Pt stated no pain during cleaning process. ERP notified.

## 2021-12-03 NOTE — ED NOTES
Pt provided with discharge instructions. Pt had no further questions. Pt ambulated to [facility]

## 2021-12-03 NOTE — ED PROVIDER NOTES
ED Provider Note    Scribed for Celeste Daniels M.D. by Hannah Barton. 12/3/2021, 10:42 AM.    Primary care provider: Pcp Pt States None  Means of arrival: EMS  History obtained from: Patient  History limited by: None    CHIEF COMPLAINT  Chief Complaint   Patient presents with   • T-5000 Head Injury     BIB EMS for head injury. Pt fall off bunkbed in [facility], contusion and small lac to Rt side back of head. ETOH abuse. Hx of bedbugs. At this time, RN did not see any bedbugs.       HPI  Jhon Aguirre is a 56 y.o. male who presents to the Emergency Department via EMS for evaluation of head injury onset prior to arrival. Patient states he consumed alcohol before falling off of a bunk bed. He admits to associated symptoms of upper extremity lacerations and head pain, but denies fever. No alleviating factors were reported.       REVIEW OF SYSTEMS  Pertinent positives include upper extremity lacerations, head pain, and head injury. Pertinent negatives include no fever.  All other systems reviewed and negative.    PAST MEDICAL HISTORY   has a past medical history of Dental disorder, Pain, and Renal failure (2010).    SURGICAL HISTORY   has a past surgical history that includes other; radius ulna orif (10/23/2011); and inguinal hernia repair (3/14/2014).    SOCIAL HISTORY  Social History     Tobacco Use   • Smoking status: Current Every Day Smoker     Packs/day: 0.50     Years: 20.00     Pack years: 10.00     Types: Cigarettes   • Smokeless tobacco: Never Used   Substance Use Topics   • Alcohol use: Yes     Comment: pint a day   • Drug use: Yes     Comment: weed      Social History     Substance and Sexual Activity   Drug Use Yes    Comment: weed       FAMILY HISTORY  No family history noted    CURRENT MEDICATIONS  Home Medications    **Home medications have not yet been reviewed for this encounter**         ALLERGIES  No Known Allergies    PHYSICAL EXAM  VITAL SIGNS: /73   Pulse 80   Temp 36.6  "°C (97.8 °F) (Temporal)   Ht 1.753 m (5' 9.02\")   Wt 55.2 kg (121 lb 11.1 oz)   SpO2 93%   BMI 17.96 kg/m²   Constitutional: Intoxicated, Alert in no apparent distress.   HENT: Hematoma on right parietal head with overlying laceration, Bilateral external ears normal. Nose normal.   Eyes:  Conjunctiva normal, non-icteric.   Lungs: Non-labored respirations  Skin: Warm, Dry, No erythema, No rash.   Neurologic: Easily arousable, Alert, Grossly non-focal.   Psychiatric: Affect normal, Judgment normal, Mood normal, Appears intoxicated.      LABS  Labs Reviewed - No data to display  All labs reviewed by me.    RADIOLOGY  CT-HEAD W/O   Final Result      1.  Moderate cerebral atrophy beyond expected for the patient's stated age.   2.  Old lacunar infarct left anterior limb internal capsule.   3.  Old lacunar infarct right basal ganglia.   4.  No evidence of acute hemorrhage.   5.  Scalp contusion.           The radiologist's interpretation of all radiological studies have been reviewed by me.    COURSE & MEDICAL DECISION MAKING  Pertinent Labs & Imaging studies reviewed. (See chart for details)      10:42 AM - Patient seen and examined at bedside. I informed patient of plan to obtain labs and images. Ordered CT-Head to evaluate his symptoms.     12:36 PM Patient was reevaluated at bedside. Discussed radiology results with the patient and informed them of no abnormalities.  There is no suturable laceration on the hematoma there is a superficial abrasion.  I discussed plan for discharge and follow up as outlined below. The patient verbalizes they feel comfortable going home. The patient is stable for discharge at this time and will return for any new or worsening symptoms. Patient verbalizes understanding and support with my plan for discharge.        The patient will return for new or worsening symptoms and is stable at the time of discharge. Patient was given return precautions. Patient and/or family member verbalizes " understanding and will comply.    DISPOSITION:  Patient will be discharged home in stable condition.    FOLLOW UP:  [facility], Emergency Dept  [address]  [place] [address] [phone]    Return for worsening pain, vomiting or other concerns      OUTPATIENT MEDICATIONS:  Discharge Medication List as of 12/3/2021 12:41 PM            FINAL IMPRESSION  1. Fall, initial encounter    2. Hematoma of scalp, initial encounter    3. Alcoholic intoxication without complication (HCC)               This dictation has been created using voice recognition software and/or scribes. The accuracy of the dictation is limited by the abilities of the software and the expertise of the scribes. I expect there may be some errors of grammar and possibly content. I made every attempt to manually correct the errors within my dictation. However, errors related to voice recognition software and/or scribes may still exist and should be interpreted within the appropriate context.     Hannah MULTANI (Scribe), am scribing for, and in the presence of, Celeste Daniels M.D..    Electronically signed by: Hannah Barton (Scribtanja), 12/3/2021    ICeleste M.D. personally performed the services described in this documentation, as scribed by Hannah Barton in my presence, and it is both accurate and complete.    The note accurately reflects work and decisions made by me.  Celeste Daniels M.D.  12/3/2021  1:20 PM

## 2021-12-03 NOTE — ED NOTES
Pt pacing about room, verbally aggressive to nursing staff. Pt informed of plan of care and given a meal box. Pt calmed down at RN request and stated he will wait for his results.

## 2021-12-03 NOTE — ED TRIAGE NOTES
Chief Complaint   Patient presents with   • T-5000 Head Injury     BIB EMS for head injury. Pt fall off bunkbed in [facility], contusion and small lac to Rt side back of head. ETOH abuse. Hx of bedbugs. At this time, RN did not see any bedbugs.

## 2022-01-28 ENCOUNTER — HOSPITAL ENCOUNTER (EMERGENCY)
Facility: MEDICAL CENTER | Age: 57
End: 2022-01-28
Payer: COMMERCIAL

## 2022-01-28 VITALS
HEART RATE: 118 BPM | TEMPERATURE: 96.3 F | OXYGEN SATURATION: 93 % | RESPIRATION RATE: 22 BRPM | DIASTOLIC BLOOD PRESSURE: 125 MMHG | SYSTOLIC BLOOD PRESSURE: 208 MMHG

## 2022-01-28 PROCEDURE — 302449 STATCHG TRIAGE ONLY (STATISTIC)

## 2022-01-29 NOTE — ED NOTES
Patient demanding a cab voucher for having to wait in the lobby and not getting a room. Explained that you have to be seen and discharged. Patient got aggressive and started yelling that we don't care. Patient left AMA, refused to sign AMA form.

## 2022-01-29 NOTE — ED TRIAGE NOTES
Patient was walking around and fell, homeless, etoh abuse, currently intoxicated, denies hitting his head but complains of low back pain and knee pain.  Not on any blood thinners.

## 2022-03-12 ENCOUNTER — HOSPITAL ENCOUNTER (EMERGENCY)
Facility: MEDICAL CENTER | Age: 57
End: 2022-03-13
Attending: EMERGENCY MEDICINE | Admitting: EMERGENCY MEDICINE
Payer: COMMERCIAL

## 2022-03-12 DIAGNOSIS — R04.0 EPISTAXIS: ICD-10-CM

## 2022-03-12 PROCEDURE — 99283 EMERGENCY DEPT VISIT LOW MDM: CPT

## 2022-03-12 RX ORDER — OXYMETAZOLINE HYDROCHLORIDE 0.05 G/100ML
2 SPRAY NASAL ONCE
Status: COMPLETED | OUTPATIENT
Start: 2022-03-13 | End: 2022-03-13

## 2022-03-12 ASSESSMENT — FIBROSIS 4 INDEX: FIB4 SCORE: 4.2

## 2022-03-13 VITALS
RESPIRATION RATE: 18 BRPM | OXYGEN SATURATION: 95 % | HEIGHT: 70 IN | BODY MASS INDEX: 18.32 KG/M2 | SYSTOLIC BLOOD PRESSURE: 127 MMHG | DIASTOLIC BLOOD PRESSURE: 84 MMHG | TEMPERATURE: 98.1 F | HEART RATE: 92 BPM | WEIGHT: 128 LBS

## 2022-03-13 PROCEDURE — 700102 HCHG RX REV CODE 250 W/ 637 OVERRIDE(OP): Performed by: EMERGENCY MEDICINE

## 2022-03-13 PROCEDURE — A9270 NON-COVERED ITEM OR SERVICE: HCPCS | Performed by: EMERGENCY MEDICINE

## 2022-03-13 RX ADMIN — OXYMETAZOLINE HCL 2 SPRAY: 0.05 SPRAY NASAL at 00:00

## 2022-03-13 NOTE — ED TRIAGE NOTES
Chief Complaint   Patient presents with   • Epistaxis     Pt states his nose bleed started today, pt states he went to [facility] and had something placed in his nose but it did not help, pt then decided to come to [facility]      Pt ambulatory to triage for above complaint. Pt states had bloody emesis, states he feels dizzy     Pt is alert/oriented and follows commands. Pt speaking in full sentences and responds appropriately to questions. No acute distress noted in triage and respirations are even and unlabored.     Pt placed in lobby and educated on triage process. Pt encouraged to alert staff for any changes in condition.

## 2022-03-13 NOTE — ED PROVIDER NOTES
ED Provider Note    Scribed for Anish Mchugh by Tierra Parker. 3/12/2022  11:39 PM    Primary care provider: Pcp Pt States None  Means of arrival: Walk in  History obtained from: Patient  History limited by: None    CHIEF COMPLAINT  Chief Complaint   Patient presents with   • Epistaxis     Pt states his nose bleed started today, pt states he went to [facility] and had something placed in his nose but it did not help, pt then decided to come to [facility]  Jhon Aguirre is a 57 y.o. male who presents to the Emergency Department for evaluation of intermittent epistaxis onset 2:00 AM today. The patient explains that he began experiencing epistaxis this morning and was evaluated and treated at [place] four times. The bleeding is mainly coming from the left nares and bleeds more when he coughs or sneezes.The patient stated that [place] ED would pack his nose, but he would remove the packing once he is discharged. He has a history of alcohol use disorder and reports drinking 1/2 a beer today. He experiences associated lightheadedness.     Quality: Nasal bleeding  Duration: One day  Severity: Mild  Associated sx: None     REVIEW OF SYSTEMS  As above, all other systems reviewed and are negative.   See [facility] for further details.     PAST MEDICAL HISTORY   has a past medical history of Dental disorder, Pain, and Renal failure (2010).  SURGICAL HISTORY   has a past surgical history that includes other; orif, fracture, radius and ulna (10/23/2011); and inguinal hernia repair (3/14/2014).  SOCIAL HISTORY  Social History     Tobacco Use   • Smoking status: Current Every Day Smoker     Packs/day: 0.50     Years: 20.00     Pack years: 10.00     Types: Cigarettes   • Smokeless tobacco: Never Used   Vaping Use   • Vaping Use: Never used   Substance Use Topics   • Alcohol use: Yes     Comment: once in a while    • Drug use: Yes     Comment: weed      Social History     Substance and Sexual Activity   Drug  "Use Yes    Comment: weed     FAMILY HISTORY  History reviewed. No pertinent family history.  CURRENT MEDICATIONS  Home Medications     Reviewed by Keegan Smalls R.N. (Registered Nurse) on 03/12/22 at 2201  Med List Status: <None>   Medication Last Dose Status   folic acid (FOLVITE) 1 MG Tab  Active   multivitamin (THERAGRAN) Tab  Active   nicotine (NICODERM) 21 MG/24HR PATCH 24 HR  Active   omeprazole (PRILOSEC) 20 MG delayed-release capsule  Active   thiamine (THIAMINE) 100 MG tablet  Active              ALLERGIES  No Known Allergies  PHYSICAL EXAM  VITAL SIGNS:   Vitals:    03/12/22 2157 03/12/22 2305 03/12/22 2307 03/12/22 2328   BP:  125/68     Pulse:  94  86   Resp:  16     Temp:  36.7 °C (98 °F)     TempSrc:  Temporal     SpO2:   93% 92%   Weight: 58.1 kg (128 lb)      Height: 1.778 m (5' 10\")          Vitals: My interpretation: normotensive, not tachycardic, afebrile, not hypoxic    Reinterpretation of vitals: Improved    PE:   Gen: disheveled, sitting comfortably, speaking clearly, appears in no acute distress  ENT: Mucous membranes moist, posterior pharynx clear, uvula midline, nares patent bilaterally. Nasal nares were visualized with otoscope, there was dried blood noticed around the entrance to the nares and bleeding noted in the anterior nose but no active bleeding in th posterior nose.   Neck: Supple, FROM  Pulmonary: Lungs are clear to auscultation bilaterally. No tachypnea  CV:  RRR, no murmur appreciated, pulses 2+ in both upper and lower extremities  Abdomen: soft, NT/ND; no rebound/guarding  : no CVA or suprapubic tenderness   Neuro: A&Ox4 (person, place, time, situation), speech fluent, gait steady, no focal deficits appreciated  Skin: No rash or lesions.  No pallor or jaundice.  No cyanosis.  Warm and dry.       COURSE & MEDICAL DECISION MAKING  Nursing notes, VS, PMSFHx, labs, imaging, EKG reviewed in chart.    MDM: 11:39 PM Jhon Aguirre is a 57 y.o. male who presented with " left nare epistaxis that has been intermittent today.  Is been seen 4 times at [facility].  Had multiple nasal packings but remove them.  He has been noncompliant, has a history of alcoholism.  Presents with some intermittent bleeding here.  Vital signs show initial tachycardia that resolved and he is sleeping comfortably upon evaluation.  No active bleeding upon my evaluation.  I did recommend renasal packing as the patient continues to keep on having persistent episodic bleeding but the patient adamantly refuses.  I discussed that we could try multiple rounds of Afrin, nose blowing and clamping but my suspicion is that this will continue to keep happening.  Despite my shared decision-making recommendations, patient refused and has decision-making capacity this time.  Subtle signs are unremarkable at time of discharge.  I did give him ENT follow-up, instructed on how to use Afrin, nasal clamping etc. for episodic epistaxis.  He is well.  Time of discharge, no acute distress and verbalized understanding outpatient follow-up with ENT and is amenable.    FINAL IMPRESSION  1. Epistaxis          Tierra MULTANI (Elizabethibe), am scribing for, and in the presence of, Anish Mchugh.    Electronically signed by: Tierra Parker (Elizabethibe), 3/12/2022    IAnish personally performed the services described in this documentation, as scribed by Tierra Parker in my presence, and it is both accurate and complete. E.     The note accurately reflects work and decisions made by me.  Anish Mchugh  3/12/2022  11:43 PM

## 2022-03-13 NOTE — DISCHARGE INSTRUCTIONS
If youbleeding again, please do exactly what we did in the ED, blow your nose out, spray the Afrin up your nostrils, put the nose clamp on.  Do this 3 times in a row.  For still bleeding, return to the ED.  Otherwise follow-up with the ENT whose name number I gave the above.  Thank you for coming in today.

## 2022-03-15 ENCOUNTER — HOSPITAL ENCOUNTER (EMERGENCY)
Facility: MEDICAL CENTER | Age: 57
End: 2022-03-15
Attending: EMERGENCY MEDICINE
Payer: COMMERCIAL

## 2022-03-15 VITALS
RESPIRATION RATE: 17 BRPM | WEIGHT: 130 LBS | OXYGEN SATURATION: 97 % | HEART RATE: 105 BPM | BODY MASS INDEX: 18.61 KG/M2 | DIASTOLIC BLOOD PRESSURE: 86 MMHG | TEMPERATURE: 98.3 F | SYSTOLIC BLOOD PRESSURE: 121 MMHG | HEIGHT: 70 IN

## 2022-03-15 VITALS
BODY MASS INDEX: 17.86 KG/M2 | SYSTOLIC BLOOD PRESSURE: 131 MMHG | HEIGHT: 70 IN | TEMPERATURE: 97.7 F | DIASTOLIC BLOOD PRESSURE: 84 MMHG | HEART RATE: 99 BPM | WEIGHT: 124.78 LBS | OXYGEN SATURATION: 97 % | RESPIRATION RATE: 15 BRPM

## 2022-03-15 DIAGNOSIS — R04.0 EPISTAXIS: ICD-10-CM

## 2022-03-15 LAB
BASOPHILS # BLD AUTO: 0.6 % (ref 0–1.8)
BASOPHILS # BLD: 0.04 K/UL (ref 0–0.12)
EOSINOPHIL # BLD AUTO: 0.02 K/UL (ref 0–0.51)
EOSINOPHIL NFR BLD: 0.3 % (ref 0–6.9)
ERYTHROCYTE [DISTWIDTH] IN BLOOD BY AUTOMATED COUNT: 53.6 FL (ref 35.9–50)
HCT VFR BLD AUTO: 25.6 % (ref 42–52)
HGB BLD-MCNC: 9.1 G/DL (ref 14–18)
IMM GRANULOCYTES # BLD AUTO: 0.04 K/UL (ref 0–0.11)
IMM GRANULOCYTES NFR BLD AUTO: 0.6 % (ref 0–0.9)
LYMPHOCYTES # BLD AUTO: 1.21 K/UL (ref 1–4.8)
LYMPHOCYTES NFR BLD: 18.6 % (ref 22–41)
MCH RBC QN AUTO: 36.4 PG (ref 27–33)
MCHC RBC AUTO-ENTMCNC: 35.5 G/DL (ref 33.7–35.3)
MCV RBC AUTO: 102.4 FL (ref 81.4–97.8)
MONOCYTES # BLD AUTO: 0.49 K/UL (ref 0–0.85)
MONOCYTES NFR BLD AUTO: 7.6 % (ref 0–13.4)
NEUTROPHILS # BLD AUTO: 4.69 K/UL (ref 1.82–7.42)
NEUTROPHILS NFR BLD: 72.3 % (ref 44–72)
NRBC # BLD AUTO: 0 K/UL
NRBC BLD-RTO: 0 /100 WBC
PLATELET # BLD AUTO: 235 K/UL (ref 164–446)
PMV BLD AUTO: 9.3 FL (ref 9–12.9)
RBC # BLD AUTO: 2.5 M/UL (ref 4.7–6.1)
WBC # BLD AUTO: 6.5 K/UL (ref 4.8–10.8)

## 2022-03-15 PROCEDURE — 99284 EMERGENCY DEPT VISIT MOD MDM: CPT

## 2022-03-15 PROCEDURE — 700101 HCHG RX REV CODE 250: Performed by: EMERGENCY MEDICINE

## 2022-03-15 PROCEDURE — 303620 HCHG EPISTAXIS CONTROL

## 2022-03-15 PROCEDURE — 99283 EMERGENCY DEPT VISIT LOW MDM: CPT

## 2022-03-15 PROCEDURE — 85025 COMPLETE CBC W/AUTO DIFF WBC: CPT

## 2022-03-15 PROCEDURE — 36415 COLL VENOUS BLD VENIPUNCTURE: CPT

## 2022-03-15 PROCEDURE — 700101 HCHG RX REV CODE 250

## 2022-03-15 RX ORDER — AMOXICILLIN AND CLAVULANATE POTASSIUM 875; 125 MG/1; MG/1
1 TABLET, FILM COATED ORAL 2 TIMES DAILY
Qty: 10 TABLET | Refills: 0 | Status: SHIPPED | OUTPATIENT
Start: 2022-03-15 | End: 2022-03-15 | Stop reason: SDUPTHER

## 2022-03-15 RX ORDER — TRANEXAMIC ACID 100 MG/ML
3 INJECTION, SOLUTION INTRAVENOUS ONCE
Status: COMPLETED | OUTPATIENT
Start: 2022-03-15 | End: 2022-03-15

## 2022-03-15 RX ORDER — TRANEXAMIC ACID 100 MG/ML
INJECTION, SOLUTION INTRAVENOUS
Status: COMPLETED
Start: 2022-03-15 | End: 2022-03-15

## 2022-03-15 RX ORDER — TRANEXAMIC ACID 100 MG/ML
3 INJECTION, SOLUTION INTRAVENOUS ONCE
Status: DISCONTINUED | OUTPATIENT
Start: 2022-03-15 | End: 2022-03-15

## 2022-03-15 RX ORDER — AMOXICILLIN AND CLAVULANATE POTASSIUM 875; 125 MG/1; MG/1
1 TABLET, FILM COATED ORAL 2 TIMES DAILY
Qty: 10 TABLET | Refills: 0 | Status: SHIPPED | OUTPATIENT
Start: 2022-03-15 | End: 2022-03-17 | Stop reason: SDUPTHER

## 2022-03-15 RX ADMIN — SILVER NITRATE APPLICATORS 1 APPLICATOR: 25; 75 STICK TOPICAL at 07:58

## 2022-03-15 RX ADMIN — TRANEXAMIC ACID 300 MG: 100 INJECTION, SOLUTION INTRAVENOUS at 09:34

## 2022-03-15 ASSESSMENT — ENCOUNTER SYMPTOMS
CONSTIPATION: 0
EYES NEGATIVE: 1
VOMITING: 1
MUSCULOSKELETAL NEGATIVE: 1
WHEEZING: 0
SPUTUM PRODUCTION: 0
TREMORS: 0
ORTHOPNEA: 0
WEIGHT LOSS: 0
HEADACHES: 0
CHILLS: 0
DIZZINESS: 1
SHORTNESS OF BREATH: 0
SORE THROAT: 0
DIARRHEA: 0
BLURRED VISION: 0
COUGH: 0
SENSORY CHANGE: 0
HEMOPTYSIS: 0
FEVER: 0
PALPITATIONS: 0
WEAKNESS: 1
NAUSEA: 1
SINUS PAIN: 0
DOUBLE VISION: 0
TINGLING: 0

## 2022-03-15 ASSESSMENT — FIBROSIS 4 INDEX
FIB4 SCORE: 2.68
FIB4 SCORE: 4.2

## 2022-03-15 NOTE — ED NOTES
Pt calling reporting nose starting to bleed again, reports he can feel it going down the back of his throat.  Nose clip reapplied.  ERP informed.

## 2022-03-15 NOTE — DISCHARGE INSTRUCTIONS
Keep the nasal packing in for the next 3 days.  Take the antibiotics while the packing is in place.  You can remove the packing in 3 days, if you do not feel comfortable with this please follow-up with either urgent care or the emergency department for packing removal.

## 2022-03-15 NOTE — ED NOTES
Discharge instructions given to pt including follow up with pcp or returning if no improvement of symptoms or to return if worse. Prescription x 1 provided to pt. Sent over to pt's pharmacy. Questions answered by RN. Denies any new complaints. Discharged w/stable vitals and able to ambulate to the lobby w/steady gait. Nasal clip provided to pt.

## 2022-03-15 NOTE — ED PROVIDER NOTES
ED Provider Note    CHIEF COMPLAINT  Chief Complaint   Patient presents with   • Nose Bleed     Bib remsa c/o nose bleed sx 1hr since 5:45am. Arrived w/nasal clip. Was seen here Saturday for same, states it slowed down then bleeding started again this am. Was given prescription of oxymetazoline w/o relief       HPI  Jhon Aguirre is a 57 y.o. male who presents with ongoing nose bleed. Patient's nose began bleeding on Saturday. He presented to [facility] three times and nasal packing was attempted. Each time, bleeding mainly came from his left nare, each time he left [facility] he removed the packing once he was discharged.   On 3/12 patient presented to the [facility] ED with continued bleeding, patient refused packing at that time. He was treated with multiple rounds of Afrin, nose blowing and nose clamping. An ENT follow-up referral was given.   Today, patient returns with continued bleeding from the left. Bleeding has stated since 5:35 am this morning. He states that he has bleeding whenever he walks 100 ft or more. He is experiencing weakness and dizziness with ambulation. He states that he has had bleeding for the past couple of hours. He has noticed blood in his throat. He has also had nausea and vomiting a couple of times. Prior to Saturday, he has not had any nosebleeds prior. Patient is not on anticoagulants or aspirin.     REVIEW OF SYSTEMS  Review of Systems   Constitutional: Positive for malaise/fatigue. Negative for chills, fever and weight loss.   HENT: Positive for nosebleeds. Negative for congestion, hearing loss, sinus pain, sore throat and tinnitus.    Eyes: Negative.  Negative for blurred vision and double vision.   Respiratory: Negative for cough, hemoptysis, sputum production, shortness of breath and wheezing.    Cardiovascular: Negative for chest pain, palpitations, orthopnea and leg swelling.   Gastrointestinal: Positive for nausea and vomiting. Negative for constipation and diarrhea.    Genitourinary: Negative.  Negative for dysuria, frequency, hematuria and urgency.   Musculoskeletal: Negative.    Skin: Negative.    Neurological: Positive for dizziness and weakness (Generalized). Negative for tingling, tremors, sensory change and headaches.   Endo/Heme/Allergies: Negative.      See HPI for further details. All other systems are negative.     PAST MEDICAL HISTORY   has a past medical history of Dental disorder, Pain, and Renal failure (2010).    SOCIAL HISTORY  Social History     Tobacco Use   • Smoking status: Current Every Day Smoker     Packs/day: 0.50     Years: 20.00     Pack years: 10.00     Types: Cigarettes   • Smokeless tobacco: Never Used   Vaping Use   • Vaping Use: Never used   Substance and Sexual Activity   • Alcohol use: Yes     Comment: once in a while    • Drug use: Yes     Comment: weed   • Sexual activity: Not on file       SURGICAL HISTORY   has a past surgical history that includes other; orif, fracture, radius and ulna (10/23/2011); and inguinal hernia repair (3/14/2014).    CURRENT MEDICATIONS  Home Medications     Reviewed by Pastora Hernandez R.N. (Registered Nurse) on 03/15/22 at 0721  Med List Status: Partial   Medication Last Dose Status   folic acid (FOLVITE) 1 MG Tab  Active   multivitamin (THERAGRAN) Tab  Active   nicotine (NICODERM) 21 MG/24HR PATCH 24 HR  Active   omeprazole (PRILOSEC) 20 MG delayed-release capsule  Active   thiamine (THIAMINE) 100 MG tablet  Active                ALLERGIES  No Known Allergies    PHYSICAL EXAM  Vitals:    03/15/22 0715   BP: 145/90   Pulse: (!) 115   Resp: 20   Temp: 36.8 °C (98.3 °F)   SpO2: 97%       Physical Exam  Constitutional:       General: He is not in acute distress.     Appearance: Normal appearance. He is not ill-appearing, toxic-appearing or diaphoretic.   HENT:      Head: Normocephalic and atraumatic.      Nose:      Right Nostril: No foreign body or epistaxis.      Left Nostril: Epistaxis present. No foreign body.       Right Turbinates: Swollen.      Left Turbinates: Swollen.      Comments: Increased bleeding at left nare     Mouth/Throat:      Mouth: Mucous membranes are moist.      Comments: Blood present in back of throat  Eyes:      General: No scleral icterus.        Right eye: No discharge.         Left eye: No discharge.      Extraocular Movements: Extraocular movements intact.      Pupils: Pupils are equal, round, and reactive to light.   Neck:      Vascular: No carotid bruit.   Cardiovascular:      Rate and Rhythm: Normal rate and regular rhythm.      Pulses: Normal pulses.      Heart sounds: Normal heart sounds. No murmur heard.    No friction rub. No gallop.   Pulmonary:      Effort: Pulmonary effort is normal. No respiratory distress.      Breath sounds: Normal breath sounds. No stridor. No wheezing or rhonchi.   Abdominal:      General: Abdomen is flat. Bowel sounds are normal. There is no distension.      Palpations: Abdomen is soft. There is no mass.      Tenderness: There is no abdominal tenderness.      Hernia: No hernia is present.   Musculoskeletal:         General: No swelling or tenderness. Normal range of motion.      Cervical back: Normal range of motion. No rigidity or tenderness.      Right lower leg: No edema.      Left lower leg: No edema.   Lymphadenopathy:      Cervical: No cervical adenopathy.   Skin:     General: Skin is warm and dry.   Neurological:      General: No focal deficit present.      Mental Status: He is alert and oriented to person, place, and time.       Results for orders placed or performed during the hospital encounter of 03/15/22   CBC WITH DIFFERENTIAL   Result Value Ref Range    WBC 6.5 4.8 - 10.8 K/uL    RBC 2.50 (L) 4.70 - 6.10 M/uL    Hemoglobin 9.1 (L) 14.0 - 18.0 g/dL    Hematocrit 25.6 (L) 42.0 - 52.0 %    .4 (H) 81.4 - 97.8 fL    MCH 36.4 (H) 27.0 - 33.0 pg    MCHC 35.5 (H) 33.7 - 35.3 g/dL    RDW 53.6 (H) 35.9 - 50.0 fL    Platelet Count 235 164 - 446 K/uL    MPV 9.3  9.0 - 12.9 fL    Neutrophils-Polys 72.30 (H) 44.00 - 72.00 %    Lymphocytes 18.60 (L) 22.00 - 41.00 %    Monocytes 7.60 0.00 - 13.40 %    Eosinophils 0.30 0.00 - 6.90 %    Basophils 0.60 0.00 - 1.80 %    Immature Granulocytes 0.60 0.00 - 0.90 %    Nucleated RBC 0.00 /100 WBC    Neutrophils (Absolute) 4.69 1.82 - 7.42 K/uL    Lymphs (Absolute) 1.21 1.00 - 4.80 K/uL    Monos (Absolute) 0.49 0.00 - 0.85 K/uL    Eos (Absolute) 0.02 0.00 - 0.51 K/uL    Baso (Absolute) 0.04 0.00 - 0.12 K/uL    Immature Granulocytes (abs) 0.04 0.00 - 0.11 K/uL    NRBC (Absolute) 0.00 K/uL         COURSE & MEDICAL DECISION MAKING  Pertinent Labs & Imaging studies reviewed. (See chart for details)    Patient presented with acute epistaxis that has been intermittent since Saturday 3/12. Patient has had multiple episodes of packing at [place] as well as Afirin treatment here on 3/12, and use of nose clamping. Patient has had increased bleeding with movement.   Patient had minimal bleeding with removal of nasal clamp and tissues. Silver nitrate was placed in left nare. Patient began to have increased bleeding and with blood going down throat. Left nasal packing was placed tranexamic acid was placed into packing after placement.   CBC showed hemoglobin of 9.1, which shows anemia, but not acute anemia. MCV of 102.4 shows possible chronic anemia based on nutritional deficiency.   Bleeding stopped with packing and tranexamic acid. Discussed keeping packing in place for three days and self removal or coming into the ER for removal. Given 5 day prescription of Augmentin.    Follow up with [facility] in future.     The patient will not drink alcohol nor drive with prescribed medications. The patient will return for worsening symptoms and is stable at the time of discharge. The patient verbalizes understanding and will comply.    FINAL IMPRESSION  1. Epistaxis, idiopathic etiology  2.  Anemia of nutrition deficiency, normal platelet count  3.Follow up  with [facility] for further workup of epistaxis            Patient seen by my resident, who wrote the above.  I agree with the above.  Patient with minor nosebleed that has been ongoing and unfortunately has persisted despite prior emergency department visits.  Patient nasal packed with Merocel soaked with TXA.  This resulted in hemostasis.  Return precautions discussed.  Patient started on Augmentin and sent home with Augmentin.

## 2022-03-15 NOTE — ED NOTES
Pt c/o his left nasal packing feeling loose. erp made aware to check it out before getting discharge home.   erp will re-evaluate pt's nasal packing again.

## 2022-03-15 NOTE — ED TRIAGE NOTES
Chief Complaint   Patient presents with   • Nose Bleed     Bib remsa c/o nose bleed sx 1hr since 5:45am. Arrived w/nasal clip. Was seen here Saturday for same, states it slowed down then bleeding started again this am. Was given prescription of oxymetazoline w/o relief     Also reports black stool since yesterday and today starting to feel light headed. Arrived aaox4. Denies blood thinners.

## 2022-03-16 ENCOUNTER — HOSPITAL ENCOUNTER (EMERGENCY)
Facility: MEDICAL CENTER | Age: 57
End: 2022-03-16
Attending: EMERGENCY MEDICINE
Payer: COMMERCIAL

## 2022-03-16 VITALS
HEIGHT: 70 IN | DIASTOLIC BLOOD PRESSURE: 86 MMHG | TEMPERATURE: 98.9 F | OXYGEN SATURATION: 97 % | RESPIRATION RATE: 16 BRPM | SYSTOLIC BLOOD PRESSURE: 136 MMHG | WEIGHT: 130 LBS | BODY MASS INDEX: 18.61 KG/M2 | HEART RATE: 83 BPM

## 2022-03-16 DIAGNOSIS — D64.9 ANEMIA, UNSPECIFIED TYPE: ICD-10-CM

## 2022-03-16 DIAGNOSIS — R04.0 EPISTAXIS: ICD-10-CM

## 2022-03-16 LAB
BASOPHILS # BLD AUTO: 0.4 % (ref 0–1.8)
BASOPHILS # BLD: 0.03 K/UL (ref 0–0.12)
EOSINOPHIL # BLD AUTO: 0.01 K/UL (ref 0–0.51)
EOSINOPHIL NFR BLD: 0.1 % (ref 0–6.9)
ERYTHROCYTE [DISTWIDTH] IN BLOOD BY AUTOMATED COUNT: 54.9 FL (ref 35.9–50)
HCT VFR BLD AUTO: 22.9 % (ref 42–52)
HGB BLD-MCNC: 8 G/DL (ref 14–18)
IMM GRANULOCYTES # BLD AUTO: 0.05 K/UL (ref 0–0.11)
IMM GRANULOCYTES NFR BLD AUTO: 0.7 % (ref 0–0.9)
LYMPHOCYTES # BLD AUTO: 0.97 K/UL (ref 1–4.8)
LYMPHOCYTES NFR BLD: 13.1 % (ref 22–41)
MCH RBC QN AUTO: 37 PG (ref 27–33)
MCHC RBC AUTO-ENTMCNC: 34.9 G/DL (ref 33.7–35.3)
MCV RBC AUTO: 106 FL (ref 81.4–97.8)
MONOCYTES # BLD AUTO: 0.75 K/UL (ref 0–0.85)
MONOCYTES NFR BLD AUTO: 10.1 % (ref 0–13.4)
NEUTROPHILS # BLD AUTO: 5.58 K/UL (ref 1.82–7.42)
NEUTROPHILS NFR BLD: 75.6 % (ref 44–72)
NRBC # BLD AUTO: 0.03 K/UL
NRBC BLD-RTO: 0.4 /100 WBC
PLATELET # BLD AUTO: 223 K/UL (ref 164–446)
PMV BLD AUTO: 9.6 FL (ref 9–12.9)
RBC # BLD AUTO: 2.16 M/UL (ref 4.7–6.1)
WBC # BLD AUTO: 7.4 K/UL (ref 4.8–10.8)

## 2022-03-16 PROCEDURE — 700111 HCHG RX REV CODE 636 W/ 250 OVERRIDE (IP): Performed by: EMERGENCY MEDICINE

## 2022-03-16 PROCEDURE — 700102 HCHG RX REV CODE 250 W/ 637 OVERRIDE(OP): Performed by: EMERGENCY MEDICINE

## 2022-03-16 PROCEDURE — 700101 HCHG RX REV CODE 250: Performed by: EMERGENCY MEDICINE

## 2022-03-16 PROCEDURE — 85025 COMPLETE CBC W/AUTO DIFF WBC: CPT

## 2022-03-16 PROCEDURE — 96372 THER/PROPH/DIAG INJ SC/IM: CPT

## 2022-03-16 PROCEDURE — 36415 COLL VENOUS BLD VENIPUNCTURE: CPT

## 2022-03-16 PROCEDURE — 99284 EMERGENCY DEPT VISIT MOD MDM: CPT

## 2022-03-16 PROCEDURE — A9270 NON-COVERED ITEM OR SERVICE: HCPCS | Performed by: EMERGENCY MEDICINE

## 2022-03-16 RX ORDER — TRANEXAMIC ACID 100 MG/ML
3 INJECTION, SOLUTION INTRAVENOUS ONCE
Status: DISCONTINUED | OUTPATIENT
Start: 2022-03-16 | End: 2022-03-16 | Stop reason: HOSPADM

## 2022-03-16 RX ORDER — MORPHINE SULFATE 4 MG/ML
4 INJECTION INTRAVENOUS ONCE
Status: COMPLETED | OUTPATIENT
Start: 2022-03-16 | End: 2022-03-16

## 2022-03-16 RX ORDER — LIDOCAINE HYDROCHLORIDE AND EPINEPHRINE BITARTRATE 20; .01 MG/ML; MG/ML
10 INJECTION, SOLUTION SUBCUTANEOUS ONCE
Status: COMPLETED | OUTPATIENT
Start: 2022-03-16 | End: 2022-03-16

## 2022-03-16 RX ORDER — OXYMETAZOLINE HYDROCHLORIDE 0.05 G/100ML
2 SPRAY NASAL ONCE
Status: COMPLETED | OUTPATIENT
Start: 2022-03-16 | End: 2022-03-16

## 2022-03-16 RX ADMIN — OXYMETAZOLINE HCL 2 SPRAY: 0.05 SPRAY NASAL at 14:15

## 2022-03-16 RX ADMIN — LIDOCAINE HYDROCHLORIDE AND EPINEPHRINE 10 ML: 20; 10 INJECTION, SOLUTION INFILTRATION; PERINEURAL at 14:30

## 2022-03-16 RX ADMIN — MORPHINE SULFATE 4 MG: 4 INJECTION INTRAVENOUS at 14:05

## 2022-03-16 ASSESSMENT — ENCOUNTER SYMPTOMS
VOMITING: 1
FEVER: 0
NAUSEA: 1
DIZZINESS: 0
CHILLS: 0

## 2022-03-16 ASSESSMENT — FIBROSIS 4 INDEX: FIB4 SCORE: 2.68

## 2022-03-16 NOTE — ED TRIAGE NOTES
[facility]  57 y.o. male  Chief Complaint   Patient presents with   • Epistaxis     Since Saturday. Was painted and packed Saturday in ER. Bleeding began again Sunday and has not stopped   • N/V     Swallowing blood and vomiting    • Abdominal Cramps     Since onset of epistaxis Saturday     Pt BIB EMS for above complaint.    Pt is alert and oriented, speaking in full sentences, follows commands and responds appropriately to questions. Resp are even and unlabored.   This RN masked and in appropriate PPE during encounter.     Vitals:    03/16/22 1344   BP: 130/82   Pulse: (!) 120   Resp: 18   Temp: 37.2 °C (98.9 °F)   SpO2: 99%

## 2022-03-16 NOTE — ED NOTES
Pt was escorted from lobby to room. Pt was placed on the monitor and made aware his RN will be in shortly. Pt resting quietly in bed.

## 2022-03-16 NOTE — ED NOTES
Pt A/Ox3 unlaboured breathing VSS. Agrees and understands d/c teaching. Ambulated off unit tolerated well.

## 2022-03-16 NOTE — ED NOTES
Pt is alert and oriented, speaking in full sentences, follows commands and responds appropriately to questions. Resp are even and unlabored. Bleeding controlled PTA. On monitor. No needs expressed at this point in time.

## 2022-03-16 NOTE — ED TRIAGE NOTES
"Chief Complaint   Patient presents with   • Epistaxis     Started on Saturday.  Seen at [facility], cauterized, then packed.  Came in for continued bleeding. -thinners       Pt ambulated to triage, brought in my EMS. Pt A&Ox4, came in for above complaint. Not actively bleeding at this time.  Packing still in place to L nostril    Pt to lobby . Pt educated on alerting staff in changes to condition. Pt verbalized understanding.     /99   Pulse (!) 110   Resp 18   Ht 1.778 m (5' 10\")   Wt 56.6 kg (124 lb 12.5 oz)   SpO2 95%   BMI 17.90 kg/m²     "

## 2022-03-16 NOTE — ED NOTES
discharge instructions reviewed pt communicated understanding all questions answered at this time

## 2022-03-16 NOTE — ED PROVIDER NOTES
ED Provider Note    CHIEF COMPLAINT  Chief Complaint   Patient presents with   • Epistaxis     Started on Saturday.  Seen at [facility], cauterized, then packed.  Came in for continued bleeding. -thinners       [facility]  Jhon Aguirre is a 57 y.o. male who presents with epistaxis.  The patient was evaluated here this morning for epistaxis and had packing placed in the left nostril.  The patient was seen here Saturday as well for the same.  Is also been in [place] multiple times for recurrent epistaxis.  He denies amphetamine and cocaine abuse.  He states the packing seems to be swelling outside of the left nostril and he continues have some bleeding from the right nostril.    REVIEW OF SYSTEMS  See HPI for further details. All other systems are negative.     PAST MEDICAL HISTORY  Past Medical History:   Diagnosis Date   • Renal failure 2010    Resolved, due to dehydration   • Dental disorder     poor dentations  broken teeth   • Pain        FAMILY HISTORY  [unfilled]    SOCIAL HISTORY  Social History     Socioeconomic History   • Marital status:    Tobacco Use   • Smoking status: Current Every Day Smoker     Packs/day: 0.50     Years: 20.00     Pack years: 10.00     Types: Cigarettes   • Smokeless tobacco: Never Used   Vaping Use   • Vaping Use: Never used   Substance and Sexual Activity   • Alcohol use: Yes     Comment: once in a while    • Drug use: Yes     Comment: weed       SURGICAL HISTORY  Past Surgical History:   Procedure Laterality Date   • INGUINAL HERNIA REPAIR  3/14/2014    Performed by David Ramirez M.D. at SURGERY SAME DAY [facility] ORS   • ORIF, FRACTURE, RADIUS AND ULNA  10/23/2011    Performed by GERMAN REILLY at SURGERY [facility] ORS   • OTHER      broken jaw       CURRENT MEDICATIONS  Home Medications    **Home medications have not yet been reviewed for this encounter**         ALLERGIES  No Known Allergies    PHYSICAL EXAM  VITAL SIGNS: /77   Pulse (!) 115   Temp  "36.1 °C (97 °F) (Temporal)   Resp 14   Ht 1.778 m (5' 10\")   Wt 56.6 kg (124 lb 12.5 oz)   SpO2 98%   BMI 17.90 kg/m²       Constitutional: Well developed, Well nourished, No acute distress, Non-toxic appearance.   HENT: Normocephalic, Atraumatic, Bilateral external ears normal,  No oral exudates, Merocel packing in the left nostril, slight bleeding from the anterior septum on the right, no bleeding in the oropharynx appreciated  Eyes: PERRLA, EOMI, Conjunctiva normal, No discharge.   Neck: Normal range of motion, No tenderness, Supple, No stridor.   Lymphatic: No lymphadenopathy noted.   Cardiovascular: Slightly tachycardic heart rate, Normal rhythm, No murmurs, No rubs, No gallops.   Thorax & Lungs: Normal breath sounds, No respiratory distress, No wheezing, No chest tenderness.   Abdomen: Bowel sounds normal, Soft, No tenderness, No masses, No pulsatile masses.   Skin: Warm, Dry, No erythema, No rash.   Back: No tenderness, No CVA tenderness.   Extremities: Intact distal pulses, No edema, No tenderness, No cyanosis, No clubbing. .   Neurologic: Alert & oriented x 3, Normal motor function, Normal sensory function, No focal deficits noted.   Psychiatric: Affect normal, Judgment normal, Mood normal.       COURSE & MEDICAL DECISION MAKING  Pertinent Labs & Imaging studies reviewed. (See chart for details)  This a 57-year-old male who presents the emerge department with epistaxis.  The patient's been observed for approximate hour.  Does not have any active bleeding.  Therefore we will leave the packing in place in the left nostril as I do not want to disrupt the clot that is currently forming.  I did give the patient some cotton balls and he has Afrin that he can utilize to soaked cotton ball for further bleeding on the right.  He will follow up with ENT as scheduled.  He was hypertensive in triage but repeat blood pressure has shown him to be normotensive.  The patient will return if he is acutely " DR. Park worse.    FINAL IMPRESSION  1.  Epistaxis    Disposition  The patient will be discharged in stable condition    Electronically signed by: Humza Devries M.D., 3/15/2022 10:20 PM

## 2022-03-16 NOTE — ED PROVIDER NOTES
ED Provider Note    Scribed for Sayra Zuleta M.D. by Ewa Ware. 3/16/2022, 1:50 PM.    Primary care provider: Pcp Pt States None  Means of arrival: Walk-in  History obtained from: Patient  History limited by: None    CHIEF COMPLAINT  Chief Complaint   Patient presents with   • Epistaxis     Since Saturday. Was painted and packed Saturday in ER. Bleeding began again Sunday and has not stopped   • N/V     Swallowing blood and vomiting    • Abdominal Cramps     Since onset of epistaxis Saturday       LIGIA Aguirre is a 57 y.o. male who presents to the Emergency Department for epistaxis onset 4 days ago.  Patient has been to the emergency department multiple times for this complaint.  2 days ago patient had his nose cauterized, however the bleeding subsequently returned.  He came back to the emergency department and had his nose packed.  He came back yesterday evening because he thought he was still bleeding through the packing but on evaluation bleeding had resolved and therefore packing was left in.  Patient reports that throughout the day he has had bleeding into his posterior oropharynx and has been swallowing blood despite the packing that is in place.  He has had associated nausea and vomiting secondary to the bleeding.  He has associated nausea. Denies use of ASA or blood thinners.  Denies any trauma to the nose  REVIEW OF SYSTEMS  Review of Systems   Constitutional: Negative for chills and fever.   HENT: Positive for nosebleeds.    Gastrointestinal: Positive for nausea and vomiting.   Neurological: Negative for dizziness.        PAST MEDICAL HISTORY   has a past medical history of Dental disorder, Pain, and Renal failure (2010).    SURGICAL HISTORY   has a past surgical history that includes other; orif, fracture, radius and ulna (10/23/2011); and inguinal hernia repair (3/14/2014).    SOCIAL HISTORY  Social History     Tobacco Use   • Smoking status: Current Every Day Smoker      "Packs/day: 0.50     Years: 20.00     Pack years: 10.00     Types: Cigarettes   • Smokeless tobacco: Never Used   Vaping Use   • Vaping Use: Never used   Substance Use Topics   • Alcohol use: Yes     Comment: once in a while    • Drug use: Yes     Comment: weed      Social History     Substance and Sexual Activity   Drug Use Yes    Comment: weed       FAMILY HISTORY  History reviewed. No pertinent family history.    CURRENT MEDICATIONS  Home Medications     Reviewed by Maurice Santana R.N. (Registered Nurse) on 03/16/22 at 1347  Med List Status: Partial   Medication Last Dose Status   amoxicillin-clavulanate (AUGMENTIN) 875-125 MG Tab  Active   folic acid (FOLVITE) 1 MG Tab  Active   multivitamin (THERAGRAN) Tab  Active   nicotine (NICODERM) 21 MG/24HR PATCH 24 HR  Active   omeprazole (PRILOSEC) 20 MG delayed-release capsule  Active   thiamine (THIAMINE) 100 MG tablet  Active                 ALLERGIES  No Known Allergies    PHYSICAL EXAM  VITAL SIGNS: /82   Pulse (!) 120   Temp 37.2 °C (98.9 °F) (Temporal)   Resp 18   Ht 1.778 m (5' 10\")   Wt 59 kg (130 lb)   SpO2 99%   BMI 18.65 kg/m²   Vitals reviewed by myself.  Physical Exam  Nursing note and vitals reviewed.  Constitutional: Well-developed and well-nourished. No acute distress.   HENT: Head is normocephalic and atraumatic.  No blood in the posterior oropharynx, packing is in place in the left nare  Eyes: extra-ocular movements intact  Cardiovascular: Tachycardic rate and regular rhythm. No murmur heard.  Pulmonary/Chest: Breath sounds normal. No wheezes or rales.   Musculoskeletal: Extremities exhibit normal range of motion without edema or tenderness.   Neurological: Awake and alert  Skin: Skin is warm and dry. No rash.       DIAGNOSTIC STUDIES  LABS  Labs Reviewed   CBC WITH DIFFERENTIAL - Abnormal; Notable for the following components:       Result Value    RBC 2.16 (*)     Hemoglobin 8.0 (*)     Hematocrit 22.9 (*)     .0 (*)     MCH " 37.0 (*)     RDW 54.9 (*)     Neutrophils-Polys 75.60 (*)     Lymphocytes 13.10 (*)     Lymphs (Absolute) 0.97 (*)     All other components within normal limits     All labs reviewed by me.      REASSESSMENT    1:50 PM - Patient seen and examined at bedside. Discussed plan of care, including repacking his nose and obtaining a blood count to assure he is not anemic. Patient agrees to the plan of care.     COURSE & MEDICAL DECISION MAKING  Nursing notes, VS, PMSFHx reviewed in chart.    Patient is a 57-year-old male who comes in for evaluation of epistaxis.  On physical exam patient is well-appearing, vitals notable for slight tachycardia.  He is noted to have packing in place in the left nare.  I elected to remove this after which he had scant bleeding, I instilled lidocaine with epi into the nare and applied nasal clamp.  This was left on for 30 minutes, CBC was drawn during this time.  Patient's hemoglobin returned and is 8, yesterday was 9.1.  He did take a drop.  Upon reassessment nasal clamp was removed and patient has no active bleeding.  I looked into the nares and saw no clot, no site of bleeding that I could cauterize.  Patient is observed in the emergency department for an hour after and has no recurrent bleeding.  He is advised on the use of Afrin at bedside and advised on how to use it at home as well.  He is also advised on preventative measures to help with nosebleeding.  I discussed his anemia with him and advised him that if he did have recurrent bleeding this will be need to be monitored closely.  In the meantime he can take iron pills to help with recovery of his blood loss anemia.  Patient is amenable to this plan.  He is then given strict return precautions and discharged in stable condition.        DISPOSITION:  Patient will be discharged home in stable condition.    FOLLOW UP:  [facility] ([place]  [address] [facility] [address] [phone]          FINAL IMPRESSION  1. Epistaxis     2. Anemia, unspecified type          I, Ewa Ware (Elham), am scribing for, and in the presence of, Sayra Zuleta M.D..    Electronically signed by: Ewa Ware (Elham), 3/16/2022    ISayra M.D. personally performed the services described in this documentation, as scribed by Ewa Ware in my presence, and it is both accurate and complete.     The note accurately reflects work and decisions made by me.  Sayra Zuleta M.D.  3/16/2022  4:11 PM

## 2022-03-16 NOTE — ED NOTES
Pt resting on gurney. No acute distress. VSS. Pt states understanding of POC. No complaints/requests at this time. Call bell within reach.

## 2022-03-17 ENCOUNTER — HOSPITAL ENCOUNTER (EMERGENCY)
Facility: MEDICAL CENTER | Age: 57
End: 2022-03-17
Attending: EMERGENCY MEDICINE
Payer: COMMERCIAL

## 2022-03-17 VITALS
DIASTOLIC BLOOD PRESSURE: 59 MMHG | SYSTOLIC BLOOD PRESSURE: 142 MMHG | RESPIRATION RATE: 16 BRPM | OXYGEN SATURATION: 98 % | HEART RATE: 81 BPM | HEIGHT: 70 IN | WEIGHT: 130 LBS | BODY MASS INDEX: 18.61 KG/M2 | TEMPERATURE: 96.7 F

## 2022-03-17 DIAGNOSIS — R04.0 EPISTAXIS: ICD-10-CM

## 2022-03-17 PROCEDURE — 99284 EMERGENCY DEPT VISIT MOD MDM: CPT

## 2022-03-17 PROCEDURE — 303620 HCHG EPISTAXIS CONTROL

## 2022-03-17 PROCEDURE — 700102 HCHG RX REV CODE 250 W/ 637 OVERRIDE(OP): Performed by: EMERGENCY MEDICINE

## 2022-03-17 PROCEDURE — 700101 HCHG RX REV CODE 250: Performed by: EMERGENCY MEDICINE

## 2022-03-17 PROCEDURE — A9270 NON-COVERED ITEM OR SERVICE: HCPCS | Performed by: EMERGENCY MEDICINE

## 2022-03-17 RX ORDER — HYDROCODONE BITARTRATE AND ACETAMINOPHEN 5; 325 MG/1; MG/1
1 TABLET ORAL ONCE
Status: COMPLETED | OUTPATIENT
Start: 2022-03-17 | End: 2022-03-17

## 2022-03-17 RX ORDER — AMOXICILLIN AND CLAVULANATE POTASSIUM 875; 125 MG/1; MG/1
1 TABLET, FILM COATED ORAL 2 TIMES DAILY
Qty: 10 TABLET | Refills: 0 | Status: SHIPPED | OUTPATIENT
Start: 2022-03-17 | End: 2022-03-22

## 2022-03-17 RX ORDER — AMOXICILLIN AND CLAVULANATE POTASSIUM 875; 125 MG/1; MG/1
1 TABLET, FILM COATED ORAL 2 TIMES DAILY
Qty: 10 TABLET | Refills: 0 | Status: SHIPPED | OUTPATIENT
Start: 2022-03-17 | End: 2022-03-17 | Stop reason: SDUPTHER

## 2022-03-17 RX ORDER — TRANEXAMIC ACID 100 MG/ML
3 INJECTION, SOLUTION INTRAVENOUS ONCE
Status: COMPLETED | OUTPATIENT
Start: 2022-03-17 | End: 2022-03-17

## 2022-03-17 RX ORDER — AMOXICILLIN AND CLAVULANATE POTASSIUM 875; 125 MG/1; MG/1
1 TABLET, FILM COATED ORAL ONCE
Status: COMPLETED | OUTPATIENT
Start: 2022-03-17 | End: 2022-03-17

## 2022-03-17 RX ADMIN — HYDROCODONE BITARTRATE AND ACETAMINOPHEN 1 TABLET: 5; 325 TABLET ORAL at 00:45

## 2022-03-17 RX ADMIN — TRANEXAMIC ACID 300 MG: 100 INJECTION, SOLUTION INTRAVENOUS at 00:30

## 2022-03-17 RX ADMIN — AMOXICILLIN AND CLAVULANATE POTASSIUM 1 TABLET: 875; 125 TABLET, FILM COATED ORAL at 01:05

## 2022-03-17 ASSESSMENT — FIBROSIS 4 INDEX: FIB4 SCORE: 2.82

## 2022-03-17 ASSESSMENT — LIFESTYLE VARIABLES: DOES PATIENT WANT TO STOP DRINKING: NO

## 2022-03-17 NOTE — ED TRIAGE NOTES
"Chief Complaint   Patient presents with   • Epistaxis     Has been seen many times recently for nosebleed, has had it packed and cauterized.  Started bleeding again today at 1700. Came in with nose clamp in place, actively bleeding at this time       BIB EMS to [facility], pt on monitor and in gown. Pt consists of: came in for above complaint.    Medications given en route:n/a, clamp in place to upper nose, packing dislodged upon arrival.    BP (!) 166/106   Pulse (!) 108   Temp 35.8 °C (96.5 °F) (Oral)   Resp 17   Ht 1.778 m (5' 10\")   Wt 59 kg (130 lb)   SpO2 98%   BMI 18.65 kg/m²   "

## 2022-03-17 NOTE — ED NOTES
"Pt discharged home. Pt in possession of belongings. Pt provided discharge education and information pertaining to medications and follow up appointments. Pt received copy of discharge instructions and verbalized understanding. /59   Pulse 81   Temp 35.9 °C (96.7 °F) (Temporal)   Resp 16   Ht 1.778 m (5' 10\")   Wt 59 kg (130 lb)   SpO2 98%   BMI 18.65 kg/m²     "

## 2022-03-17 NOTE — ED PROVIDER NOTES
ER Provider Note     Scribed for Antoine Jones M.D. by Carrillo Jaimes. 3/17/2022, 12:42 AM.    Primary Care Provider: Pcp Pt States None  Means of Arrival: EMS   History obtained from: Patient  History limited by: None     CHIEF COMPLAINT  Chief Complaint   Patient presents with    Epistaxis     Has been seen many times recently for nosebleed, has had it packed and cauterized.  Started bleeding again today at 1700. Came in with nose clamp in place, actively bleeding at this time       HPI  Jhon Aguirre is a 57 y.o. male who presents to the Emergency Department via EMS for evaluation of moderate epistaxis onset 7 days ago. The patient states that he has been seen multiple times in the ED over the past few days for similar symptoms, but was prompted to visit the ED today because his nose began bleeding profusely at approximately 5:00 PM yesterday. The patient had a nose clamp placed prior to arrival and was actively bleeding. Patient has had some packing done but has yet to have it fully packed. The patient is actively bleeding out of his left nare. The patient denies any dyspnea or chest pain. The patient denies any previous nasal surgery. No alleviating or exacerbating factors noted    REVIEW OF SYSTEMS  See HPI for further details.    PAST MEDICAL HISTORY   has a past medical history of Dental disorder, Pain, and Renal failure (2010).    SURGICAL HISTORY   has a past surgical history that includes other; orif, fracture, radius and ulna (10/23/2011); and inguinal hernia repair (3/14/2014).    SOCIAL HISTORY  Social History     Tobacco Use    Smoking status: Current Every Day Smoker     Packs/day: 0.50     Years: 20.00     Pack years: 10.00     Types: Cigarettes    Smokeless tobacco: Never Used   Vaping Use    Vaping Use: Never used   Substance Use Topics    Alcohol use: Yes     Comment: once in a while     Drug use: Yes     Comment: weed      Social History     Substance and Sexual Activity   Drug Use  "Yes    Comment: weed       FAMILY HISTORY  History reviewed. No pertinent family history.    CURRENT MEDICATIONS  Home Medications    **Home medications have not yet been reviewed for this encounter**         ALLERGIES  No Known Allergies    PHYSICAL EXAM  VITAL SIGNS: BP (!) 166/106   Pulse (!) 108   Temp 35.8 °C (96.5 °F) (Oral)   Resp 17   Ht 1.778 m (5' 10\")   Wt 59 kg (130 lb)   SpO2 98%   BMI 18.65 kg/m²      Constitutional: Alert in no apparent distress.  HENT: Normocephalic, Atraumatic, Bilateral external ears normal. Oozing and bleeding from left nare. Blood present in the back of the throat.   Eyes: Pupils are equal and reactive. Conjunctiva normal, non-icteric.   Heart: Regular rate and rythm,    Lungs: No respiratory distress   Skin: Warm, Dry, No erythema, No rash.   Neurologic: Alert, Grossly non-focal.   Psychiatric: Affect normal, Judgment normal, Mood normal, Appears appropriate and not intoxicated.     PROCEDURE    Epistaxis Procedure Note    Indication: Bleeding    Pre-medication: TXA    Procedure: The patient was positioned appropriately and the nares were cleared as well as possible. The bleeding site was localized to the left nare in the middle region and tamponaded with an anterior/ posterior rapid Rhino nasal pack .  Hemostasis was obtained.    The patient tolerated the procedure well.    Complications: None    COURSE & MEDICAL DECISION MAKING  Pertinent Labs & Imaging studies reviewed. (See chart for details)    This is a 57 y.o. male that presents with epistaxis.  I will pack the nose and stop the bleeding.  I will discharge the patient home with antibiotics..     12:42 AM - Patient seen and examined at bedside. Patient will be medicated with Norco 5-325 mg and Cyklokapron 1000 MG/10 ML for his symptoms    1:20 AM - Epistaxis procedure performed by me, as detailed above.    1:50 AM -. Discussed plan for discharge including Augmentin 875-125 mg; I advised the patient to follow-up with " Dr. Patterson in 2 days, and to return to the [facility] ED with any new or worsening symptoms. Patient was given the opportunity for questions. I addressed all questions or concerns at this time and they verbalize agreement to the plan of care.      The patient will return for new or worsening symptoms and is stable at the time of discharge.    The patient is referred to a primary physician for blood pressure management, diabetic screening, and for all other preventative health concerns.    DISPOSITION:  Patient will be discharged home in stable condition.    FOLLOW UP:  Antoine Patterson M.D.  [address] [facility] [address]  [phone]    In 2 days        OUTPATIENT MEDICATIONS:  Discharge Medication List as of 3/17/2022  1:37 AM          FINAL IMPRESSION  1. Epistaxis    2.      Epistaxis procedure performed by me, as detailed above.      Carrillo MULTANI (Elizabethibe), am scribing for, and in the presence of, Antoine Jones M.D..    Electronically signed by: Carrillo Jaimes (Elham), 3/17/2022 Antoine JUSTICE M.D. personally performed the services described in this documentation, as scribed by Carrillo Jaimes in my presence, and it is both accurate and complete.     The note accurately reflects work and decisions made by me.  Antoine Jones M.D.  3/17/2022  6:08 AM

## 2022-07-26 ENCOUNTER — HOSPITAL ENCOUNTER (EMERGENCY)
Facility: MEDICAL CENTER | Age: 57
End: 2022-07-27
Attending: STUDENT IN AN ORGANIZED HEALTH CARE EDUCATION/TRAINING PROGRAM
Payer: MEDICAID

## 2022-07-26 DIAGNOSIS — S62.626A CLOSED DISPLACED FRACTURE OF MIDDLE PHALANX OF RIGHT LITTLE FINGER, INITIAL ENCOUNTER: ICD-10-CM

## 2022-07-26 PROCEDURE — 99283 EMERGENCY DEPT VISIT LOW MDM: CPT

## 2022-07-26 ASSESSMENT — FIBROSIS 4 INDEX: FIB4 SCORE: 2.82

## 2022-07-27 ENCOUNTER — HOSPITAL ENCOUNTER (OUTPATIENT)
Dept: RADIOLOGY | Facility: MEDICAL CENTER | Age: 57
End: 2022-07-27
Attending: STUDENT IN AN ORGANIZED HEALTH CARE EDUCATION/TRAINING PROGRAM
Payer: MEDICAID

## 2022-07-27 VITALS
OXYGEN SATURATION: 97 % | HEIGHT: 69 IN | DIASTOLIC BLOOD PRESSURE: 95 MMHG | WEIGHT: 124.78 LBS | SYSTOLIC BLOOD PRESSURE: 168 MMHG | RESPIRATION RATE: 18 BRPM | HEART RATE: 80 BPM | TEMPERATURE: 98.2 F | BODY MASS INDEX: 18.48 KG/M2

## 2022-07-27 PROCEDURE — 73140 X-RAY EXAM OF FINGER(S): CPT | Mod: RT

## 2022-07-27 NOTE — DISCHARGE INSTRUCTIONS
Take the following medications for pain/fever at home:  Acetaminophen (Tylenol): Take 650 mg (2 regular strength) every 6 hours. Do not take more than 3,000mg in a 24 hour period.   Ibuprofen: Take 400-600 mg (2-3 regular strength) every 6 hours. Take with food.   Alternate the two medications and you can take one of them every 3 hours.       Use the splint we have provided for comfort, or you can also maria isabel tape the finger to the finger next to it.  The symptoms should improve over the next 2 to 3 weeks.  Follow-up with orthopedics if they do not.  If the symptoms are improving you can take off the splint.

## 2022-07-27 NOTE — ED PROVIDER NOTES
"ED Provider Note    CHIEF COMPLAINT  Chief Complaint   Patient presents with   • Digit Pain     Right hand 5th finger pain after a GLF 7/24. Pt is able to move it, +swelling. Splint placed in triage.        HPI  Jhon Aguirre is a 57 y.o. male who presents with right fifth digit pain and deformity.  Patient states several days ago he tripped and fell landing on that finger which bent underneath him.  He reports he has had deformity and pain since that time.  States he had not been evaluated until now because the pain became worse tonight.  He denies any other trauma or injuries apart from the finger.  He is left-handed.    Separately, patient was seen at [place] 7/22 for the same complaint immediately after the fall.  The x-ray at that time demonstrated a fracture dislocation which was reduced with improved alignment.  He was then referred to hand surgery.  Patient denies any new falls since that time.  States he came here for second opinion because he was \"not happy with the answer given by the other hospital\".    REVIEW OF SYSTEMS  See HPI for further details. All other systems are negative.     PAST MEDICAL HISTORY   has a past medical history of Dental disorder, Pain, and Renal failure (2010).    SOCIAL HISTORY  Social History     Tobacco Use   • Smoking status: Current Every Day Smoker     Packs/day: 0.50     Years: 20.00     Pack years: 10.00     Types: Cigarettes   • Smokeless tobacco: Never Used   Vaping Use   • Vaping Use: Never used   Substance and Sexual Activity   • Alcohol use: Yes     Comment: once in a while    • Drug use: Yes     Comment: weed   • Sexual activity: Not on file       SURGICAL HISTORY   has a past surgical history that includes other; orif, fracture, radius and ulna (10/23/2011); and inguinal hernia repair (3/14/2014).    CURRENT MEDICATIONS  Home Medications     Reviewed by Lu Adamson R.N. (Registered Nurse) on 07/26/22 at 2314  Med List Status: Not Addressed " "  Medication Last Dose Status   folic acid (FOLVITE) 1 MG Tab  Active   multivitamin (THERAGRAN) Tab  Active   nicotine (NICODERM) 21 MG/24HR PATCH 24 HR  Active   omeprazole (PRILOSEC) 20 MG delayed-release capsule  Active   thiamine (THIAMINE) 100 MG tablet  Active                ALLERGIES  No Known Allergies    PHYSICAL EXAM  VITAL SIGNS: BP (!) 170/94   Pulse 94   Temp 36.4 °C (97.6 °F) (Temporal)   Resp 18   Ht 1.753 m (5' 9\")   Wt 56.6 kg (124 lb 12.5 oz)   SpO2 98%   BMI 18.43 kg/m²    Pulse ox interpretation: I interpret this pulse ox as normal.  Constitutional: Alert in no apparent distress.  HENT: Normocephalic, Atraumatic, Bilateral external ears normal. Nose normal.   Eyes: Pupils are equal and reactive. Conjunctiva normal, non-icteric.   Heart: Regular rate and rythm, no murmurs.    Lungs: Clear to auscultation bilaterally.  Skin: Warm, Dry, No erythema, No rash.   MSK: Right fifth digit with deformity and swelling at the PIP joint, intact distal sensation and cap refill, no tenderness of carpal bone, no swelling of hand or wrist or tenderness of that area.  Neurologic: Alert, Grossly non-focal.   Psychiatric: Affect normal, Judgment normal, Mood normal, Appears appropriate and not intoxicated.       DX-FINGER(S) 2+ RIGHT   Final Result         1.  Small finger middle phalanx base fracture.            COURSE & MEDICAL DECISION MAKING  Pertinent Labs & Imaging studies reviewed. (See chart for details)    57 y.o. male presented with right  5th finger pain after a mechanical fall several days prior with obvious deformity of the area of pain.. Normal pulses present distal to the injury and there were no open wounds to suggest open fracture or joint. Compartments were soft, no concern for compartment syndrome. Nerve function both motor and sensory was intact. X-ray demonstrated a minimally displaced fracture of the right fifth digit middle phalanx.  Joint does not appear to to be dislocated at this " time compared to x-ray 7/22/2022 at prior visit at [place] for the same complaint.  Patient was placed in a aluminum finger splint. Remained neurovascularly intact and pain well controlled after splinting. No other injuries were present on exam. Discharged home with return precautions.  It was reiterated that he needs to follow-up with hand surgery for repeat evaluation and to ensure good healing.    The patient will not drink alcohol nor drive with prescribed medications. The patient will return for worsening symptoms and is stable at the time of discharge. The patient verbalizes understanding and will comply.    FINAL IMPRESSION  1. Closed displaced fracture of middle phalanx of right little finger, initial encounter  Referral to Hand Surgery            Electronically signed by: Monik Ya M.D., 7/26/2022 11:57 PM

## 2022-07-27 NOTE — ED TRIAGE NOTES
"Chief Complaint   Patient presents with   • Digit Pain     Right hand 5th finger pain after a GLF 7/24. Pt is able to move it, +swelling. Splint placed in triage.      Pt ambulatory to triage for above complaint. Pt educated on triage process and informed to alert staff of any changes or concerns.     BP (!) 170/94   Pulse 94   Temp 36.4 °C (97.6 °F) (Temporal)   Resp 18   Ht 1.753 m (5' 9\")   Wt 56.6 kg (124 lb 12.5 oz)   SpO2 98%   BMI 18.43 kg/m²     "

## 2022-08-31 PROBLEM — S62.626A CLOSED DISPLACED FRACTURE OF MIDDLE PHALANX OF RIGHT LITTLE FINGER: Status: ACTIVE | Noted: 2022-08-31

## 2023-09-04 ENCOUNTER — HOSPITAL ENCOUNTER (EMERGENCY)
Facility: MEDICAL CENTER | Age: 58
End: 2023-09-04
Attending: EMERGENCY MEDICINE
Payer: MEDICAID

## 2023-09-04 VITALS
HEIGHT: 70 IN | OXYGEN SATURATION: 97 % | DIASTOLIC BLOOD PRESSURE: 83 MMHG | BODY MASS INDEX: 19.61 KG/M2 | SYSTOLIC BLOOD PRESSURE: 132 MMHG | WEIGHT: 137 LBS | RESPIRATION RATE: 18 BRPM | HEART RATE: 92 BPM | TEMPERATURE: 97.8 F

## 2023-09-04 DIAGNOSIS — F10.920 ALCOHOLIC INTOXICATION WITHOUT COMPLICATION (HCC): ICD-10-CM

## 2023-09-04 PROCEDURE — 99285 EMERGENCY DEPT VISIT HI MDM: CPT

## 2023-09-04 ASSESSMENT — FIBROSIS 4 INDEX: FIB4 SCORE: 2.87

## 2023-09-04 NOTE — ED NOTES
Report received from Raven MCKEON. Pt sleeping with visible rise and fall of chest. Responds to verbal stimuli. NAD. Will continue to monitor

## 2023-09-04 NOTE — ED NOTES
Pt amb to restroom with stumbling gait. Pt had a near fall.    Pt became verbally aggressive while in the restroom. This RN attempted to educated pt on his fall risk.  No evidence of learning observed.  Pt instructed to sit and urinate. Pt compliant.  Pt amb back to room with steady gait.  Pt provided food and water.

## 2023-09-04 NOTE — ED TRIAGE NOTES
"Chief Complaint   Patient presents with    Alcohol Intoxication     Patient bib ems from [employer] for acute alcohol intoxication, patient reports he only took three shots of vodka today. Patient reports extremity weakness. Ambulatory with assistance from EMS rWaldorf to ed [facility].      /87   Pulse 96   Temp 36.4 °C (97.6 °F) (Temporal)   Resp 18   Ht 1.778 m (5' 10\")   Wt 62.1 kg (137 lb)   SpO2 98%     "

## 2023-09-04 NOTE — ED NOTES
Bedside report given to Naf RN discussed orders, poc, fall risk precautions, oxygen use and patient vitals, removed self from care of patient.

## 2023-09-04 NOTE — ED PROVIDER NOTES
ED Provider Note    Scribed for Samuel Weems M.D. by Марина Doherty. 9/4/2023  12:28 PM    Primary care provider: Pcp Pt States None    CHIEF COMPLAINT  Chief Complaint   Patient presents with    Alcohol Intoxication     Patient bib ems from [facility] for acute alcohol intoxication, patient reports he only took three shots of vodka today. Patient reports extremity weakness. [place] [facility].      HPI    Jhon Aguirre is a 58 y.o. male who presents to the Emergency Department via ambulance for alcohol intoxication onset prior to arrival. The patient states that he has reports having palsy in his right hand. He reports that he did a detox program 9 months ago, and has no interest in doing a detox program again.  Patient denies vomiting, fever, cough, or diarrhea. He notes that he currently lives out on the street. No alleviating or exacerbating factors reported.  Patient denies any interest in detox and does not want to quit drinking alcohol.    EXTERNAL RECORDS REVIEWED  Office visit note reviewed from 8/31 for a finger fracture..     REVIEW OF SYSTEMS  Pertinent negatives include: vomiting,fever, cough,diarrhea.      PAST MEDICAL HISTORY  Past Medical History:   Diagnosis Date    Dental disorder     poor dentations  broken teeth    Pain     Renal failure 2010    Resolved, due to dehydration     SOCIAL HISTORY  Social History     Tobacco Use    Smoking status: Every Day     Current packs/day: 0.50     Average packs/day: 0.5 packs/day for 20.0 years (10.0 ttl pk-yrs)     Types: Cigarettes    Smokeless tobacco: Never   Vaping Use    Vaping Use: Never used   Substance Use Topics    Alcohol use: Yes     Comment: once in a while     Drug use: Yes     Comment: weed     Social History     Substance and Sexual Activity   Drug Use Yes    Comment: weed       SURGICAL HISTORY  Past Surgical History:   Procedure Laterality Date    INGUINAL HERNIA  "REPAIR  3/14/2014    Performed by David Ramirez M.D. at SURGERY SAME DAY [facility] ORS    ORIF, FRACTURE, RADIUS AND ULNA  10/23/2011    Performed by GERMAN REILLY at SURGERY [facility] ORS    OTHER      broken jaw       CURRENT MEDICATIONS  No current facility-administered medications for this encounter.    Current Outpatient Medications:     omeprazole (PRILOSEC) 20 MG delayed-release capsule, Take 1 Cap by mouth 2 Times a Day., Disp: 30 Cap, Rfl: 0    thiamine (THIAMINE) 100 MG tablet, Take 1 Tab by mouth every day., Disp: 30 Tab, Rfl: 0    multivitamin (THERAGRAN) Tab, Take 1 Tab by mouth every day., Disp: 30 Tab, Rfl: 0    folic acid (FOLVITE) 1 MG Tab, Take 1 Tab by mouth every day., Disp: 30 Tab, Rfl: 0    nicotine (NICODERM) 21 MG/24HR PATCH 24 HR, Apply 1 Patch to skin as directed every 24 hours., Disp: 30 Patch, Rfl: 0    ALLERGIES  No Known Allergies    PHYSICAL EXAM  VITAL SIGNS: /87   Pulse 96   Temp 36.4 °C (97.6 °F) (Temporal)   Resp 18   Ht 1.778 m (5' 10\")   Wt 62.1 kg (137 lb)   SpO2 98%   BMI 19.66 kg/m²   Reviewed and high normal blood pressure    Constitutional: Well developed, Well nourished, Intoxicated. Alert and oriented to history. Awoken by slight movement   HENT: Normocephalic, atraumatic, bilateral external ears normal, No intraoral erythema, edema, exudate  Eyes: PERRLA, conjunctiva pink, no scleral icterus.   Cardiovascular: Regular rate and rhythm. No murmurs, rubs or gallops.  No dependent edema or calf tenderness  Respiratory: Lungs clear to auscultation bilaterally. No wheezes, rales, or rhonchi.  Abdominal:  Abdomen soft, non-tender, non distended. No rebound, or guarding.    Skin: No erythema, no rash. No wounds or bruising.  Genitourinary: No costovertebral angle tenderness.   Musculoskeletal: no deformities.   Neurologic: Awakens, oriented to history, intoxicated, cranial nerves 2-12 intact by passive exam.  No focal deficit noted.  Psychiatric: " Jeremiah.    ED COURSE:  12:28 PM - Patient seen and examined at bedside.     ED Observation Status? Yes; Patient placed in observation status at 12:28 PM 09/04/23 for management of intoxication    INTERVENTIONS BY ME:  Patient given oral fluids and fed    2:41 -patient was walked to the bathroom and was unstable    3:54 PM - DC from ED observation.  Patient was able to walk at this time without signs of dislocation.  He had eaten part of a sandwich.    MEDICAL DECISION MAKING:  PROBLEMS EVALUATED THIS VISIT:    This patient presents with alcohol intoxication and abuse.  He needed observation here until he was safe for discharge.  Denies any trauma and there is no evidence of head injury.  There is no evidence of seizure or withdrawal.  There is no evidence of alcoholic ketoacidosis.  Unfortunately patient has no interest in alcohol detox program or quitting his abuse of alcohol.    RISK:  Moderate given lack of primary care and alcohol abuse     PLAN:  Discontinue all alcohol use  Consider inpatient alcohol detox    Problem alcohol use handout given    Followup:  [address] [address]  [phone]  Schedule an appointment as soon as possible for a visit   for a new primary      CONDITION: Stable.     FINAL IMPRESSION  1. Alcoholic intoxication without complication (HCC)       ED Observation Care     IМарина (Scribe), am scribing for, and in the presence of, Samuel Weems M.D..    Electronically signed by: Марина Doherty (Scribe), 9/4/2023    Samuel MULTANI M.D. personally performed the services described in this documentation, as scribed by Марина Doherty in my presence, and it is both accurate and complete.    The note accurately reflects work and decisions made by me.  Samuel Weems M.D.  9/4/2023  3:44 PM

## 2023-10-11 ENCOUNTER — APPOINTMENT (OUTPATIENT)
Dept: RADIOLOGY | Facility: MEDICAL CENTER | Age: 58
End: 2023-10-11
Attending: EMERGENCY MEDICINE
Payer: MEDICAID

## 2023-10-11 ENCOUNTER — APPOINTMENT (OUTPATIENT)
Dept: RADIOLOGY | Facility: MEDICAL CENTER | Age: 58
End: 2023-10-11
Payer: MEDICAID

## 2023-10-11 ENCOUNTER — HOSPITAL ENCOUNTER (EMERGENCY)
Facility: MEDICAL CENTER | Age: 58
End: 2023-10-11
Attending: EMERGENCY MEDICINE
Payer: MEDICAID

## 2023-10-11 VITALS
WEIGHT: 135 LBS | HEART RATE: 84 BPM | RESPIRATION RATE: 16 BRPM | HEIGHT: 70 IN | SYSTOLIC BLOOD PRESSURE: 137 MMHG | TEMPERATURE: 98.1 F | BODY MASS INDEX: 19.33 KG/M2 | OXYGEN SATURATION: 96 % | DIASTOLIC BLOOD PRESSURE: 84 MMHG

## 2023-10-11 DIAGNOSIS — T07.XXXA ABRASIONS OF MULTIPLE SITES: ICD-10-CM

## 2023-10-11 DIAGNOSIS — S50.01XA CONTUSION OF RIGHT ELBOW, INITIAL ENCOUNTER: ICD-10-CM

## 2023-10-11 PROCEDURE — 99283 EMERGENCY DEPT VISIT LOW MDM: CPT

## 2023-10-11 PROCEDURE — 90471 IMMUNIZATION ADMIN: CPT

## 2023-10-11 PROCEDURE — 90715 TDAP VACCINE 7 YRS/> IM: CPT | Performed by: EMERGENCY MEDICINE

## 2023-10-11 PROCEDURE — 73070 X-RAY EXAM OF ELBOW: CPT | Mod: RT

## 2023-10-11 PROCEDURE — 700111 HCHG RX REV CODE 636 W/ 250 OVERRIDE (IP): Performed by: EMERGENCY MEDICINE

## 2023-10-11 RX ADMIN — CLOSTRIDIUM TETANI TOXOID ANTIGEN (FORMALDEHYDE INACTIVATED), CORYNEBACTERIUM DIPHTHERIAE TOXOID ANTIGEN (FORMALDEHYDE INACTIVATED), BORDETELLA PERTUSSIS TOXOID ANTIGEN (GLUTARALDEHYDE INACTIVATED), BORDETELLA PERTUSSIS FILAMENTOUS HEMAGGLUTININ ANTIGEN (FORMALDEHYDE INACTIVATED), BORDETELLA PERTUSSIS PERTACTIN ANTIGEN, AND BORDETELLA PERTUSSIS FIMBRIAE 2/3 ANTIGEN 0.5 ML: 5; 2; 2.5; 5; 3; 5 INJECTION, SUSPENSION INTRAMUSCULAR at 22:12

## 2023-10-11 ASSESSMENT — FIBROSIS 4 INDEX: FIB4 SCORE: 2.87

## 2023-10-12 NOTE — ED NOTES
Printed AVS and gave discharge information and paperwork to patient. ERP informed the patient and all patient's questions were answered. Patient was discharged from ORTHO 01 to the ED front lobby in stable condition.

## 2023-10-12 NOTE — ED PROVIDER NOTES
ER Provider Note    Scribed for Dr. Antoine Jones M.D. by Alesha Park. 10/11/2023  9:07 PM    Primary Care Provider: Pcp Pt States None    CHIEF COMPLAINT  Chief Complaint   Patient presents with    Elbow Injury     BIBA for elbow injury. Pt fell and landed on rt elbow. Skin tear to rt elbow. Bleeding controlled. Denies hitting head today. -LOC. Denies dizziness. +ETOH, admits to 1 pt.       EXTERNAL RECORDS REVIEWED  Outpatient Notes The patient was seen for a finger fracture. He has history of stroke.    HPI/ROS    Jhon Aguirre is a 58 y.o. male who has history of stroke presents to the ED via EMS for evaluation of a right elbow injury onset earlier today. He describes that he tripped, fell straight onto his right arm and hit his right elbow. The patient denies any loss of consciousness or dizziness. He admits to consuming about 1 pint of alcohol today. The patient mentions has not been able to move his fingers from his right hand for about 9 months. No alleviating or exacerbating factors noted.     PAST MEDICAL HISTORY  Past Medical History:   Diagnosis Date    Dental disorder     poor dentations  broken teeth    Pain     Renal failure 2010    Resolved, due to dehydration     SURGICAL HISTORY  Past Surgical History:   Procedure Laterality Date    INGUINAL HERNIA REPAIR  3/14/2014    Performed by David Ramirez M.D. at SURGERY SAME DAY [facility]    ORIF, FRACTURE, RADIUS AND ULNA  10/23/2011    Performed by GERMAN REILLY at SURGERY [facility] ORS    OTHER      broken jaw     FAMILY HISTORY  History reviewed. No pertinent family history.    SOCIAL HISTORY   reports that he has been smoking cigarettes. He has a 10.0 pack-year smoking history. He has never used smokeless tobacco. He reports current alcohol use. He reports that he does not currently use drugs.    CURRENT MEDICATIONS  Previous Medications    FOLIC ACID (FOLVITE) 1 MG TAB    Take 1 Tab by mouth every day.     "MULTIVITAMIN (THERAGRAN) TAB    Take 1 Tab by mouth every day.    NICOTINE (NICODERM) 21 MG/24HR PATCH 24 HR    Apply 1 Patch to skin as directed every 24 hours.    OMEPRAZOLE (PRILOSEC) 20 MG DELAYED-RELEASE CAPSULE    Take 1 Cap by mouth 2 Times a Day.    THIAMINE (THIAMINE) 100 MG TABLET    Take 1 Tab by mouth every day.     ALLERGIES  Patient has no known allergies.    PHYSICAL EXAM  BP (!) 162/113   Pulse (!) 103   Temp 36.2 °C (97.2 °F) (Temporal)   Resp 18   Ht 1.778 m (5' 10\")   Wt 61.2 kg (135 lb)   SpO2 99%   BMI 19.37 kg/m²   Constitutional: Alert in no apparent distress.  HENT: No signs of trauma, Bilateral external ears normal, Nose normal.   Eyes: Pupils are equal and reactive, Conjunctiva normal, Non-icteric.   Neck: Normal range of motion, No tenderness, Supple, No stridor.   Lymphatic: No lymphadenopathy noted.   Cardiovascular: Regular rate and rhythm, no murmurs.   Thorax & Lungs: Normal breath sounds, No respiratory distress, No wheezing, No chest tenderness.   Abdomen: Bowel sounds normal, Soft, No tenderness, No masses, No pulsatile masses. No peritoneal signs.  Skin: Warm, Dry, No erythema, No rash.   Back: No bony tenderness, No CVA tenderness.   Extremities: 2 superficial wounds to the right elbow, both 1 cm in length. Looks to be old ulnar neuropathy on the right hand. The patient is contracted in the entire right upper extremity. Intact distal pulses, No edema, No cyanosis.   Musculoskeletal: Good range of motion in all major joints. No tenderness to palpation or major deformities noted.   Neurologic: Alert , Normal motor function, Normal sensory function, No focal deficits noted.   Psychiatric: Affect normal, Judgment normal, Mood normal.     DIAGNOSTIC STUDIES & PROCEDURES    Radiology:   The attending Emergency Physician has independently interpreted the diagnostic imaging associated with this visit and is awaiting the final reading from the radiologist, which will be displayed " below.    Preliminary interpretation is a follows: No obvious fracture noted in the patient's elbow  Radiologist interpretation:      DX-ELBOW-LIMITED 2- RIGHT   Final Result      Diffuse osteopenia with appearance suspicious for myeloma         COURSE & MEDICAL DECISION MAKING    ED Observation Status? No; Patient does not meet criteria for ED Observation.     INITIAL ASSESSMENT AND PLAN  Care Narrative:       9:07 PM - Patient seen and evaluated at bedside. Discussed plan of care, including performing imaging. Patient agrees to plan of care. Patient will be treated with Adacel 0.5 mL for his symptoms. Ordered DX-elbow (Right) to evaluate.    10:22 PM - Patient was reevaluated at bedside. Discussed radiology results with the patient and informed them that it showed diffuse osteopenia with concern for myeloma. I referred the patient to [facility]. Discussed discharge instructions and return precautions with the patient and they were cleared for discharge. Patient was given the opportunity to ask any further questions. He is comfortable with discharge at this time.       ADDITIONAL PROBLEM LIST AND DISPOSITION   Patient with fall striking his right elbow.  He says this was a mechanical fall secondary to intoxication and tripping.  Ultimately he has an abrasion on his elbow.  We will update his tetanus status.  We will clean up the wound.  X-rays performed shows no fracture.  We will discharge him home with strict return precautions and follow-up.               DISPOSITION AND DISCUSSIONS  I have discussed management of the patient with the following physicians and ARELIS's: None.    Discussion of management with other Q or appropriate source(s): None     Escalation of care considered, and ultimately not performed: blood analysis.    Barriers to care at this time, including but not limited to: Patient does not have established PCP.     Decision tools and prescription drugs considered including, but not limited to:  Medication modification no antibiotics needed for home. .    The patient will return for new or worsening symptoms and is stable at the time of discharge.    DISPOSITION:  Patient will be discharged home in stable condition.    FOLLOW UP:  [facility] Emergency Department  [address] [facility] [address]  [phone]        FINAL IMPRESSION   1. Contusion of right elbow, initial encounter    2. Abrasions of multiple sites      IAlesha (Scribe), am scribing for, and in the presence of, Antoine Jones M.D..    Electronically signed by: Alesha Park (Scribe), 10/11/2023    IAntoine M.D. personally performed the services described in this documentation, as scribed by Alesha Park in my presence, and it is both accurate and complete.    The note accurately reflects work and decisions made by me.  Antoine Jones M.D.  10/12/2023  3:23 AM

## 2023-10-12 NOTE — ED TRIAGE NOTES
"Chief Complaint   Patient presents with    Elbow Injury     BIBA for elbow injury. Pt fell and landed on rt elbow. Skin tear to rt elbow. Bleeding controlled. Denies hitting head today. -LOC. Denies dizziness. +ETOH, admits to 1 pt.     BP (!) 162/113   Pulse (!) 103   Temp 36.2 °C (97.2 °F) (Temporal)   Resp 18   Ht 1.778 m (5' 10\")   Wt 61.2 kg (135 lb)   SpO2 99%   BMI 19.37 kg/m²     Pt states he is supposed to be on psych meds but is not taking them. Multiple falls lately.    Pt speaking, unlabored, in full sentences. Pt placed in lobby, educated on triage process, and told to notify staff of any change in status. No acute distress noted.    "

## 2023-10-12 NOTE — ED NOTES
Patient taken to ORTHO 01 by wheelchair. Patient connected to BP cuff and pulse oximeter. Chart up for ERP.

## 2023-11-06 ENCOUNTER — APPOINTMENT (OUTPATIENT)
Dept: RADIOLOGY | Facility: MEDICAL CENTER | Age: 58
End: 2023-11-06
Attending: EMERGENCY MEDICINE
Payer: MEDICAID

## 2023-11-06 ENCOUNTER — HOSPITAL ENCOUNTER (EMERGENCY)
Facility: MEDICAL CENTER | Age: 58
End: 2023-11-06
Attending: EMERGENCY MEDICINE
Payer: MEDICAID

## 2023-11-06 VITALS
WEIGHT: 135 LBS | SYSTOLIC BLOOD PRESSURE: 130 MMHG | TEMPERATURE: 99.1 F | OXYGEN SATURATION: 94 % | HEART RATE: 77 BPM | HEIGHT: 70 IN | RESPIRATION RATE: 20 BRPM | DIASTOLIC BLOOD PRESSURE: 79 MMHG | BODY MASS INDEX: 19.33 KG/M2

## 2023-11-06 DIAGNOSIS — F10.920 ALCOHOLIC INTOXICATION WITHOUT COMPLICATION (HCC): ICD-10-CM

## 2023-11-06 DIAGNOSIS — S02.2XXA CLOSED FRACTURE OF NASAL BONE, INITIAL ENCOUNTER: ICD-10-CM

## 2023-11-06 DIAGNOSIS — S09.90XA CLOSED HEAD INJURY, INITIAL ENCOUNTER: ICD-10-CM

## 2023-11-06 LAB — POC BREATHALIZER: 0.25 PERCENT (ref 0–0.01)

## 2023-11-06 PROCEDURE — 72125 CT NECK SPINE W/O DYE: CPT

## 2023-11-06 PROCEDURE — 99285 EMERGENCY DEPT VISIT HI MDM: CPT

## 2023-11-06 PROCEDURE — 302970 POC BREATHALIZER: Performed by: EMERGENCY MEDICINE

## 2023-11-06 PROCEDURE — A9270 NON-COVERED ITEM OR SERVICE: HCPCS | Mod: UD | Performed by: EMERGENCY MEDICINE

## 2023-11-06 PROCEDURE — 70486 CT MAXILLOFACIAL W/O DYE: CPT

## 2023-11-06 PROCEDURE — 70450 CT HEAD/BRAIN W/O DYE: CPT

## 2023-11-06 PROCEDURE — 700102 HCHG RX REV CODE 250 W/ 637 OVERRIDE(OP): Mod: UD | Performed by: EMERGENCY MEDICINE

## 2023-11-06 RX ORDER — ACETAMINOPHEN 325 MG/1
650 TABLET ORAL ONCE
Status: COMPLETED | OUTPATIENT
Start: 2023-11-06 | End: 2023-11-06

## 2023-11-06 RX ADMIN — ACETAMINOPHEN 650 MG: 325 TABLET ORAL at 17:59

## 2023-11-06 ASSESSMENT — LIFESTYLE VARIABLES
DO YOU DRINK ALCOHOL: YES
DOES PATIENT WANT TO STOP DRINKING: NO

## 2023-11-06 NOTE — ED PROVIDER NOTES
CHIEF COMPLAINT  Chief Complaint   Patient presents with    T-5000 Head Injury    Alcohol Intoxication       LIMITATION TO HISTORY   Select: none    HPI    Jhon Aguirre is a 58 y.o. male who presents to the Emergency Department via EMS for evaluation of a head injury onset earlier today. The patient explains that he fell down after being intoxicated and hurt his nose and neck. The patient has multiple small abrasions to his head and face. He currently has a c-collar in place. He has associated pain to his neck and face. There are no known alleviating or exacerbating factors.  The patient denies having pain anywhere other than his head.     OUTSIDE HISTORIAN(S):  Select: None    EXTERNAL RECORDS REVIEWED  Select: The patient was seen here 10/11/23 for a elbow injury following a fall while intoxicated.    PAST MEDICAL HISTORY  Past Medical History:   Diagnosis Date    Dental disorder     poor dentations  broken teeth    Pain     Renal failure 2010    Resolved, due to dehydration     .    SURGICAL HISTORY  Past Surgical History:   Procedure Laterality Date    INGUINAL HERNIA REPAIR  3/14/2014    Performed by David Ramirez M.D. at SURGERY SAME DAY [facility] ORS    ORIF, FRACTURE, RADIUS AND ULNA  10/23/2011    Performed by GERMAN REILLY at SURGERY [facility] ORS    OTHER      broken jaw         FAMILY HISTORY  History reviewed. No pertinent family history.       SOCIAL HISTORY  Social History     Tobacco Use    Smoking status: Every Day     Current packs/day: 0.50     Average packs/day: 0.5 packs/day for 20.0 years (10.0 ttl pk-yrs)     Types: Cigarettes    Smokeless tobacco: Never   Vaping Use    Vaping Use: Never used   Substance Use Topics    Alcohol use: Yes     Comment: daily    Drug use: Not Currently     Comment: weed         CURRENT MEDICATIONS  No current facility-administered medications on file prior to encounter.     Current Outpatient Medications on File Prior to Encounter   Medication  "Sig Dispense Refill    omeprazole (PRILOSEC) 20 MG delayed-release capsule Take 1 Cap by mouth 2 Times a Day. 30 Cap 0    thiamine (THIAMINE) 100 MG tablet Take 1 Tab by mouth every day. 30 Tab 0    multivitamin (THERAGRAN) Tab Take 1 Tab by mouth every day. 30 Tab 0    folic acid (FOLVITE) 1 MG Tab Take 1 Tab by mouth every day. 30 Tab 0    nicotine (NICODERM) 21 MG/24HR PATCH 24 HR Apply 1 Patch to skin as directed every 24 hours. 30 Patch 0         ALLERGIES  No Known Allergies    PHYSICAL EXAM  VITAL SIGNS:BP (!) 165/71   Pulse 74   Temp 36.4 °C (97.6 °F) (Temporal)   Resp 20   Ht 1.778 m (5' 10\")   Wt 61.2 kg (135 lb)   SpO2 94%   BMI 19.37 kg/m²       Constitutional: Well-developed no acute distress, slurring and smells of alcohol.  HENT: Normocephalic, Tenderness to left maxillary region, Bilateral external ears normal.  Eyes:  conjunctiva are normal.   Neck: C- collar in place, Supple.  Cardiovascular: Regular rate and rhythm without murmurs gallops or rubs.   Thorax & Lungs: No respiratory distress. Breathing comfortably. Lungs are clear to auscultation bilaterally, there are no wheezes no rales. Chest wall is nontender.  Abdomen: Soft, non distended, non tender   Skin: Abrasions to scalp, small non surgical narrow laceration to right maxillary region.  Back: Tenderness to paraspinal and midline region, No CVA tenderness.  Musculoskeletal:  No clubbing cyanosis or edema good range of motion   Neurologic: Alert & oriented x 3, normal sensation moving all extremities appears normal   Psychiatric: Affect normal, Judgment normal, Mood normal.     DIAGNOSTIC STUDIES / PROCEDURES    LABS  Results for orders placed or performed during the hospital encounter of 11/06/23   POC BREATHALIZER   Result Value Ref Range    POC Breathalizer 0.249 (A) 0.00 - 0.01 Percent         RADIOLOGY  I have independently interpreted the diagnostic imaging associated with this visit and am waiting the final reading from the " radiologist.   My preliminary interpretation is as follows: CT of the head shows no acute intracerebral hemorrhage he went to [place]      Radiologist interpretation:   CT-CSPINE WITHOUT PLUS RECONS   Final Result      Degenerative changes of the cervical spine without acute fracture or malalignment.      CT-MAXILLOFACIAL W/O PLUS RECONS   Final Result      1.  Nondisplaced nasal bone fractures.      CT-HEAD W/O   Final Result      1.  Cerebral atrophy.      2. Moderate sized area of encephalomalacia in the left parietal lobe.              COURSE & MEDICAL DECISION MAKING    ED COURSE:    ED Observation Status? Yes;I am placing the patient in to an observation status due to a diagnostic uncertainty as well as therapeutic intensity. Patient placed in observation status at 3:09 PM 11/6/2023    Observation plan is as follows: Monitor for symptom management and diagnostic results.     INTERVENTIONS BY ME:  Medications   acetaminophen (Tylenol) tablet 650 mg (650 mg Oral Given 11/6/23 1759)       3:09 PM - Patient seen and examined in keyes bed. He presents to the ED for evaluation following a fall earlier today. He has multiple abrasions to head and face. The patient reports he was intoxicated at the time of the incident. Discussed plan of care, including imaging and lab work. Patient agrees to the plan of care. Ordered for CT- Maxillofacial w/o, CT- Head w/o, and CT-Cspine w/o, and POC breathalyzer to evaluate his symptoms.     Rechecks patient was rechecked at 6:00 PM is improved able to ambulate without assistance.    Upon Reevaluation, the patient's condition has: Improved; and will be discharged.  Patient discharged from ED observation at 6:18 PM 11/06/23  .      INITIAL ASSESSMENT, COURSE AND PLAN  Care Narrative: Patient presents emerged department intoxicated with what appears to be a head injury and facial injury.  CT scans were done of the head face and neck there is no signs of other fractures except for his  nose which is a minimally displaced nasal fracture.  At this point I have recommended for the patient to follow-up primary care physician for recheck there is no signs of significant functional abnormality so I do not feel that surgery is necessary at this time is more cosmetic I recommend a follow-up with the plastic surgeon as needed however the patient does not have money and states that he will not follow-up with a plastic surgeon.  At this point when the patient is clinically sober he is able to be discharged.       ADDITIONAL PROBLEM LIST  Alcoholism    DISPOSITION AND DISCUSSIONS  I have discussed management of the patient with the following physicians and ARELIS's: Behavioral health for assistance for shelter      Escalation of care considered, and ultimately not performed: Considered for admission if the patient had any signs of intercerebral bleed or significant fracture however CT scan showed no fractures at this point I do feel the patient is stable for discharge.    Barriers to care at this time, including but not limited to: Patient does not have established PCP, Patient is homeless, and Patient lacks transportation .     Decision tools and prescription drugs considered including, but not limited to: Pain medications were considered however this point I do recommend OTC medications..         The patient will return for new or worsening symptoms and is stable at the time of discharge.    The patient is referred to a primary physician for blood pressure management, diabetic screening, and for all other preventative health concerns.    I reviewed prescription monitoring program for patient's narcotic use before prescribing a scheduled drug.The patient will not drink alcohol nor drive with prescribed medications        DISPOSITION:  Patient will be discharged home in stable condition.    FOLLOW UP:  [facility] - Primary Care and Family Medicine  [address] [place] [address]  [address]  [phone]        [facility] - Behavioral Health Counseling  [address] [facility] [address]  [phone]          OUTPATIENT MEDICATIONS:  Discharge Medication List as of 11/6/2023  6:11 PM                         FINAL DIAGNOSIS  1. Closed fracture of nasal bone, initial encounter    2. Closed head injury, initial encounter    3. Alcoholic intoxication without complication (HCC)         Antoinette MULTANI (Scribe), am scribing for, and in the presence of, Joe Tarango M.D..    Electronically signed by: Antoinette Berry (Scribe), 11/6/2023    IJoe M.D. personally performed the services described in this documentation, as scribed by Antoinette Berry in my presence, and it is both accurate and complete.     Electronically signed by: Joe Tarango M.D.,7:05 PM 11/06/23

## 2023-11-06 NOTE — ED NOTES
Pt ambulated to bathroom with RN. Gait unsteady.   He refused ED RN help but did wash around face and nose.

## 2023-11-06 NOTE — ED TRIAGE NOTES
Patient to ED with complaints of GLF with injury to nose. He is intoxicated and combative with nurse staff. He is not answering questions, but EMS reported him to be oriented x2 and non ambulatory. He is refusing to change into a hospital gown. Collar in place by EMS

## 2023-11-07 NOTE — ED NOTES
Pt has been provided written and verbal education on nasal bone fracture. He has been instructed to follow up with primary care in a week. He verbalized understanding.   [facility] has been called and [facility] will be here in 20 minutes for transport to [facility].

## 2023-11-26 ENCOUNTER — APPOINTMENT (OUTPATIENT)
Dept: RADIOLOGY | Facility: MEDICAL CENTER | Age: 58
End: 2023-11-26
Attending: EMERGENCY MEDICINE
Payer: MEDICAID

## 2023-11-26 ENCOUNTER — HOSPITAL ENCOUNTER (EMERGENCY)
Facility: MEDICAL CENTER | Age: 58
End: 2023-11-26
Attending: EMERGENCY MEDICINE
Payer: MEDICAID

## 2023-11-26 VITALS
WEIGHT: 135 LBS | SYSTOLIC BLOOD PRESSURE: 118 MMHG | OXYGEN SATURATION: 96 % | BODY MASS INDEX: 19.37 KG/M2 | HEART RATE: 89 BPM | RESPIRATION RATE: 16 BRPM | DIASTOLIC BLOOD PRESSURE: 84 MMHG | TEMPERATURE: 98 F

## 2023-11-26 DIAGNOSIS — F10.921 ALCOHOL INTOXICATION WITH DELIRIUM (HCC): ICD-10-CM

## 2023-11-26 DIAGNOSIS — S09.90XA CLOSED HEAD INJURY, INITIAL ENCOUNTER: ICD-10-CM

## 2023-11-26 PROCEDURE — 70450 CT HEAD/BRAIN W/O DYE: CPT

## 2023-11-26 PROCEDURE — 99284 EMERGENCY DEPT VISIT MOD MDM: CPT | Mod: 25

## 2023-11-26 ASSESSMENT — LIFESTYLE VARIABLES
DOES PATIENT WANT TO STOP DRINKING: NO
DO YOU DRINK ALCOHOL: YES

## 2023-11-26 NOTE — ED PROVIDER NOTES
ER Provider Note    Scribed for Jackson Bhat M.D. by Antoinette Berry. 11/26/2023   2:15 PM    Primary Care Provider: Pcp Pt States None    CHIEF COMPLAINT  Chief Complaint   Patient presents with    Alcohol Intoxication     Pt BiB [place] after glf with small lac to R lateral eye     EXTERNAL RECORDS REVIEWED  Inpatient Notes The patient was last seen here 11/6/23 for a head injury resulting from a fall while intoxicated.    HPI/ROS  LIMITATION TO HISTORY   Select: : None  OUTSIDE HISTORIAN(S):  None    Jhon Aguirre is a 58 y.o. male with a history of alcohol abuse who presents to the ED via EMS for evaluation of altered mental status onset earlier today.   The patient explains that he fell and hit his head pretty hard resulting in a laceration to his left temple. He notes that he cannot remember what caused him to fall. Currently in the ED, the patient's right arm is in a sling and he explains that he cannot use it due to a previous stroke. The patient also has a small scrape on the 3rd finger of his right hand. No alleviating or exacerbating factors were noted. The patient denies having any neck pain. The patient does not have any known allergies to medications.    PAST MEDICAL HISTORY  Past Medical History:   Diagnosis Date    Dental disorder     poor dentations  broken teeth    Pain     Renal failure 2010    Resolved, due to dehydration       SURGICAL HISTORY  Past Surgical History:   Procedure Laterality Date    INGUINAL HERNIA REPAIR  3/14/2014    Performed by David Ramirez M.D. at SURGERY SAME DAY [facility] ORS    ORIF, FRACTURE, RADIUS AND ULNA  10/23/2011    Performed by GERMAN REILLY at SURGERY [facility] ORS    OTHER      broken jaw       FAMILY HISTORY  No family history pertinent.    SOCIAL HISTORY   reports that he has been smoking cigarettes. He has a 10.0 pack-year smoking history. He has never used smokeless tobacco. He reports current alcohol use. He reports that he does not  currently use drugs.    CURRENT MEDICATIONS  Current Outpatient Medications   Medication Instructions    folic acid (FOLVITE) 1 mg, Oral, DAILY    multivitamin (THERAGRAN) Tab 1 Tablet, Oral, DAILY    nicotine (NICODERM) 21 MG/24HR PATCH 24 HR 1 Patch, Transdermal, EVERY 24 HOURS    omeprazole (PRILOSEC) 20 mg, Oral, 2 TIMES DAILY    thiamine (THIAMINE) 100 mg, Oral, DAILY      ALLERGIES  No Known Allergies     PHYSICAL EXAM  BP (!) 173/104   Pulse 96   Temp 36.4 °C (97.6 °F) (Temporal)   Resp 18   Wt 61.2 kg (135 lb)   SpO2 96%   BMI 19.37 kg/m²      Constitutional: Well developed, Well nourished, Mild distress, Disheveled and smelly.  HENT: Abrasion/ laceration above left eyebrow, Normocephalic.  Eyes: PERRLA, EOMI, Conjunctiva normal, No discharge.   Neck: No tenderness, Supple, No stridor.   Cardiovascular: Normal heart rate, Normal rhythm.   Thorax & Lungs: Clear to auscultation bilaterally, No respiratory distress, No wheezing, No crackles.   Abdomen: Soft, No tenderness, No masses, No pulsatile masses.   Skin: Warm, Dry, No erythema, No rash.    Musculoskeletal: No major deformities noted.  Intact distal pulses  Neurologic: Awake, alert. Moves all extremities spontaneously.  Psychiatric: Affect normal, Judgment normal, Mood normal.      DIAGNOSTIC STUDIES    Radiology:   This attending emergency physician has independently interpreted the diagnostic imaging associated with this visit and is awaiting the final reading from the radiologist.   Preliminary interpretation is a follows: No bleeding  Radiologist interpretation:   CT-HEAD W/O   Final Result      1.  No acute intracranial abnormality      2.  Cerebral volume loss, white matter change, and left hemispheric encephalomalacia              COURSE & MEDICAL DECISION MAKING     ED Observation Status? Yes; I am placing the patient in to an observation status due to a diagnostic uncertainty as well as therapeutic intensity. Patient placed in observation  status at 2:17 PM, 11/26/2023.     Observation plan is as follows: Monitor for symptom management and diagnostic results.     Upon Reevaluation, the patient's condition has: Improved; and will be discharged.    Patient discharged from ED Observation status at 4:39 PM (Time) 11/26/2023 (Date).     INITIAL ASSESSMENT, COURSE AND PLAN    Care Narrative: Patient with alcohol intoxication, head injury.  CT scan of the head was negative, monitor the patient here until the patient was stable on his feet and the patient will be discharged home.    2:15 PM - Patient was evaluated at bedside for alcohol intoxication and a head injury. Ordered for CT-Head w/o to evaluate. Patient verbalizes understanding and support with my plan of care.     4:23 PM - Patient reevaluated at bedside. He feels improved. I discussed the patient's diagnostic study results which shows no acute abnormalities. Discussed plan for discharge, including plan for follow-up, and informed them to return to the [facility] ED with any new or worsening symptoms. Patient was given the opportunity for questions, and I addressed all questions or concerns. He is stable for discharge at this time. Patient verbalizes understanding and support with my plan for discharge.    HTN/IDDM FOLLOW UP:  The patient is referred to a primary physician for blood pressure management, diabetic screening, and for all other preventive health concerns    DISPOSITION AND DISCUSSIONS    I have discussed management of the patient with the following physicians and ARELIS's:  None    Discussion of management with other QHP or appropriate source(s): Radiologist Reviewed imaging      Escalation of care considered, and ultimately not performed: blood analysis.    Barriers to care at this time, including but not limited to: Patient does not have established PCP.     The patient will return for new or worsening symptoms and is stable at the time of discharge.    The patient is referred to a primary  physician for blood pressure management, diabetic screening, and for all other preventative health concerns.    DISPOSITION:  Patient will be discharged home in stable condition.    FOLLOW UP:  [facility], Emergency Dept  [address] [id number] [phone]    If symptoms worsen    FINAL DIAGNOSIS  1. Alcohol intoxication with delirium (HCC)    2. Closed head injury, initial encounter         Antoinette MULTANI (Scribe), am scribing for, and in the presence of, Jackson Bhat M.D..    Electronically signed by: Antoinette Berry (Scribe), 11/26/2023    Jackson MULTANI M.D. personally performed the services described in this documentation, as scribed by Antoinette Berry in my presence, and it is both accurate and complete.      The note accurately reflects work and decisions made by me.  Jackson Bhat M.D.  11/26/2023  7:14 PM

## 2023-11-26 NOTE — ED TRIAGE NOTES
Chief Complaint   Patient presents with    Alcohol Intoxication     Pt BiB [place] after glf with small lac to R lateral eye     Bed alarm in place.  Pt belligerent and using foul expletives to EMS and nursing staff.

## 2023-12-01 ENCOUNTER — HOSPITAL ENCOUNTER (EMERGENCY)
Facility: MEDICAL CENTER | Age: 58
End: 2023-12-02
Attending: STUDENT IN AN ORGANIZED HEALTH CARE EDUCATION/TRAINING PROGRAM
Payer: MEDICAID

## 2023-12-01 DIAGNOSIS — K04.7 DENTAL ABSCESS: ICD-10-CM

## 2023-12-01 PROCEDURE — 99283 EMERGENCY DEPT VISIT LOW MDM: CPT

## 2023-12-01 PROCEDURE — 99406 BEHAV CHNG SMOKING 3-10 MIN: CPT

## 2023-12-02 ENCOUNTER — PHARMACY VISIT (OUTPATIENT)
Dept: PHARMACY | Facility: MEDICAL CENTER | Age: 58
End: 2023-12-02
Payer: COMMERCIAL

## 2023-12-02 VITALS
HEIGHT: 70 IN | DIASTOLIC BLOOD PRESSURE: 89 MMHG | WEIGHT: 122.58 LBS | TEMPERATURE: 98.4 F | SYSTOLIC BLOOD PRESSURE: 122 MMHG | HEART RATE: 78 BPM | RESPIRATION RATE: 18 BRPM | BODY MASS INDEX: 17.55 KG/M2 | OXYGEN SATURATION: 95 %

## 2023-12-02 PROCEDURE — RXMED WILLOW AMBULATORY MEDICATION CHARGE: Performed by: STUDENT IN AN ORGANIZED HEALTH CARE EDUCATION/TRAINING PROGRAM

## 2023-12-02 PROCEDURE — A9270 NON-COVERED ITEM OR SERVICE: HCPCS | Mod: UD | Performed by: STUDENT IN AN ORGANIZED HEALTH CARE EDUCATION/TRAINING PROGRAM

## 2023-12-02 PROCEDURE — 700102 HCHG RX REV CODE 250 W/ 637 OVERRIDE(OP): Mod: UD | Performed by: STUDENT IN AN ORGANIZED HEALTH CARE EDUCATION/TRAINING PROGRAM

## 2023-12-02 RX ORDER — HYDROCODONE BITARTRATE AND ACETAMINOPHEN 5; 325 MG/1; MG/1
1 TABLET ORAL ONCE
Status: COMPLETED | OUTPATIENT
Start: 2023-12-02 | End: 2023-12-02

## 2023-12-02 RX ORDER — AMOXICILLIN AND CLAVULANATE POTASSIUM 875; 125 MG/1; MG/1
1 TABLET, FILM COATED ORAL ONCE
Status: COMPLETED | OUTPATIENT
Start: 2023-12-02 | End: 2023-12-02

## 2023-12-02 RX ORDER — AMOXICILLIN AND CLAVULANATE POTASSIUM 875; 125 MG/1; MG/1
1 TABLET, FILM COATED ORAL 2 TIMES DAILY
Qty: 20 TABLET | Refills: 0 | Status: ACTIVE | OUTPATIENT
Start: 2023-12-02

## 2023-12-02 RX ADMIN — AMOXICILLIN AND CLAVULANATE POTASSIUM 1 TABLET: 875; 125 TABLET, FILM COATED ORAL at 01:18

## 2023-12-02 RX ADMIN — HYDROCODONE BITARTRATE AND ACETAMINOPHEN 1 TABLET: 5; 325 TABLET ORAL at 01:18

## 2023-12-02 ASSESSMENT — PAIN DESCRIPTION - PAIN TYPE: TYPE: ACUTE PAIN

## 2023-12-02 NOTE — ED PROVIDER NOTES
"ED Provider Note    CHIEF COMPLAINT  Chief Complaint   Patient presents with    Dental Pain     BIBA from [facility] for c/o L sided lower dental pain. Pt w swelling to L side of jaw. Pt able to manage oral secretions, airway patent.        EXTERNAL RECORDS REVIEWED  External ED Note ED visit 11/26/2023 for alcohol intoxication.  Patient was seen and evaluated under observation status.  He was discharged once clinically sober.    HPI/ROS  LIMITATION TO HISTORY   Select: : None  OUTSIDE HISTORIAN(S):  None    Jhon Aguirre is a 58 y.o. male who presents from [facility] with complaint of dental pain and swelling.  Patient notes a dental infection to the left lower jaw.  He has noted symptoms for 6 days.  He was initially taking aspirin with pain relief but is now no longer subsiding with aspirin.  Does note he has poor dentition has not seen a dentist in quite some time.  He notes no trouble with swallowing, no trismus, no change in his voice, no fevers.  No recent fractures or injuries to the mouth or teeth.  He has had abscesses in the past and notes that he \"pops them\".  He is concerned that he needs antibiotics.    PAST MEDICAL HISTORY   has a past medical history of Dental disorder, Pain, and Renal failure (2010).    SURGICAL HISTORY   has a past surgical history that includes other; orif, fracture, radius and ulna (10/23/2011); and inguinal hernia repair (3/14/2014).    FAMILY HISTORY  History reviewed. No pertinent family history.    SOCIAL HISTORY  Social History     Tobacco Use    Smoking status: Every Day     Current packs/day: 0.50     Average packs/day: 0.5 packs/day for 20.0 years (10.0 ttl pk-yrs)     Types: Cigarettes    Smokeless tobacco: Never   Vaping Use    Vaping Use: Never used   Substance and Sexual Activity    Alcohol use: Yes     Comment: occ.    Drug use: Not Currently     Comment: past mj use    Sexual activity: Not on file       CURRENT MEDICATIONS  Home Medications       " "Reviewed by Ann Holland R.N. (Registered Nurse) on 12/01/23 at 2253  Med List Status: Not Addressed     Medication Last Dose Status   folic acid (FOLVITE) 1 MG Tab  Active   multivitamin (THERAGRAN) Tab  Active   nicotine (NICODERM) 21 MG/24HR PATCH 24 HR  Active   omeprazole (PRILOSEC) 20 MG delayed-release capsule  Active   thiamine (THIAMINE) 100 MG tablet  Active                    ALLERGIES  No Known Allergies    PHYSICAL EXAM  VITAL SIGNS: /86   Pulse 95   Temp 37.1 °C (98.7 °F) (Temporal)   Resp 16   Ht 1.778 m (5' 10\")   Wt 55.6 kg (122 lb 9.2 oz)   SpO2 96%   BMI 17.59 kg/m²    Constitutional: Awake and alert. No acute distress.  Head: NCAT.  HEENT: Normal Conjunctiva. PERRLA.  Poor dentition.  There is an area of gingival fluctuance and erythema.  It is tender to palpation with the tongue depressor.  There is no submental firmness or woodiness.  No concerns for Cierra's, no trismus, no airway swelling, uvula is midline.  Neck: Grossly normal range of motion. Airway midline.  Cardiovascular: Normal heart rate, Normal rhythm.  Thorax & Lungs: No respiratory distress. Clear to Auscultation bilaterally.  Abdomen: Normal inspection. Nontender. Nondistended  Skin: No obvious rash.  Back: No tenderness, No CVA tenderness.   Musculoskeletal: No obvious deformity. Moves all extremities Well.  Neurologic: A&Ox3.   Psychiatric: Mood and affect are appropriate for situation.      COURSE & MEDICAL DECISION MAKING    ED Observation Status? No; Patient does not meet criteria for ED Observation.     INITIAL ASSESSMENT, COURSE AND PLAN  Care Narrative: 58-year-old male with no pertinent medical history here with dental abscess for 6 days without fevers or trismus.  Afebrile and reassuring vitals  On exam does have exam findings consistent with dental infection.  No focal area for drainage.  Low concerns for Dillon's angina.  Patient has no concerning features such as trismus, uvula deviation, " drooling or voice changes.  Treated for pain and will start Augmentin with first dose here.  Will send prescription to the pharmacy for him to  tomorrow.  Did encourage him to see a dentist for definitive follow-up.  He will discuss with [facility] if there can be assistance with arranging this.    Miriam Oro D.O. spent greater than 3 minutes with the patient explaining the importance of smoking cessation.       ADDITIONAL PROBLEM LIST  None  DISPOSITION AND DISCUSSIONS  I have discussed management of the patient with the following physicians and ARELIS's:  None    Discussion of management with other QHP or appropriate source(s): None     Escalation of care considered, and ultimately not performed:acute inpatient care management, however at this time, the patient is most appropriate for outpatient management    Barriers to care at this time, including but not limited to: Patient does not have established PCP.     Decision tools and prescription drugs considered including, but not limited to: Antibiotics Augmentin for dental infection .    FINAL DIAGNOSIS  1. Dental abscess           Electronically signed by: Miriam Oro D.O., 12/2/2023 12:41 AM

## 2023-12-02 NOTE — ED TRIAGE NOTES
"[facility]  58 y.o.  male    Chief Complaint   Patient presents with    Dental Pain     BIBA from [facility] for c/o L sided lower dental pain. Pt w swelling to L side of jaw. Pt able to manage oral secretions, airway patent.      Medications en route: 1 g tylenol and 600mg Ibuprofen     Pt to triage for above compliant.     Pt placed in lobby and educated on triage process. Pt encouraged to alert staff for any changes, pt verbalized understanding.     /86   Pulse 95   Temp 37.1 °C (98.7 °F) (Temporal)   Resp 16   Ht 1.778 m (5' 10\")   Wt 55.6 kg (122 lb 9.2 oz)   SpO2 96%   BMI 17.59 kg/m²     "

## 2023-12-02 NOTE — ED NOTES
Reviewed discharge instructions, pt verbalized understanding of follow up with Dental and PCP. All Rx/OTC medications reviewed with pt who voices understanding of medication use. Pt encouraged to return to the ED for fever, vomiting, or any other new/concerning symptoms.

## 2023-12-05 ENCOUNTER — HOSPITAL ENCOUNTER (EMERGENCY)
Facility: MEDICAL CENTER | Age: 58
End: 2023-12-05
Attending: EMERGENCY MEDICINE
Payer: MEDICAID

## 2023-12-05 VITALS
OXYGEN SATURATION: 98 % | TEMPERATURE: 96.6 F | DIASTOLIC BLOOD PRESSURE: 64 MMHG | SYSTOLIC BLOOD PRESSURE: 103 MMHG | RESPIRATION RATE: 15 BRPM | HEART RATE: 79 BPM

## 2023-12-05 DIAGNOSIS — Z59.00 HOMELESSNESS: ICD-10-CM

## 2023-12-05 DIAGNOSIS — K04.7 DENTAL INFECTION: Primary | ICD-10-CM

## 2023-12-05 DIAGNOSIS — F10.920 ALCOHOLIC INTOXICATION WITHOUT COMPLICATION (HCC): ICD-10-CM

## 2023-12-05 LAB — POC BREATHALIZER: 0.17 PERCENT (ref 0–0.01)

## 2023-12-05 PROCEDURE — 302970 POC BREATHALIZER: Performed by: EMERGENCY MEDICINE

## 2023-12-05 PROCEDURE — 99284 EMERGENCY DEPT VISIT MOD MDM: CPT

## 2023-12-05 SDOH — ECONOMIC STABILITY - HOUSING INSECURITY: HOMELESSNESS UNSPECIFIED: Z59.00

## 2023-12-06 NOTE — ED PROVIDER NOTES
"ED Provider Note    Scribed for Anish Mchugh by Ernst San. 12/5/2023  7:26 PM    Primary care provider: Pcp Pt States None  Means of arrival: EMS  History obtained from: Patient  History limited by: None    CHIEF COMPLAINT  Chief Complaint   Patient presents with    Alcohol Intoxication     When asked pt what brought him to the ER, pt stated \"There is no other place to go\" Pt states legs hurt. Denies falling.   +ETOH. Security saw pt stumble and called EMS. Pt endorses drinking 'a lot' today      EXTERNAL RECORDS REVIEWED  External ED Note seen multiple times in the ED for alcohol related issues at [facility]    HPI/ROS  LIMITATION TO HISTORY   Select: Intoxication  OUTSIDE HISTORIAN(S):  EMS BIB    HPI  Jhon Aguirre is a 58 y.o. male who presents to the Emergency Department via EMS for alcohol intoxication. EMS reports that the patient was seen stumbling by security and the patient did state to the  that he drank \"a lot\" today. The  called EMS after witnessing the patient stumble. Patient reports experiencing bilateral leg pain however denies any recent falls. At bedside, the patient states his last drink was 4 days ago. He reports that he knows when it is important to stop drinking and stopped following drainage of his dental abscess.  He also reports that his dental abscess has been improving since drainage 3 days ago. He reports feeling concerned that the abscess will return or worsen. Patient is unsure if he has had a fever recently. He reports not \"caring about those kinds of things\". Patient states that he has been taking his Augmentin as prescribed. No alleviating or exacerbating factors noted. Patient has a past medical history of renal failure. Patient notes that he is still [facility] the [facility]. He denies any drug use. Patient does smoke cigarettes.   He also reports that his hand has not been \"working\" for the last 18 months. He " states he just recently moved from [place] and his PCP is Dr. Beltre, a mobile physician.     REVIEW OF SYSTEMS  As above, all other systems reviewed and are negative.   See HPI for further details.     PAST MEDICAL HISTORY   has a past medical history of Dental disorder, Pain, and Renal failure (2010).  SURGICAL HISTORY   has a past surgical history that includes other; orif, fracture, radius and ulna (10/23/2011); and inguinal hernia repair (3/14/2014).  SOCIAL HISTORY  Social History     Tobacco Use    Smoking status: Every Day     Current packs/day: 0.50     Average packs/day: 0.5 packs/day for 20.0 years (10.0 ttl pk-yrs)     Types: Cigarettes    Smokeless tobacco: Never   Vaping Use    Vaping Use: Never used   Substance Use Topics    Alcohol use: Yes     Comment: occ.    Drug use: Not Currently     Comment: past mj use      Social History     Substance and Sexual Activity   Drug Use Not Currently    Comment: past mj use     FAMILY HISTORY  History reviewed. No pertinent family history.  CURRENT MEDICATIONS  Home Medications       Reviewed by Josselyn Miguel R.N. (Registered Nurse) on 12/05/23 at 1711  Med List Status: Partial     Medication Last Dose Status   amoxicillin-clavulanate (AUGMENTIN) 875-125 MG Tab  Active   folic acid (FOLVITE) 1 MG Tab  Active   multivitamin (THERAGRAN) Tab  Active   nicotine (NICODERM) 21 MG/24HR PATCH 24 HR  Active   omeprazole (PRILOSEC) 20 MG delayed-release capsule  Active   thiamine (THIAMINE) 100 MG tablet  Active                  ALLERGIES  No Known Allergies    PHYSICAL EXAM    VITAL SIGNS:   Vitals:    12/05/23 1658 12/05/23 2021 12/05/23 2022   BP: (!) 138/95 103/64 103/64   Pulse: (!) 101 79 79   Resp: 16 15 15   Temp: 36.4 °C (97.6 °F) 35.9 °C (96.6 °F) 35.9 °C (96.6 °F)   TempSrc: Temporal Temporal Temporal   SpO2: 95% 98% 98%     Vitals: My interpretation: hypertensive, tachycardic, afebrile, not hypoxic    Reinterpretation of vitals: Improved    PE:   Gen: Appears  disheveled, malodorous, smells of alcohol, intoxicated appearing, appears undomiciled   ENT: Mucous membranes moist, posterior pharynx clear, uvula midline, nares patent bilaterally   Dental: Poor dentition throughout, no apical abscess, multiple levels of decay  Neck: Supple, FROM  Pulmonary: Lungs are clear to auscultation bilaterally. No tachypnea  CV:  tachycardic, regular rhythm, no murmur appreciated, pulses 2+ in both upper and lower extremities  Abdomen: soft, NT/ND; no rebound/guarding  : no CVA or suprapubic tenderness   Neuro: A&Ox4 (person, place, time, situation), speech fluent, gait steady, no focal deficits appreciated  Skin: No rash or lesions.  No pallor or jaundice.  No cyanosis.  Warm and dry.     DIAGNOSTIC STUDIES / PROCEDURES    LABS  Results for orders placed or performed during the hospital encounter of 12/05/23   POC BREATHALIZER   Result Value Ref Range    POC Breathalizer 0.171 (A) 0.00 - 0.01 Percent      All labs reviewed by me. Labs were compared to prior labs if they were available. Significant for elevated alcohol 0.171    COURSE & MEDICAL DECISION MAKING  Nursing notes, VS, PMSFHx, labs, imaging, EKG reviewed in chart.    ED Observation Status? No, patient does not meet criteria for observation at this time.     Ddx: Homelessness, alcohol dependency, alcohol tox occasion, dentition, wound check    MDM: 7:26 PM Jhon Aguirre is a 58 y.o. male who presented with evaluation for alcohol intoxication and dental pain.  Patient is well-known to this department, seen frequently for alcohol related issues.  Was started on antibiotics recently for concerns of a possible dental infection.  He has been compliant with this.  He wants to be reevaluated.  Reportedly someone saw him stumbling intoxicated earlier today and called EMS and they brought him in.  He has no other complaints, no signs of trauma.  Vital signs are fairly unremarkable.  He admits to drinking today.  On exam he  appears undomiciled and homeless, mild, malodorous and disheveled.  But no signs of trauma or injury.  Dental exam shows unchanged baseline poor dentition with dental caries and multiple levels of decay.  Overall though notes concerning findings of sublingual or submandibular induration or facial swelling and I think continued antibiotics is the appropriate approach.  Patient we discharged from the emergency department after evaluation and instructed on cessation of alcohol and close outpatient follow-up.  He verbalized understanding and is amenable.    ADDITIONAL PROBLEM LIST AND DISPOSITION    I have discussed management of the patient with the following physicians and ARELIS's:  None    Discussion of management with other QHP or appropriate source(s): None     Escalation of care considered, and ultimately not performed:diagnostic imaging    Barriers to care at this time, including but not limited to: Patient does not have established PCP.     Decision tools and prescription drugs considered including, but not limited to: Antibiotics continue taking antibiotics .    FINAL IMPRESSION  1. Dental infection Acute   2. Alcoholic intoxication without complication (HCC) Acute   3. Homelessness Acute      Ernst MULTANI (Scribe), am scribing for, and in the presence of, Anish Mchugh.    Electronically signed by: Ernst San (Scribe), 12/5/2023    IAnish personally performed the services described in this documentation, as scribed by Ernst San in my presence, and it is both accurate and complete.    The note accurately reflects work and decisions made by me.  Anish Mchugh  12/5/2023  8:42 PM

## 2023-12-06 NOTE — ED TRIAGE NOTES
"  Chief Complaint   Patient presents with    Alcohol Intoxication     When asked pt what brought him to the ER, pt stated \"There is no other place to go\" Pt states legs hurt. Denies falling.   +ETOH. Security saw pt stumble and called EMS. Pt endorses drinking 'a lot' today      BP (!) 138/95   Pulse (!) 101   Temp 36.4 °C (97.6 °F) (Temporal)   Resp 16   SpO2 95%     Pt ambulate to triage. Pt rambling in triage and being uncooperative. Pt educated on triage process.   "

## 2023-12-06 NOTE — DISCHARGE INSTRUCTIONS
Please continue to take the antibiotics.  They are improving your symptoms.  Follow-up with your primary team for further evaluation.  Always come back for reevaluation to the emergency [facility].  Thank you for coming in today.  I do recommend that you stop drinking alcohol.

## 2023-12-06 NOTE — ED NOTES
"Pt to R12H via steady ambulation. When asked why patient is here, pt states \"I don't know why are you asking.\" Pt is breathing with even, unlabored respirations.   "

## 2023-12-08 ENCOUNTER — HOSPITAL ENCOUNTER (EMERGENCY)
Facility: MEDICAL CENTER | Age: 58
End: 2023-12-08
Attending: EMERGENCY MEDICINE
Payer: MEDICAID

## 2023-12-08 ENCOUNTER — APPOINTMENT (OUTPATIENT)
Dept: RADIOLOGY | Facility: MEDICAL CENTER | Age: 58
End: 2023-12-08
Attending: EMERGENCY MEDICINE
Payer: MEDICAID

## 2023-12-08 VITALS
BODY MASS INDEX: 17.99 KG/M2 | OXYGEN SATURATION: 93 % | TEMPERATURE: 98.2 F | DIASTOLIC BLOOD PRESSURE: 70 MMHG | HEIGHT: 70 IN | SYSTOLIC BLOOD PRESSURE: 120 MMHG | HEART RATE: 77 BPM | RESPIRATION RATE: 18 BRPM | WEIGHT: 125.66 LBS

## 2023-12-08 DIAGNOSIS — S42.214A CLOSED NONDISPLACED FRACTURE OF SURGICAL NECK OF RIGHT HUMERUS, UNSPECIFIED FRACTURE MORPHOLOGY, INITIAL ENCOUNTER: ICD-10-CM

## 2023-12-08 DIAGNOSIS — S42.191A CLOSED FRACTURE OF OTHER PART OF RIGHT SCAPULA, INITIAL ENCOUNTER: ICD-10-CM

## 2023-12-08 PROCEDURE — 700111 HCHG RX REV CODE 636 W/ 250 OVERRIDE (IP): Mod: UD | Performed by: EMERGENCY MEDICINE

## 2023-12-08 PROCEDURE — 73030 X-RAY EXAM OF SHOULDER: CPT | Mod: RT

## 2023-12-08 PROCEDURE — 96372 THER/PROPH/DIAG INJ SC/IM: CPT

## 2023-12-08 PROCEDURE — 99284 EMERGENCY DEPT VISIT MOD MDM: CPT

## 2023-12-08 RX ORDER — MORPHINE SULFATE 4 MG/ML
4 INJECTION INTRAVENOUS ONCE
Status: DISCONTINUED | OUTPATIENT
Start: 2023-12-08 | End: 2023-12-08

## 2023-12-08 RX ORDER — ONDANSETRON 4 MG/1
4 TABLET, ORALLY DISINTEGRATING ORAL ONCE
Status: COMPLETED | OUTPATIENT
Start: 2023-12-08 | End: 2023-12-08

## 2023-12-08 RX ORDER — MORPHINE SULFATE 4 MG/ML
4 INJECTION INTRAVENOUS ONCE
Status: COMPLETED | OUTPATIENT
Start: 2023-12-08 | End: 2023-12-08

## 2023-12-08 RX ORDER — ONDANSETRON 2 MG/ML
4 INJECTION INTRAMUSCULAR; INTRAVENOUS ONCE
Status: DISCONTINUED | OUTPATIENT
Start: 2023-12-08 | End: 2023-12-08 | Stop reason: ALTCHOICE

## 2023-12-08 RX ORDER — ONDANSETRON 2 MG/ML
4 INJECTION INTRAMUSCULAR; INTRAVENOUS ONCE
Status: DISCONTINUED | OUTPATIENT
Start: 2023-12-08 | End: 2023-12-08

## 2023-12-08 RX ORDER — HYDROCODONE BITARTRATE AND ACETAMINOPHEN 5; 325 MG/1; MG/1
1-2 TABLET ORAL EVERY 6 HOURS PRN
Qty: 20 TABLET | Refills: 0 | Status: SHIPPED | OUTPATIENT
Start: 2023-12-08 | End: 2023-12-13

## 2023-12-08 RX ADMIN — ONDANSETRON 4 MG: 4 TABLET, ORALLY DISINTEGRATING ORAL at 20:51

## 2023-12-08 RX ADMIN — MORPHINE SULFATE 4 MG: 4 INJECTION, SOLUTION INTRAMUSCULAR; INTRAVENOUS at 20:51

## 2023-12-08 ASSESSMENT — PAIN DESCRIPTION - PAIN TYPE: TYPE: ACUTE PAIN

## 2023-12-09 ENCOUNTER — PHARMACY VISIT (OUTPATIENT)
Dept: PHARMACY | Facility: MEDICAL CENTER | Age: 58
End: 2023-12-09
Payer: COMMERCIAL

## 2023-12-09 PROCEDURE — RXMED WILLOW AMBULATORY MEDICATION CHARGE: Performed by: EMERGENCY MEDICINE

## 2023-12-09 NOTE — ED NOTES
Pt discharged. GCS 15. Pt in possession of belongings. Pt provided discharge education and information pertaining to medications and follow up appointments. Pt received copy of discharge instructions and verbalized understanding. Opiates consent obtained.     Vitals:    12/08/23 1917   BP: 129/83   Pulse: 89   Resp: 18   Temp: 36.9 °C (98.5 °F)   SpO2: 96%

## 2023-12-09 NOTE — ED PROVIDER NOTES
"ED Provider Note    CHIEF COMPLAINT  Chief Complaint   Patient presents with    Shoulder Pain       EXTERNAL RECORDS REVIEWED  PDMP no current use of narcotic pain medication    HPI/ROS  LIMITATION TO HISTORY   Select: : None  OUTSIDE HISTORIAN(S):  None    Jhon Aguirre is a 58 y.o. male who presents with right shoulder pain, onset 2 days ago when he tripped and fell.  Pain in the anterior and posterior right shoulder with movement.  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  He states his primary care doctor is currently working him up in preparation to send him to a doctor to help operated on the contracture.  He is not currently established with an orthopedist.  Patient denies head or neck injury.  He admits to history of drinking alcohol but states \"I know not to drink alcohol and take pain medication\".  He denies other injury from the fall    PAST MEDICAL HISTORY   has a past medical history of Dental disorder, Pain, and Renal failure (2010).    SURGICAL HISTORY   has a past surgical history that includes other; orif, fracture, radius and ulna (10/23/2011); and inguinal hernia repair (3/14/2014).    FAMILY HISTORY  No family history on file.    SOCIAL HISTORY  Social History     Tobacco Use    Smoking status: Every Day     Current packs/day: 0.50     Average packs/day: 0.5 packs/day for 20.0 years (10.0 ttl pk-yrs)     Types: Cigarettes    Smokeless tobacco: Never   Vaping Use    Vaping Use: Never used   Substance and Sexual Activity    Alcohol use: Yes     Comment: occ.    Drug use: Not Currently     Comment: past mj use    Sexual activity: Not on file       CURRENT MEDICATIONS  Home Medications       Reviewed by Fátima Landeros R.N. (Registered Nurse) on 12/08/23 at 1927  Med List Status: Partial     Medication Last Dose Status   amoxicillin-clavulanate (AUGMENTIN) 875-125 MG Tab  Active   folic acid (FOLVITE) 1 " "MG Tab  Active   multivitamin (THERAGRAN) Tab  Active   nicotine (NICODERM) 21 MG/24HR PATCH 24 HR  Active   omeprazole (PRILOSEC) 20 MG delayed-release capsule  Active   thiamine (THIAMINE) 100 MG tablet  Active                    ALLERGIES  No Known Allergies    PHYSICAL EXAM  VITAL SIGNS: /83   Pulse 89   Temp 36.9 °C (98.5 °F) (Temporal)   Resp 18   Ht 1.778 m (5' 10\")   Wt 57 kg (125 lb 10.6 oz)   SpO2 96%   BMI 18.03 kg/m²    Musculoskeletal: Right shoulder tenderness without deformity.  Right hand contracture.  Right elbow and mid right humerus are nontender.  Spine nontender.  HEENT: Atraumatic  Neurologic: Weakness right arm and hand.  Sensation is intact to the right arm      DIAGNOSTIC STUDIES / PROCEDURES      RADIOLOGY  I have independently interpreted the diagnostic imaging associated with this visit and am waiting the final reading from the radiologist.   My preliminary interpretation is as follows: Possible right scapular fracture.  Radiologist interpretation:  DX-SHOULDER 2+ RIGHT   Final Result      1.  Minimally displaced fracture at the right humeral surgical neck.   2.  Possible nondisplaced extra-articular scapular fracture.   3.  Osteopenia.            COURSE & MEDICAL DECISION MAKING    ED Observation Status? No; Patient does not meet criteria for ED Observation.     INITIAL ASSESSMENT, COURSE AND PLAN  Care Narrative: Patient presents with right shoulder pain after a fall.  Differential diagnosis including fracture dislocation or sprain.  X-ray shows possible nondisplaced extra-articular scapular fracture, right humeral neck fracture.  Pain treated with morphine in the ER.  Patient states he does not drink daily, is aware he should not mix pain medication with alcohol.  He is prescribed hydrocodone for pain control.  He is referred to on-call orthopedics.  He is placed in a right shoulder sling and advised to wear this until advised otherwise by orthopedics.        DISPOSITION " AND DISCUSSIONS    Escalation of care considered, and ultimately not performed:acute inpatient care management, however at this time, the patient is most appropriate for outpatient management    Barriers to care at this time, including but not limited to: Patient does not have established PCP.     Decision tools and prescription drugs considered including, but not limited to: Pain Medications were prescribed after discussion of benefits and risks with the patient .    FINAL DIAGNOSIS  1. Closed nondisplaced fracture of surgical neck of right humerus, unspecified fracture morphology, initial encounter    2. Closed fracture of other part of right scapula, initial encounter           Electronically signed by: Vincenzo Santos M.D., 12/8/2023 8:18 PM

## 2023-12-09 NOTE — ED TRIAGE NOTES
Vitals:    12/08/23 1917   BP: 129/83   Pulse: 89   Resp: 18   Temp: 36.9 °C (98.5 °F)   SpO2: 96%     Chief Complaint   Patient presents with    Shoulder Pain     Pt reports he slipped and fell on his right shoulder a few days ago and the pain has continued and worsened. His RUE is contracted from a previous stroke. He was given 1 g tylenol and 600 mg ibuprofen en route by EMS.     Pt is ambulatory to and from triage and is alert and oriented x 4.

## 2023-12-09 NOTE — DISCHARGE INSTRUCTIONS
Wear shoulder sling while healing from fracture.  Follow-up with orthopedics for recheck and continued care.  Do not mix alcohol with narcotic pain medicine.

## 2023-12-17 ENCOUNTER — HOSPITAL ENCOUNTER (EMERGENCY)
Facility: MEDICAL CENTER | Age: 58
End: 2023-12-17
Attending: EMERGENCY MEDICINE
Payer: MEDICAID

## 2023-12-17 ENCOUNTER — APPOINTMENT (OUTPATIENT)
Dept: RADIOLOGY | Facility: MEDICAL CENTER | Age: 58
End: 2023-12-17
Attending: EMERGENCY MEDICINE
Payer: MEDICAID

## 2023-12-17 VITALS
OXYGEN SATURATION: 99 % | RESPIRATION RATE: 16 BRPM | DIASTOLIC BLOOD PRESSURE: 91 MMHG | HEART RATE: 92 BPM | HEIGHT: 70 IN | BODY MASS INDEX: 17.9 KG/M2 | SYSTOLIC BLOOD PRESSURE: 134 MMHG | TEMPERATURE: 97.8 F | WEIGHT: 125 LBS

## 2023-12-17 DIAGNOSIS — S42.201A CLOSED FRACTURE OF PROXIMAL END OF RIGHT HUMERUS, UNSPECIFIED FRACTURE MORPHOLOGY, INITIAL ENCOUNTER: ICD-10-CM

## 2023-12-17 DIAGNOSIS — F10.920 ALCOHOLIC INTOXICATION WITHOUT COMPLICATION (HCC): ICD-10-CM

## 2023-12-17 PROCEDURE — 99284 EMERGENCY DEPT VISIT MOD MDM: CPT

## 2023-12-17 PROCEDURE — 73060 X-RAY EXAM OF HUMERUS: CPT | Mod: RT

## 2023-12-17 RX ORDER — ACETAMINOPHEN 500 MG
1000 TABLET ORAL ONCE
Status: DISCONTINUED | OUTPATIENT
Start: 2023-12-17 | End: 2023-12-17 | Stop reason: HOSPADM

## 2023-12-17 ASSESSMENT — LIFESTYLE VARIABLES: DO YOU DRINK ALCOHOL: YES

## 2023-12-17 NOTE — ED PROVIDER NOTES
ED Provider Note    CHIEF COMPLAINT  Chief Complaint   Patient presents with    Alcohol Intoxication     EXTERNAL RECORDS REVIEWED  Patient is seen here frequently with alcohol related complains. He was last sen 12/8 for a right humeral neck fracture after a fall and was discharged with a sling and instructions to follow up with orthopedic surgery.     HPI/ROS  LIMITATION TO HISTORY   Intoxication, Patient is a poor historian.  OUTSIDE HISTORIAN(S):  None    Jhon Aguirre is a 58 y.o. male who presents to the Emergency Department with alcohol intoxication.  Per EMS history, he was found down laying in a parking lot and unable to ambulate. He reports right arm pain onset last week. Patient reports he has a broken right arm and is experiencing pain. He states he would like to go to the hospital, though he is currently in the hospital. He denies any new falls, but later states he did fall earlier today. He denies drinking any alcohol today and states he was laying down in a parking lot because he fell and could not move.     PAST MEDICAL HISTORY  Past Medical History:   Diagnosis Date    Dental disorder     poor dentations  broken teeth    Pain     Renal failure 2010    Resolved, due to dehydration        SURGICAL HISTORY  Past Surgical History:   Procedure Laterality Date    INGUINAL HERNIA REPAIR  3/14/2014    Performed by David Ramirez M.D. at SURGERY SAME DAY [facility]S    ORIF, FRACTURE, RADIUS AND ULNA  10/23/2011    Performed by GERMAN REILLY at SURGERY [facility]    OTHER      broken jaw        FAMILY HISTORY  No family history noted.    SOCIAL HISTORY   reports that he has been smoking cigarettes. He has a 10.0 pack-year smoking history. He has never used smokeless tobacco. He reports current alcohol use. He reports that he does not currently use drugs.    CURRENT MEDICATIONS  Discharge Medication List as of 12/17/2023  7:49 PM        CONTINUE these medications which have NOT CHANGED  "   Details   amoxicillin-clavulanate (AUGMENTIN) 875-125 MG Tab Take 1 Tablet by mouth 2 times a day., Disp-20 Tablet, R-0, Normal      omeprazole (PRILOSEC) 20 MG delayed-release capsule Take 1 Cap by mouth 2 Times a Day., Disp-30 Cap, R-0, Print Rx Paper      thiamine (THIAMINE) 100 MG tablet Take 1 Tab by mouth every day., Disp-30 Tab, R-0, Print Rx Paper      multivitamin (THERAGRAN) Tab Take 1 Tab by mouth every day., Disp-30 Tab, R-0, Print Rx Paper      folic acid (FOLVITE) 1 MG Tab Take 1 Tab by mouth every day., Disp-30 Tab, R-0, Print Rx Paper      nicotine (NICODERM) 21 MG/24HR PATCH 24 HR Apply 1 Patch to skin as directed every 24 hours., Disp-30 Patch, R-0, Print Rx Paper             ALLERGIES  Patient has no known allergies.    PHYSICAL EXAM  /82   Pulse 70   Temp 36 °C (96.8 °F) (Temporal)   Resp 16   Ht 1.778 m (5' 10\")   Wt 56.7 kg (125 lb)   SpO2 90%    Constitutional: Nontoxic appearing. Alert in no apparent distress.  HENT: Normocephalic, Atraumatic.  Moist mucous membranes.    Neck: Supple, full range of motion  Musculoskeletal: Atraumatic. No obvious deformities noted.   Skin: Warm, Dry.  No erythema, No rash. Sling in place in right upper extremity.   Neurologic: Alert and oriented x3. Moving all extremities spontaneously without focal deficits. Intact radial pulses, motor and sensation intact distal to injury.   Psychiatric: Focal to assess, intoxicated with slurred speech.       DIAGNOSTIC STUDIES / PROCEDURES    RADIOLOGY  I have independently interpreted the diagnostic imaging associated with this visit and am waiting the final reading from the radiologist.   My preliminary interpretation is a follows: nondisplaced right humerus fracture  Radiologist interpretation:   DX-HUMERUS 2+ RIGHT   Final Result      Again seen nondisplaced right humeral neck fracture.           COURSE & MEDICAL DECISION MAKING  3:25 PM - Patient seen and examined at bedside. Discussed plan of care, " including imaging. The patient will be medicated with Tylenol tablet 1 g. Ordered for DX-Humerus Right to evaluate his symptoms. Patient agrees to the plan of care.    ED Observation Status? Yes; I am placing the patient in to an observation status due to a diagnostic uncertainty as well as therapeutic intensity. Patient placed in observation status at 3:31 PM, 12/17/2023.     Observation plan is as follows: We will manage their symptoms, evaluate with diagnostic testing, and then reassess after results are reviewed      Upon Reevaluation, the patient's condition has: Improved; and will be discharged.    Patient discharged from ED Observation status at 7:49 PM 12/17/2023.      INITIAL ASSESSMENT, COURSE AND PLAN  Care Narrative: With history of alcohol abuse who presents after being found down intoxicated laying in a parking lot.  He is alert with reassuring vital signs on arrival.  He has no focal neurologic deficits on exam concerning for stroke.  Considered imaging however patient does not have any evidence of trauma on exam.  Right arm was elizabeth-rayed and continues to show a nondisplaced humerus fracture.  There is no evidence of neurovascular compromise.  Patient will be monitored here for sober reevaluation.    7:49 PM - Upon reassessment, patient is resting comfortably with normal vital signs. No new complaints at this time.  He is eating and ambulatory with a steady gait.  Discussed results with patient and/or family as well as importance of primary care and orthopedic follow up. Patient understands plan of care and strict return precautions for new or changing symptoms.     ADDITIONAL PROBLEM LIST  Problem #1: Acute alcohol intoxication -counseled on use    Problem #2: Recent right humerus fracture -nondisplaced, likely nonoperative, continue to wear splint with outpatient follow-up with orthopedic      DISPOSITION AND DISCUSSIONS  Escalation of care considered, and ultimately not performed:IV fluids, blood  analysis, and diagnostic imaging    Barriers to care at this time, including but not limited to: Patient does not have established PCP, Patient is homeless, and Patient lacks financial resources.     Decision tools and prescription drugs considered including, but not limited to: Pain Medications over-the-counter medication should be sufficient, patient with significant alcohol abuse history .    The patient will return for new or worsening symptoms and is stable at the time of discharge.    The patient is referred to a primary physician for blood pressure management, diabetic screening, and for all other preventative health concerns.    DISPOSITION:  Patient will be discharged home in stable condition.    FOLLOW UP:  [facility]  [address]  [facility] [id number] [phone]  Schedule an appointment as soon as possible for a visit       João Shah M.D.  [address]  [facility] [address] [phone]    Schedule an appointment as soon as possible for a visit       [facility], Emergency Dept  [address] [facility] [address] [phone]    If symptoms worsen      FINAL DIAGNOSIS  1. Alcoholic intoxication without complication (HCC)    2. Closed fracture of proximal end of right humerus, unspecified fracture morphology, initial encounter      The note accurately reflects work and decisions made by me.  Senait Alejandre M.D.  12/17/2023  9:06 PM    Leatha MULTANI ([place]), am scribing for, and in the presence of, Senait Alejandre M.D..    Electronically signed by: Leatha Elder), 12/17/2023    Senait MULTANI M.D. personally performed the services described in this documentation, as scribed by Leatha Ayoub in my presence, and it is both accurate and complete.

## 2023-12-17 NOTE — ED TRIAGE NOTES
Patient to ED with complaints of alcohol intoxication. Found laying in a parking lot, not able to ambulate when EMS arrived. Admits to acohol use today.   Does present to ED with right arm sling. Pt reports he was diagnosed with fracture 2 days ago.

## 2023-12-18 NOTE — ED NOTES
Patient awake and alert in room, finishing meal tray that was provided. Patient expressing desire to leave at this time. Patient educated that he needs to be more steady on his feet for a safe discharge.

## 2023-12-18 NOTE — DISCHARGE INSTRUCTIONS
You were seen in the Emergency Department for alcohol intoxication.    X-rays show right humerus fracture that is not significantly displaced.  You need to continue wearing your sling for the next 4 to 6 weeks and follow-up with orthopedic surgery.    Please use 1,000mg of tylenol or 600mg of ibuprofen every 6 hours as needed for pain.    Please follow up with your primary care physician.    Return to the Emergency Department with other concerns.

## 2023-12-18 NOTE — ED NOTES
Patient ambulating in hallway, requesting to leave at this time. ERP made aware and will be at bedside.

## 2023-12-18 NOTE — ED NOTES
Pt express that he wants to leave but that he doesn't feel that he can walk to the care campus. Encouraged to eat and drink and educated that will walk to see if he is steady in about 30-40 minutes.

## 2023-12-18 NOTE — ED NOTES
Bedside report received from off going RN/tech: Bella MCKEON, assumed care of patient.  POC discussed with patient. Call light within reach, all needs addressed at this time.       Fall risk interventions in place: Move the patient closer to the nurse's station, Patient's personal possessions are with in their safe reach, Place socks on patient, Place fall risk sign on patient's door, Give patient urinal if applicable, Keep floor surfaces clean and dry, and Accompanied to restroom (all applicable per [place] Fall risk assessment)   Continuous monitoring: Not Applicable   IVF/IV medications: Not Applicable   Oxygen: Room Air  Bedside sitter: Not Applicable   Isolation: Not Applicable

## 2023-12-18 NOTE — ED NOTES
"Jhon Harringtonopshire has been discharged from the Emergency Room.    Patient refusing vitals at discharge.  Discharge instructions, which include signs and symptoms to monitor patient for, as well as detailed information regarding Alcohol Intoxication provided.  Patient verbalizes understanding of follow up care and medication management. All questions and concerns addressed at this time.     Patient provided with education on when to return to the ER and verbally understands with no concerns. Patient advised on setting up MyChart and information provided about patient survey.  Patient leaves ER in no apparent distress. This RN provided education regarding returning to the ER for any new concerns or changes in patient's condition.      BP (!) 134/91   Pulse 92   Temp 36.6 °C (97.8 °F) (Temporal)   Resp 16   Ht 1.778 m (5' 10\")   Wt 56.7 kg (125 lb)   SpO2 99%   BMI 17.94 kg/m²   "

## 2023-12-19 ENCOUNTER — HOSPITAL ENCOUNTER (EMERGENCY)
Facility: MEDICAL CENTER | Age: 58
End: 2023-12-19
Payer: MEDICAID

## 2023-12-19 VITALS
HEART RATE: 64 BPM | OXYGEN SATURATION: 97 % | TEMPERATURE: 98.2 F | WEIGHT: 125.22 LBS | DIASTOLIC BLOOD PRESSURE: 97 MMHG | SYSTOLIC BLOOD PRESSURE: 164 MMHG | HEIGHT: 70 IN | RESPIRATION RATE: 18 BRPM | BODY MASS INDEX: 17.93 KG/M2

## 2023-12-19 PROCEDURE — 302449 STATCHG TRIAGE ONLY (STATISTIC)

## 2023-12-19 ASSESSMENT — PAIN DESCRIPTION - PAIN TYPE: TYPE: ACUTE PAIN

## 2023-12-19 NOTE — ED TRIAGE NOTES
"Chief Complaint   Patient presents with    Arm Pain     R arm pain. Pt had GLF a week ago and broke R arm. Pt said he saw PCP today and was told, \" they said they have to take my arm from me.\" Pt was BIBA [facility]. +ETOH         Pt BIBA from [place] for above complaint. Pt appears intoxicated.   Pt is AO x 4, follows commands, and responds appropriately to questions. Patient's breathing is unlabored and pain is currently 9/10 on the 0-10 pain scale.  Pt placed in lobby. Patient educated on triage process and encouraged to alert staff for any changes.      BP (!) 164/97   Pulse 64   Temp 36.8 °C (98.2 °F) (Temporal)   Resp 18   Ht 1.778 m (5' 10\")   Wt 56.8 kg (125 lb 3.5 oz)   SpO2 97%     "

## 2023-12-20 NOTE — ED NOTES
Attempted to call pt in the lobby. Triage staff informed me that he had walked out a few minutes prior to being called, but it was too busy to excuse him from the system. Informed them that I would go ahead and take him out of the system. Will be discharged now

## 2024-02-08 ENCOUNTER — APPOINTMENT (OUTPATIENT)
Dept: RADIOLOGY | Facility: MEDICAL CENTER | Age: 59
End: 2024-02-08
Attending: EMERGENCY MEDICINE

## 2024-02-08 ENCOUNTER — HOSPITAL ENCOUNTER (EMERGENCY)
Facility: MEDICAL CENTER | Age: 59
End: 2024-02-09
Attending: EMERGENCY MEDICINE

## 2024-02-08 DIAGNOSIS — F10.920 ALCOHOLIC INTOXICATION WITHOUT COMPLICATION (HCC): ICD-10-CM

## 2024-02-08 DIAGNOSIS — M79.601 RIGHT ARM PAIN: ICD-10-CM

## 2024-02-08 PROCEDURE — 73060 X-RAY EXAM OF HUMERUS: CPT | Mod: RT

## 2024-02-08 PROCEDURE — 700102 HCHG RX REV CODE 250 W/ 637 OVERRIDE(OP): Performed by: EMERGENCY MEDICINE

## 2024-02-08 PROCEDURE — A9270 NON-COVERED ITEM OR SERVICE: HCPCS | Performed by: EMERGENCY MEDICINE

## 2024-02-08 PROCEDURE — 99284 EMERGENCY DEPT VISIT MOD MDM: CPT

## 2024-02-08 RX ORDER — IBUPROFEN 600 MG/1
600 TABLET ORAL ONCE
Status: DISCONTINUED | OUTPATIENT
Start: 2024-02-08 | End: 2024-02-09 | Stop reason: HOSPADM

## 2024-02-09 VITALS
WEIGHT: 122.58 LBS | SYSTOLIC BLOOD PRESSURE: 146 MMHG | RESPIRATION RATE: 18 BRPM | DIASTOLIC BLOOD PRESSURE: 98 MMHG | TEMPERATURE: 97.5 F | HEART RATE: 78 BPM | OXYGEN SATURATION: 94 % | HEIGHT: 66 IN | BODY MASS INDEX: 19.7 KG/M2

## 2024-02-09 PROCEDURE — 73090 X-RAY EXAM OF FOREARM: CPT | Mod: RT

## 2024-02-09 NOTE — ED NOTES
Bedside report received from off going RN: Maria Del Rosario, assumed care of patient.  POC discussed with patient. Call light within reach, all needs addressed at this time.       Fall risk interventions in place: Patient's personal possessions are with in their safe reach and Keep floor surfaces clean and dry (all applicable per [place] Fall risk assessment)   Continuous monitoring: Pulse Ox or Blood Pressure  IVF/IV medications: Not Applicable   Oxygen: Room Air  Bedside sitter: Not Applicable   Isolation: Not Applicable

## 2024-02-09 NOTE — ED PROVIDER NOTES
"ED Provider Note    CHIEF COMPLAINT  Chief Complaint   Patient presents with    Arm Pain     Right arm/shoulder pain. Reports of fracture on said arm in December. No records found. Denies recent fall. Poor historian. +ETOH. BIB [facility] from [facility].        EXTERNAL RECORDS REVIEWED  Other none available    HPI/ROS  LIMITATION TO HISTORY   Select: Intoxication  OUTSIDE HISTORIAN(S):  None    Jhon Landeros is a 58 y.o. male who presents to the ER with acute on chronic arm pain.  States that he allegedly broke his arm 2 months ago.  Reports that he has outpatient follow-up with [facility].  Has been wearing a sling.  Admits to drinking heavily daily.  Tonight coming in for chronic pain from [facility] via EMS.  Denies any new symptoms.    PAST MEDICAL HISTORY       SURGICAL HISTORY  patient denies any surgical history    FAMILY HISTORY  No family history on file.    SOCIAL HISTORY  Social History     Tobacco Use    Smoking status: Not on file    Smokeless tobacco: Not on file   Substance and Sexual Activity    Alcohol use: Not on file    Drug use: Not on file    Sexual activity: Not on file       CURRENT MEDICATIONS  Home Medications       Reviewed by Manuel Carter R.N. (Registered Nurse) on 02/08/24 at 2149  Med List Status: Partial     Medication Last Dose Status        Patient Anish Taking any Medications                           ALLERGIES  No Known Allergies    PHYSICAL EXAM  VITAL SIGNS: BP (!) 146/98   Pulse 78   Temp 36.4 °C (97.5 °F) (Temporal)   Resp 18   Ht 1.676 m (5' 6\")   Wt 55.6 kg (122 lb 9.2 oz)   SpO2 94%   BMI 19.78 kg/m²      Pulse ox interpretation: I interpret this pulse ox as normal.  Constitutional: Alert in no apparent distress.  Unkempt  HENT: No signs of trauma, Bilateral external ears normal, Nose normal.   Eyes: Pupils are equal and reactive, Conjunctiva normal, Non-icteric.   Neck: Normal range of motion, No tenderness, Supple, No stridor.   Lymphatic: No " lymphadenopathy noted.   Cardiovascular: Regular rate and rhythm, no murmurs.   Thorax & Lungs: Normal breath sounds, No respiratory distress, No wheezing, No chest tenderness.   Skin: Warm, Dry, No erythema, No rash.   Extremities: Right upper extremity: Sling in place.  Tender to proximal arm/shoulder.  Musculoskeletal: Good range of motion in all major joints. No tenderness to palpation or major deformities noted.   Neurologic: Alert , awake.  AOx4.  Slurred speech.  Psychiatric: Argumentative        DIAGNOSTIC STUDIES / PROCEDURES    RADIOLOGY  I have independently interpreted the diagnostic imaging associated with this visit and am waiting the final reading from the radiologist.   My preliminary interpretation is as follows: No obvious fracture or dislocation  Radiologist interpretation:   DX-FOREARM RIGHT   Final Result         1.  No acute traumatic bony injury.   2.  Degenerative changes of the carpus.      DX-HUMERUS 2+ RIGHT   Final Result         1.  No acute traumatic bony injury.            COURSE & MEDICAL DECISION MAKING    ED Observation Status? No; Patient does not meet criteria for ED Observation.     INITIAL ASSESSMENT, COURSE AND PLAN  Care Narrative: Patient presenting to the emergency department for right arm pain.  Reporting fracture which was allegedly identified at [facility].  Given inability to have chart review will complete new imaging tonight.  Will treat patient's pain with ibuprofen and ice packs.    DISPOSITION AND DISCUSSIONS  I have discussed management of the patient with the following physicians and ARELIS's: None    Discussion of management with other QHP or appropriate source(s): None     Escalation of care considered, and ultimately not performed:blood analysis and acute inpatient care management, however at this time, the patient is most appropriate for outpatient management    Barriers to care at this time, including but not limited to: Patient does not have  established PCP.     Decision tools and prescription drugs considered including, but not limited to: Pain Medications OTC medications have been recommended .    58-year-old presenting with an acute alcohol intoxication and complaining of ongoing right arm pain.  Repeat imaging does not show fracture or dislocation as the patient is reporting.  At this time the patient is wearing a sling.  I have recommended over-the-counter medications for comfort.  He is understanding of RICE instructions.  He is understanding that he needs to drink less.  Will return to the ER if needed.  Otherwise will be referred back to [facility] for ongoing arm pain care and workup    FINAL DIAGNOSIS  1. Right arm pain    2. Alcoholic intoxication without complication (HCC)           Electronically signed by: Jaswinder Soares M.D., 2/8/2024 10:41 PM

## 2024-02-09 NOTE — ED NOTES
Bedside report to LINDA Duff. Plan of care discussed with patient. Bed locked and in lowest position. Call light available and within reach. Appropriate fall precautions in place. No distress noted. This RN removed from care.

## 2024-02-09 NOTE — ED NOTES
Patient being verbally aggressive to staff. Cussing and yelling at staff while staff is trying to check in other patients.

## 2024-02-09 NOTE — ED TRIAGE NOTES
Jhon Landeros  58 y.o. male  Chief Complaint   Patient presents with    Arm Pain     Right arm/shoulder pain. Reports of fracture on said arm in December. No records found. Denies recent fall. Poor historian. +ETOH. BIB [facility] from [facility].      Pt BIB EMS for above complaint. Belligerent in triage.     Pt is GCS 15, speaking in full sentences. Resp are even and unlabored.     Pt placed in ED lobby. Pt educated on triage process. Pt encouraged to alert staff for any changes.       Vitals:    02/08/24 2138   BP: (!) 143/101   Pulse: 81   Resp: 18   Temp: 36.4 °C (97.6 °F)   SpO2: 92%

## 2024-02-12 PROBLEM — M24.541 CONTRACTURE OF HAND JOINT, RIGHT: Status: ACTIVE | Noted: 2024-02-12

## 2024-03-07 ENCOUNTER — HOSPITAL ENCOUNTER (EMERGENCY)
Facility: MEDICAL CENTER | Age: 59
End: 2024-03-08
Attending: STUDENT IN AN ORGANIZED HEALTH CARE EDUCATION/TRAINING PROGRAM
Payer: MEDICAID

## 2024-03-07 VITALS
HEART RATE: 86 BPM | TEMPERATURE: 98.4 F | OXYGEN SATURATION: 96 % | WEIGHT: 132 LBS | DIASTOLIC BLOOD PRESSURE: 83 MMHG | RESPIRATION RATE: 20 BRPM | BODY MASS INDEX: 21.21 KG/M2 | HEIGHT: 66 IN | SYSTOLIC BLOOD PRESSURE: 136 MMHG

## 2024-03-07 DIAGNOSIS — R45.1 AGITATION REQUIRING SEDATION PROTOCOL: ICD-10-CM

## 2024-03-07 DIAGNOSIS — F10.920 ALCOHOLIC INTOXICATION WITHOUT COMPLICATION (HCC): ICD-10-CM

## 2024-03-07 PROCEDURE — 99285 EMERGENCY DEPT VISIT HI MDM: CPT

## 2024-03-07 PROCEDURE — 96372 THER/PROPH/DIAG INJ SC/IM: CPT

## 2024-03-07 PROCEDURE — 700111 HCHG RX REV CODE 636 W/ 250 OVERRIDE (IP): Mod: JZ,UD

## 2024-03-07 RX ORDER — DIPHENHYDRAMINE HYDROCHLORIDE 50 MG/ML
50 INJECTION INTRAMUSCULAR; INTRAVENOUS ONCE
Status: COMPLETED | OUTPATIENT
Start: 2024-03-07 | End: 2024-03-07

## 2024-03-07 RX ORDER — HALOPERIDOL 5 MG/ML
INJECTION INTRAMUSCULAR
Status: COMPLETED
Start: 2024-03-07 | End: 2024-03-07

## 2024-03-07 RX ORDER — HALOPERIDOL 5 MG/ML
5 INJECTION INTRAMUSCULAR ONCE
Status: COMPLETED | OUTPATIENT
Start: 2024-03-07 | End: 2024-03-07

## 2024-03-07 RX ORDER — DIPHENHYDRAMINE HYDROCHLORIDE 50 MG/ML
INJECTION INTRAMUSCULAR; INTRAVENOUS
Status: COMPLETED
Start: 2024-03-07 | End: 2024-03-07

## 2024-03-07 RX ADMIN — DIPHENHYDRAMINE HYDROCHLORIDE 50 MG: 50 INJECTION, SOLUTION INTRAMUSCULAR; INTRAVENOUS at 20:30

## 2024-03-07 RX ADMIN — HALOPERIDOL 5 MG: 5 INJECTION INTRAMUSCULAR at 20:30

## 2024-03-07 RX ADMIN — HALOPERIDOL LACTATE 5 MG: 5 INJECTION, SOLUTION INTRAMUSCULAR at 20:30

## 2024-03-07 RX ADMIN — DIPHENHYDRAMINE HYDROCHLORIDE 50 MG: 50 INJECTION INTRAMUSCULAR; INTRAVENOUS at 20:30

## 2024-03-07 ASSESSMENT — PAIN DESCRIPTION - PAIN TYPE: TYPE: ACUTE PAIN

## 2024-03-08 NOTE — ED NOTES
Pt agrees to be calm and cooperative. Pt requesting for food, RN will provide. Pt reports he will go back to [facility]. RN explained he needs to ambulate first, pt agreeable with plan. ERP notified.

## 2024-03-08 NOTE — ED NOTES
"Pt refused repeat vital signs and dc papers, refused to remove identification band. Cab voucher offered, pt refused states \"I will just walk! \" Pt escorted to ED lobby exit by security for safe discharge. Alert and oriented, with belongings, ambulatory with a steady gait. Pt drank the soda but refused the sandwich.   "

## 2024-03-08 NOTE — ED NOTES
Pt ambulatory with a steady gait. Pt states he is ready to go. SW informed to provide a cab voucher.

## 2024-03-08 NOTE — ED TRIAGE NOTES
".  Chief Complaint   Patient presents with    Alcohol Intoxication    ALOC       59 yr patient BIB [facility] [facility] for above complaint.      Pt placed [facility]  Patient and staff wearing appropriate PPE    /83   Pulse 86   Resp (!) 198   Ht 1.676 m (5' 6\")   Wt 59.9 kg (132 lb)   SpO2 96%   BMI 21.31 kg/m²     "

## 2024-03-08 NOTE — ED PROVIDER NOTES
CHIEF COMPLAINT  Chief Complaint   Patient presents with    Alcohol Intoxication    ALOC       LIMITATION TO HISTORY   Select: Alcohol intoxication    HPI    Jhon Aguirre is a 59 y.o. male who presents to the Emergency Department evaluation of alcohol intoxication.  Per EMS the patient was at the [facility], where he was found intoxicated unable to ambulate without assistance.  He was agitated throughout their transport and upon arrival in the emergency department he is intermittently agitated though denies any specific complaints does admit to consuming alcohol today.  OUTSIDE HISTORIAN(S):  Select: EMS states that patient was unable to ambulate without assistance    EXTERNAL RECORDS REVIEWED  Select: Other multiple emergency department visits, for alcohol intoxication reviewed EMS note from today blood glucose was within normal limits.      PAST MEDICAL HISTORY  Past Medical History:   Diagnosis Date    Dental disorder     poor dentations  broken teeth    Pain     Renal failure 2010    Resolved, due to dehydration     .    SURGICAL HISTORY  Past Surgical History:   Procedure Laterality Date    INGUINAL HERNIA REPAIR  3/14/2014    Performed by David Ramirez M.D. at SURGERY SAME DAY [facility] ORS    ORIF, FRACTURE, RADIUS AND ULNA  10/23/2011    Performed by GERMAN REILLY at SURGERY [facility] ORS    OTHER      broken jaw         FAMILY HISTORY  No family history on file.       SOCIAL HISTORY  Social History     Socioeconomic History    Marital status:      Spouse name: Not on file    Number of children: Not on file    Years of education: Not on file    Highest education level: Not on file   Occupational History    Not on file   Tobacco Use    Smoking status: Every Day     Current packs/day: 0.50     Average packs/day: 0.5 packs/day for 20.0 years (10.0 ttl pk-yrs)     Types: Cigarettes    Smokeless tobacco: Never   Vaping Use    Vaping Use: Never used   Substance and Sexual Activity     "Alcohol use: Yes     Comment: daily    Drug use: Not Currently     Comment: past mj use    Sexual activity: Not on file   Other Topics Concern    Not on file   Social History Narrative    Not on file     Social Determinants of Health     Financial Resource Strain: Not on file   Food Insecurity: Not on file   Transportation Needs: Not on file   Physical Activity: Not on file   Stress: Not on file   Social Connections: Not on file   Intimate Partner Violence: Not on file   Housing Stability: Not on file         CURRENT MEDICATIONS  No current facility-administered medications on file prior to encounter.     Current Outpatient Medications on File Prior to Encounter   Medication Sig Dispense Refill    amoxicillin-clavulanate (AUGMENTIN) 875-125 MG Tab Take 1 Tablet by mouth 2 times a day. 20 Tablet 0    omeprazole (PRILOSEC) 20 MG delayed-release capsule Take 1 Cap by mouth 2 Times a Day. 30 Cap 0    thiamine (THIAMINE) 100 MG tablet Take 1 Tab by mouth every day. 30 Tab 0    multivitamin (THERAGRAN) Tab Take 1 Tab by mouth every day. 30 Tab 0    folic acid (FOLVITE) 1 MG Tab Take 1 Tab by mouth every day. 30 Tab 0    nicotine (NICODERM) 21 MG/24HR PATCH 24 HR Apply 1 Patch to skin as directed every 24 hours. 30 Patch 0           ALLERGIES  No Known Allergies    PHYSICAL EXAM  VITAL SIGNS:/83   Pulse 86   Temp 36.9 °C (98.4 °F) (Temporal)   Resp 20   Ht 1.676 m (5' 6\")   Wt 59.9 kg (132 lb)   SpO2 96%   BMI 21.31 kg/m²       VITALS - vital signs documented prior to this note have been reviewed and noted,  GENERAL - awake alert agitated yelling profanities at staff, smells of consumed alcoholic beverages poor hygiene  HEENT - normocephalic, atraumatic, pupils equal, sclera anicteric, mucus  membranes moist  NECK - supple, no meningismus, full active range of motion, trachea midline  CARDIOVASCULAR - regular rate/rhythm, no murmurs/gallops/rubs  PULMONARY - no respiratory distress, speaking in full sentences, " clear to  auscultation bilaterally, no wheezing/ronchi/rales, no accessory muscle use  GASTROINTESTINAL - soft, non-tender, non-distended, no rebound, guarding,  or peritonitis  GENITOURINARY - Deferred  NEUROLOGIC -awake alert agitated moving all extremities  MUSCULOSKELETAL - no obvious asymmetry or deformities present  EXTREMITIES - warm, well-perfused, no cyanosis or significant edema  DERMATOLOGIC - warm, dry, no rashes, no jaundice      DIAGNOSTIC STUDIES / PROCEDURES              Radiologist interpretation:   No orders to display        COURSE & MEDICAL DECISION MAKING    ED COURSE:    Patient was placed in ED observation at 2030 on 3/7/2024 plan was observation for clinical sobriety      INTERVENTIONS BY ME:  Medications   diphenhydrAMINE (Benadryl) injection 50 mg (50 mg Intramuscular Given 3/7/24 2030)   haloperidol lactate (Haldol) injection 5 mg (5 mg Intramuscular Given 3/7/24 2030)       Response on recheck:.    Critical care    Critical Care Procedure Note    Total critical care time: Approximately 36 minutes    Upon my assess due to a high probability of clinically significant, life threatening deterioration, secondary to agitation requiring sedation the patient required my direct attention and intervention. This critical care time included obtaining a history; examining the patient; pulse oximetry; ordering and review of studies; arranging urgent treatment with development of a management plan; evaluation of patient's response to treatment; frequent reassessment; and, discussions with other providers.    was exclusive of separately billable procedures and treating other patients and teaching time.        INITIAL ASSESSMENT, COURSE AND PLAN  Care Narrative: Patient presented for evaluation of agitation and alcohol intoxication.  Upon arrival in the emergency department the patient did admit to consuming alcohol though he was agitated with the staff, after verbal de-escalation failed, he was sedated  for patient and staff safety.  Patient was placed into ED observation pending clinical sobriety.  Patient was signed out to my partner to follow-up on the patient pending clinical sobriety.             ADDITIONAL PROBLEM LIST  History of alcohol abuse  DISPOSITION AND DISCUSSIONS  I    Escalation of care considered, and ultimately not performed:blood analysis    Barriers to care at this time, including but not limited to: Patient is homeless.         FINAL DIAGNOSIS  #1 acute alcohol intoxication  2.  Agitation requiring sedation protocol    Electronically signed by: João Root DO ,11:42 PM 03/07/24

## 2024-03-21 ENCOUNTER — HOSPITAL ENCOUNTER (EMERGENCY)
Facility: MEDICAL CENTER | Age: 59
End: 2024-03-22
Attending: STUDENT IN AN ORGANIZED HEALTH CARE EDUCATION/TRAINING PROGRAM
Payer: MEDICAID

## 2024-03-21 DIAGNOSIS — M79.601 RIGHT ARM PAIN: ICD-10-CM

## 2024-03-21 DIAGNOSIS — F10.920 ALCOHOLIC INTOXICATION WITHOUT COMPLICATION (HCC): ICD-10-CM

## 2024-03-21 PROCEDURE — 99284 EMERGENCY DEPT VISIT MOD MDM: CPT

## 2024-03-22 VITALS
HEIGHT: 66 IN | HEART RATE: 77 BPM | OXYGEN SATURATION: 100 % | RESPIRATION RATE: 18 BRPM | WEIGHT: 110 LBS | BODY MASS INDEX: 17.68 KG/M2

## 2024-03-22 PROCEDURE — 700111 HCHG RX REV CODE 636 W/ 250 OVERRIDE (IP): Mod: JZ,UD | Performed by: STUDENT IN AN ORGANIZED HEALTH CARE EDUCATION/TRAINING PROGRAM

## 2024-03-22 PROCEDURE — 96372 THER/PROPH/DIAG INJ SC/IM: CPT

## 2024-03-22 RX ORDER — DIPHENHYDRAMINE HYDROCHLORIDE 50 MG/ML
50 INJECTION INTRAMUSCULAR; INTRAVENOUS ONCE
Status: COMPLETED | OUTPATIENT
Start: 2024-03-22 | End: 2024-03-22

## 2024-03-22 RX ORDER — HALOPERIDOL 5 MG/ML
5 INJECTION INTRAMUSCULAR ONCE
Status: COMPLETED | OUTPATIENT
Start: 2024-03-22 | End: 2024-03-22

## 2024-03-22 RX ADMIN — DIPHENHYDRAMINE HYDROCHLORIDE 50 MG: 50 INJECTION, SOLUTION INTRAMUSCULAR; INTRAVENOUS at 00:14

## 2024-03-22 RX ADMIN — HALOPERIDOL LACTATE 5 MG: 5 INJECTION, SOLUTION INTRAMUSCULAR at 00:14

## 2024-03-22 NOTE — ED PROVIDER NOTES
ED Provider Note    CHIEF COMPLAINT  Chief Complaint   Patient presents with    Pain     Right arm pain and soreness; states it's been 2 1/2 yrs. States he has appointment next week; refused for VS; patient is intoxicated; BIB EMS, patient was trespassing [facility] and opted to come here instead going to residential. Cussing in the lobby  and refused being touched.        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  He was referred to neurology by orthopedics    HPI/ROS  LIMITATION TO HISTORY   Select: Intoxication  OUTSIDE HISTORIAN(S):  None    Jhon Aguirre is a 59 y.o. male who presents after he was found to be trespassing at [facility] and when police arrived to the [facility] they offered to take him to the hospital or take him to residential.  He said that he wanted to go to the hospital.  Patient states that he has had pain in the right arm for the past 10 months.  He denies any new pain in this area.  He states that he broke his right proximal humerus and was previously wearing a sling.  Patient is intoxicated and is aggressive.  Further HPI is limited.    PAST MEDICAL HISTORY   has a past medical history of Dental disorder, Pain, and Renal failure (2010).    SURGICAL HISTORY   has a past surgical history that includes other; orif, fracture, radius and ulna (10/23/2011); and inguinal hernia repair (3/14/2014).    FAMILY HISTORY  No family history on file.    SOCIAL HISTORY  Social History     Tobacco Use    Smoking status: Every Day     Current packs/day: 0.50     Average packs/day: 0.5 packs/day for 20.0 years (10.0 ttl pk-yrs)     Types: Cigarettes    Smokeless tobacco: Never   Vaping Use    Vaping Use: Never used   Substance and Sexual Activity    Alcohol use: Yes     Comment: daily    Drug use: Not Currently     Comment: past mj use    Sexual  "activity: Not on file       CURRENT MEDICATIONS  Home Medications       Reviewed by Caryn Hernandez R.N. (Registered Nurse) on 03/21/24 at 2217  Med List Status: Not Addressed     Medication Last Dose Status   amoxicillin-clavulanate (AUGMENTIN) 875-125 MG Tab  Active   folic acid (FOLVITE) 1 MG Tab  Active   multivitamin (THERAGRAN) Tab  Active   nicotine (NICODERM) 21 MG/24HR PATCH 24 HR  Active   omeprazole (PRILOSEC) 20 MG delayed-release capsule  Active   thiamine (THIAMINE) 100 MG tablet  Active                    ALLERGIES  No Known Allergies    PHYSICAL EXAM  VITAL SIGNS: Pulse 77   Resp 18   Ht 1.676 m (5' 6\")   Wt 49.9 kg (110 lb)   SpO2 100%   BMI 17.75 kg/m²      Constitutional: Well developed, Well nourished, No acute respiratory distress, Non-toxic appearance.   HENT: Normocephalic, Atraumatic  Cardiovascular: Normal heart rate, Normal rhythm, No murmurs, No rubs, No gallops.   Thorax & Lungs: Clear to auscultation bilaterally, No respiratory distress, No wheezing.  Abdomen: Soft nontender   Skin: Warm, Dry, No erythema, No rash.   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 No; Patient does not meet criteria for ED Observation.     5:59 AM patient reevaluated.  He has been able to sleep.  He was then awoken and is much more reasonable.  He is tolerating p.o. and he is now ambulatory.  He is no longer aggressive.  I discussed that he needs to follow-up with orthopedics as well as neurology for his chronic arm pain and contractures.  He voices understanding.  Strict ER return precautions were advised.  Patient is agreeable to discharge home no further questions.      INITIAL ASSESSMENT, COURSE AND PLAN  Care " Narrative:   This is a 59-year-old male who was brought in by EMS after he was trespassing at the local homeless shelter and was given the option to go to the hospital or go to longterm and he chose to come to the hospital.  On arrival here he is intoxicated, agitated, unable to reason with.  He is attempting to leave the hospital room and is yelling.  I am concerned for his safety and do not think that is safe discharge home given his intoxication and he is slurring his speech.  Because of this, he was chemically sedated and was allowed to sleep.    Patient was noting that he has some right arm pain.  This is due to a previous injury.  The right arm has a chronic contracture at the hand and previously noted ulnar neuropathy.  His exam is unchanged at this point today.  He denies any acute trauma of the arm therefore no imaging was performed.  I doubt acute fracture.  There is no signs of infection to the arm.  He has good pulses in the arm as well.    Patient is advised to follow-up with neurology as well as orthopedics as he has scheduled.  Patient was allowed to sleep and he metabolize his alcohol appropriately.  He then awoke, had steady gait, was tolerating p.o. without difficulty.  Strict ER return precautions were advised.  Patient is agreeable to discharge plan with no further questions.        ADDITIONAL PROBLEM LIST  Right arm pain - chronic     DISPOSITION AND DISCUSSIONS  I have discussed management of the patient with the following physicians and ARELIS's:  None    Discussion of management with other QHP or appropriate source(s): None     Escalation of care considered, and ultimately not performed:diagnostic imaging    Barriers to care at this time, including but not limited to:  None .     Decision tools and prescription drugs considered including, but not limited to:  None .    The patient will return for new or worsening symptoms and is stable at the time of discharge.    The patient is referred to a  primary physician for blood pressure management, diabetic screening, and for all other preventative health concerns.      DISPOSITION:  Patient will be discharged home in stable condition.    FOLLOW UP:  Your primary care doctor          [facility], Emergency Dept  [address] [address] [phone]          OUTPATIENT MEDICATIONS:  Discharge Medication List as of 3/22/2024  6:03 AM          FINAL DIAGNOSIS  1. Alcoholic intoxication without complication (HCC)    2. Right arm pain           Electronically signed by: Marifer Rainey M.D., 3/21/2024 11:56 PM

## 2024-03-22 NOTE — ED NOTES
Patient a&ox4, given discharge instructions, home care instructions explained, patient verbalized understanding of instructions given/patient understands importance of follow up, patient ambulatory to ER [facility].

## 2024-03-22 NOTE — ED NOTES
Pt is a high fall risk and placed on a bed alarm. Pt became verbally abusive to the hospital staff, shouting profanity at staff. Pt became increasingly agitated and was helped back in bed. Security was called, ERP came to bedside and assessed patient's gate. Pt walked down the hallway steadily but refused to go back to his room for discharge assessment. Pt unable to answer orientation questions and behaving erratically. Pt was escorted back to room with security and patient medicated per the mar. Pt now resting in bed with the bed alarm on, side rails up on [facility].

## 2024-03-22 NOTE — ED TRIAGE NOTES
Chief Complaint   Patient presents with    Pain     Right arm pain and soreness; states it's been 2 1/2 yrs. States he has appointment next week; refused for VS; patient is intoxicated; BIB EMS, patient was [facility] and opted to come here instead going to senior care. Cussing in the lobby  and refused being touched.        Pt to triage via WC for above complaint. Patient denying any alcohol consumption tonight    Pt back to lobby, educated on triage process and encourage to alert staff of any changes.     There were no vitals filed for this visit.      VS from EMS  received BP: 141/84 Spo2= 93% on room air HR; 105

## 2024-03-22 NOTE — ED NOTES
Pt continues to try and get up, security at bedside, pt continues to shout profanity at staff. Pt helped back in bed and refuses vitals

## 2024-03-22 NOTE — ED NOTES
Pt transported to room from lobby. Pt stumbling in lobby, initially uncooperative getting into wheelchair, pt visibly soiled.

## 2024-04-02 ENCOUNTER — APPOINTMENT (OUTPATIENT)
Dept: RADIOLOGY | Facility: MEDICAL CENTER | Age: 59
End: 2024-04-02
Attending: EMERGENCY MEDICINE
Payer: MEDICAID

## 2024-04-02 ENCOUNTER — HOSPITAL ENCOUNTER (EMERGENCY)
Facility: MEDICAL CENTER | Age: 59
End: 2024-04-03
Attending: EMERGENCY MEDICINE
Payer: MEDICAID

## 2024-04-02 VITALS — HEIGHT: 70 IN | WEIGHT: 110 LBS | BODY MASS INDEX: 15.75 KG/M2

## 2024-04-02 DIAGNOSIS — S00.83XA CONTUSION OF FACE, INITIAL ENCOUNTER: ICD-10-CM
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
## 2024-04-02 DIAGNOSIS — S00.81XA ABRASION OF FACE, INITIAL ENCOUNTER: ICD-10-CM

## 2024-04-02 DIAGNOSIS — Z59.00 HOMELESSNESS: ICD-10-CM

## 2024-04-02 DIAGNOSIS — M24.541 CONTRACTURE, RIGHT HAND: ICD-10-CM

## 2024-04-02 DIAGNOSIS — S09.90XA CLOSED HEAD INJURY, INITIAL ENCOUNTER: ICD-10-CM

## 2024-04-02 PROCEDURE — 99284 EMERGENCY DEPT VISIT MOD MDM: CPT

## 2024-04-02 SDOH — ECONOMIC STABILITY - HOUSING INSECURITY: HOMELESSNESS UNSPECIFIED: Z59.00

## 2024-04-03 ENCOUNTER — APPOINTMENT (OUTPATIENT)
Dept: RADIOLOGY | Facility: MEDICAL CENTER | Age: 59
End: 2024-04-03
Attending: EMERGENCY MEDICINE
Payer: MEDICAID

## 2024-04-03 PROCEDURE — 72125 CT NECK SPINE W/O DYE: CPT

## 2024-04-03 PROCEDURE — 73130 X-RAY EXAM OF HAND: CPT | Mod: RT

## 2024-04-03 PROCEDURE — 70450 CT HEAD/BRAIN W/O DYE: CPT

## 2024-04-03 PROCEDURE — 70486 CT MAXILLOFACIAL W/O DYE: CPT

## 2024-04-03 NOTE — ED NOTES
Pt came out of room and told the RN he wants to leave. RN informed the patient to call the doctor and let him know about patient's decision.

## 2024-04-03 NOTE — ED NOTES
Bedside report received from off going RN: Sherly, assumed care of patient.  POC discussed with patient. Pt requesting to be discharged.       Fall risk interventions in place: Not Applicable (all applicable per [place] Fall risk assessment)   Continuous monitoring: Not Applicable  pt refused  IVF/IV medications: Not Applicable   Oxygen: Room Air  Bedside sitter: Not Applicable   Isolation: Not Applicable

## 2024-04-03 NOTE — ED PROVIDER NOTES
"                                                        ED Provider Note    CHIEF COMPLAINT  Chief Complaint   Patient presents with    GLF     ETOH  Laceration on left forehead          HPI    Primary care provider: Pcp Pt States None   History obtained from: Patient  History limited by: Patient refusing to participate with treatment    Jhon Aguirre is a 59 y.o. male who presents to the ED by EMS from the homeless shelter for reported fall shortly prior to arrival.  Patient states that he was \"trying to stand up\" when he lost his balance and \"I fell on my fucking face.\"  No loss of consciousness.  He denies any other injuries.  Patient unsure of his last tetanus shot but refusing to receive tetanus shot in the ED.  He denies shortness of breath/difficulty breathing/nausea/vomiting.  Patient is not on blood thinners.  Limited history/review of systems/physical exam due to patient refusing to cooperate.    REVIEW OF SYSTEMS  Please see HPI for pertinent positives/negatives.  Limited by patient's lack of cooperation.    PAST MEDICAL HISTORY  Past Medical History:   Diagnosis Date    Renal failure 2010    Resolved, due to dehydration    Dental disorder     poor dentations  broken teeth    Pain         SURGICAL HISTORY  Past Surgical History:   Procedure Laterality Date    INGUINAL HERNIA REPAIR  3/14/2014    Performed by David Ramirez M.D. at SURGERY SAME DAY [facility] ORS    ORIF, FRACTURE, RADIUS AND ULNA  10/23/2011    Performed by GERMAN REILLY at SURGERY [facility] ORS    OTHER      broken jaw        SOCIAL HISTORY  Social History     Tobacco Use    Smoking status: Every Day     Current packs/day: 0.50     Average packs/day: 0.5 packs/day for 20.0 years (10.0 ttl pk-yrs)     Types: Cigarettes    Smokeless tobacco: Never   Vaping Use    Vaping Use: Never used   Substance and Sexual Activity    Alcohol use: Yes     Comment: daily    Drug use: Not Currently     Comment: past mj use    Sexual " "activity: Not on file        FAMILY HISTORY  No family history on file.     CURRENT MEDICATIONS  Home Medications    **Home medications have not yet been reviewed for this encounter**          ALLERGIES  No Known Allergies     PHYSICAL EXAM  VITAL SIGNS: Ht 1.778 m (5' 10\")   Wt 49.9 kg (110 lb)   BMI 15.78 kg/m²  @PAULETTE[[id number]::@     Pulse ox interpretation: 100% I interpret this pulse ox as normal     Constitutional: Well developed, well nourished, alert in no apparent distress, nontoxic appearance    HENT: Multiple abrasions/superficial lacerations on the face and nose, normocephalic, bilateral external ears normal, airway patent, no periorbital swelling/bruising, no mastoid swelling/bruising    Eyes: PERRL, conjunctiva without erythema, no discharge, no icterus    Neck: Soft and supple, trachea midline, no stridor, no swelling/bruising, no midline C-spine tenderness, no stepoffs, patient moving his neck without apparent discomfort  Cardiovascular: Regular rate and rhythm, no murmurs/rubs/gallops, strong distal pulses and good perfusion    Thorax & Lungs: No respiratory distress, CTAB with equal BS bilaterally, no chest tenderness  Abdomen: Soft, nontender, nondistended, no G/R, normal BS, pelvis stable    Back: Nontender to palpation  Extremities: No cyanosis, no edema, contracture noted right hand, no tenderness, intact distal pulses with brisk cap refill    Skin: Warm, dry, no pallor/cyanosis, no rash noted except chronic changes  Neuro: A/O to person and refusing to answer other questions regarding place or time, GCS15, no focal deficits noted, moving all 4 extremities and ambulating unassisted in the ED  Psychiatric: Agitated and uncooperative at times, using profanity      DIAGNOSTIC STUDIES / PROCEDURES        LABS  All labs reviewed by me.     Results for orders placed or performed during the hospital encounter of 12/05/23   POC BREATHALIZER   Result Value Ref Range    POC Breathalizer 0.171 (A) 0.00 - " 0.01 Percent        RADIOLOGY  I have independently interpreted the diagnostic imaging associated with this visit and am waiting the final reading from the radiologist.   My preliminary interpretation is as follows: No acute fracture of right hand compared to last x-ray on February 12, 2024.    DX-HAND 3+ RIGHT   Final Result         1.  Fracture of the waist and base of the index finger with evidence of bone remodeling\   2.  No acute traumatic bony injury.   3.  Contracted hand limiting diagnostic sensitivity of this exam.      CT-MAXILLOFACIAL W/O PLUS RECONS   Final Result         1.  No acute traumatic facial bony injuries identified.      CT-CSPINE WITHOUT PLUS RECONS   Final Result         1.  Multilevel degenerative changes of the cervical spine limit diagnostic sensitivity of this examination, otherwise no acute traumatic bony injury of the cervical spine is apparent.   2.  Atherosclerosis      CT-HEAD W/O   Final Result         1.  No acute intracranial abnormality is identified, there are nonspecific white matter changes, commonly associated with small vessel ischemic disease.  Associated mild cerebral atrophy is noted.                COURSE & MEDICAL DECISION MAKING  Nursing notes, VS, PMSFHx reviewed in chart.     Review of past medical records shows the patient was last seen in this ED March 21, 2024 with discharge diagnosis of alcohol intoxication and right arm pain.  Patient was seen in the office by orthopedics on February 12, 2024 for right hand pain, neuropathy of right ulnar nerve, right hand contracture.      Differential diagnoses considered include but are not limited to: Contusion, concussion/post-concussion syndrome, Fx, intracranial hemorrhage, abrasion/laceration, alcohol intoxication, alcoholism      ED Observation Status? Yes; I am placing the patient in to an observation status due to a diagnostic uncertainty as well as therapeutic intensity. Patient placed in observation status at  10:39 PM, 4/2/2024.     Observation plan is as follows: We will obtain imaging studies to monitor patient in the ED.    Upon Reevaluation, the patient's condition has: Remained stable and will be discharged.    Patient discharged from ED Observation status at 0149 on April 3, 2024.      INITIAL ASSESSMENT AND PLAN  Care Narrative: This is a 59-year-old male patient with history of alcoholism and right hand contracture brought in by EMS for reported fall.  Will obtain imaging studies and closely monitor patient in the ED.      Discussion of management with other QHP or appropriate source(s): None     Escalation of care considered, and ultimately not performed: Laboratory analysis and acute inpatient care management, however at this time, the patient is most appropriate for outpatient management.     Barriers to care at this time, including but not limited to: Patient does not have established PCP and Patient is homeless.     Decision tools and prescription drugs considered including, but not limited to: Pain Medications   .      History and physical exam as above.  Patient initially refused interventions including imaging studies.  He appeared to be intoxicated.  He was monitored in the ED and subsequently agreed to have imaging studies performed and he also requested x-rays of the right hand.  Imaging studies without evidence for acute findings as above.  I discussed the findings with the patient.  He is much calmer.  He reports that he did not like the doctor at [facility].  Record review shows that he was seen at [place] [facility] in February regarding right hand pain with contracture and neuropathy.  He was advised on outpatient follow-up.  ED pharmacist checked and patient is up-to-date with his tetanus.  His facial abrasions and superficial lacerations do not require closure.  He was advised on good wound care.  I discussed with him outpatient follow-up and return to ED precautions.  He was advised to  limit his alcohol use and to seek treatment if necessary.  Patient verbalized understanding and agreed with plan of care with no further questions or concerns.      The patient is referred to a primary physician for blood pressure management, diabetic screening, and for all other preventative health concerns.       FINAL IMPRESSION  1. Accidental fall, initial encounter Acute   2. Contusion of face, initial encounter Acute   3. Closed head injury, initial encounter Acute   4. Contracture, right hand Active   5. Abrasion of face, initial encounter Acute   6. Homelessness Active          DISPOSITION  Patient will be discharged home in stable condition.       FOLLOW UP  Franc Mendez M.D.  [address] [facility] [address]  [phone]    Call today      Count includes the [facility] ([facility]) - Primary Care and Family Medicine  [address] [address] [phone]  Call today      [facility] - Primary Care  [address] [facility] [address]  [phone]  Call today      [facility], Emergency Dept  [address] [id number] [phone]    If symptoms worsen         OUTPATIENT MEDICATIONS  Discharge Medication List as of 4/3/2024  1:56 AM             Electronically signed by: Mike Olivas D.O., 4/2/2024 10:38 PM      Portions of this record were made with voice recognition software.  Despite my review, errors may remain.  Please interpret this chart in the appropriate context.

## 2024-04-03 NOTE — ED NOTES
ERP at bedside explained importance of imaging to pt again, pt agreed to go to CT, pt transported to CT.

## 2024-04-03 NOTE — ED TRIAGE NOTES
Chief Complaint   Patient presents with    GLF     ETOH  Laceration on left forehead       Pt BIBA from [facility] for above mentioned complaints. Pt had MGLF and has laceration on his left forehead. Pt denies any LOC and not on  any blood thinner. Pt is refusing for answering any question and non cooperative at the moment.  Pt is agitated and aggressive, verbally being abusive to staff, refused for vitals at the moment.     Vitals from EMS: 155/95, 70b/mnt, sats 99% on RA.

## 2024-04-24 ENCOUNTER — APPOINTMENT (OUTPATIENT)
Dept: RADIOLOGY | Facility: MEDICAL CENTER | Age: 59
End: 2024-04-24
Attending: EMERGENCY MEDICINE
Payer: MEDICAID

## 2024-04-24 ENCOUNTER — HOSPITAL ENCOUNTER (EMERGENCY)
Facility: MEDICAL CENTER | Age: 59
End: 2024-04-24
Attending: EMERGENCY MEDICINE
Payer: MEDICAID

## 2024-04-24 VITALS
WEIGHT: 136 LBS | OXYGEN SATURATION: 97 % | HEIGHT: 70 IN | BODY MASS INDEX: 19.47 KG/M2 | DIASTOLIC BLOOD PRESSURE: 97 MMHG | SYSTOLIC BLOOD PRESSURE: 167 MMHG | RESPIRATION RATE: 18 BRPM | TEMPERATURE: 99 F | HEART RATE: 87 BPM

## 2024-04-24 DIAGNOSIS — S42.291A CLOSED FRACTURE OF HEAD OF RIGHT HUMERUS, INITIAL ENCOUNTER: ICD-10-CM

## 2024-04-24 PROCEDURE — 99284 EMERGENCY DEPT VISIT MOD MDM: CPT

## 2024-04-24 PROCEDURE — 700111 HCHG RX REV CODE 636 W/ 250 OVERRIDE (IP): Mod: UD | Performed by: EMERGENCY MEDICINE

## 2024-04-24 PROCEDURE — 73030 X-RAY EXAM OF SHOULDER: CPT | Mod: RT

## 2024-04-24 PROCEDURE — 73060 X-RAY EXAM OF HUMERUS: CPT | Mod: RT

## 2024-04-24 PROCEDURE — 96372 THER/PROPH/DIAG INJ SC/IM: CPT

## 2024-04-24 RX ORDER — MORPHINE SULFATE 4 MG/ML
4 INJECTION INTRAVENOUS ONCE
Status: COMPLETED | OUTPATIENT
Start: 2024-04-24 | End: 2024-04-24

## 2024-04-24 RX ORDER — HYDROCODONE BITARTRATE AND ACETAMINOPHEN 5; 325 MG/1; MG/1
1 TABLET ORAL EVERY 6 HOURS PRN
Qty: 15 TABLET | Refills: 0 | Status: SHIPPED | OUTPATIENT
Start: 2024-04-24 | End: 2024-04-28

## 2024-04-24 RX ORDER — ONDANSETRON 2 MG/ML
4 INJECTION INTRAMUSCULAR; INTRAVENOUS ONCE
Status: DISCONTINUED | OUTPATIENT
Start: 2024-04-24 | End: 2024-04-24 | Stop reason: HOSPADM

## 2024-04-24 RX ADMIN — MORPHINE SULFATE 4 MG: 4 INJECTION INTRAVENOUS at 09:11

## 2024-04-24 ASSESSMENT — LIFESTYLE VARIABLES
DOES PATIENT WANT TO STOP DRINKING: NO
DO YOU DRINK ALCOHOL: YES

## 2024-04-24 ASSESSMENT — PAIN DESCRIPTION - PAIN TYPE: TYPE: ACUTE PAIN;CHRONIC PAIN

## 2024-04-24 NOTE — ED PROVIDER NOTES
CHIEF COMPLAINT  Chief Complaint   Patient presents with    Fall     BIBA from [facility] after falling into a door and hitting right arm, patient denies head strike, loc or use of blood thinners. Reports previous fracture. Requesting narcotic pain medications.        LIMITATION TO HISTORY   Select: none    HPI    Jhon Aguirre is a 59 y.o. male who presents to the Emergency Department for an acute fall onset prior to arrival. The patient states that he thinks he re-broke his arm. He states that he got dizzy and fell on his right arm. He denies a head strike or loss of consciousness. The patient adds that he could not conrol himself on the way down. Patient has a chronic problem with his right hand. He denies taking any pain medications. Patient denies any allergies to medication.     OUTSIDE HISTORIAN(S):  Select: None    EXTERNAL RECORDS REVIEWED  Select: The patient was seen here on 04/02/24 for a fall and for right hand contracture and old subacute fracture of index finger. He was also seen here on 03/21/24 for alcohol intoxication and right arm pain. The patient had an orthopedic visit on 02/12/24 for right hand pain, neuropathy, and right hand contracture.     PAST MEDICAL HISTORY  Past Medical History:   Diagnosis Date    Dental disorder     poor dentations  broken teeth    Pain     Renal failure 2010    Resolved, due to dehydration     SURGICAL HISTORY  Past Surgical History:   Procedure Laterality Date    INGUINAL HERNIA REPAIR  3/14/2014    Performed by David Ramirez M.D. at SURGERY SAME DAY [facility] ORS    ORIF, FRACTURE, RADIUS AND ULNA  10/23/2011    Performed by GERMAN REILLY at SURGERY [facility] ORS    OTHER      broken jaw     FAMILY HISTORY  No family history noted.     SOCIAL HISTORY  Social History     Tobacco Use    Smoking status: Every Day     Current packs/day: 0.50     Average packs/day: 0.5 packs/day for 20.0 years (10.0 ttl pk-yrs)     Types: Cigarettes    Smokeless  "tobacco: Never   Vaping Use    Vaping Use: Never used   Substance Use Topics    Alcohol use: Yes     Comment: daily    Drug use: Not Currently     Comment: past mj use     CURRENT MEDICATIONS  No current facility-administered medications on file prior to encounter.     Current Outpatient Medications on File Prior to Encounter   Medication Sig Dispense Refill    amoxicillin-clavulanate (AUGMENTIN) 875-125 MG Tab Take 1 Tablet by mouth 2 times a day. 20 Tablet 0    omeprazole (PRILOSEC) 20 MG delayed-release capsule Take 1 Cap by mouth 2 Times a Day. 30 Cap 0    thiamine (THIAMINE) 100 MG tablet Take 1 Tab by mouth every day. 30 Tab 0    multivitamin (THERAGRAN) Tab Take 1 Tab by mouth every day. 30 Tab 0    folic acid (FOLVITE) 1 MG Tab Take 1 Tab by mouth every day. 30 Tab 0    nicotine (NICODERM) 21 MG/24HR PATCH 24 HR Apply 1 Patch to skin as directed every 24 hours. 30 Patch 0     ALLERGIES  No Known Allergies    PHYSICAL EXAM  VITAL SIGNS:BP (!) 144/94   Pulse 80   Temp 36.2 °C (97.2 °F) (Temporal)   Resp 16   Ht 1.778 m (5' 10\")   Wt 61.7 kg (136 lb)   SpO2 97%   BMI 19.51 kg/m²       Constitutional: Disheveled in appearance. No acute distress.   HENT: Normocephalic, Atraumatic, Bilateral external ears normal.  Eyes:  conjunctiva are normal.   Neck: Supple.  Nontender midline  Cardiovascular: Regular rate and rhythm without murmurs gallops or rubs.   Thorax & Lungs: No respiratory distress. Breathing comfortably. Lungs are clear to auscultation bilaterally, there are no wheezes no rales. Chest wall is nontender.  Abdomen: Soft, non distended, non tender   Skin: Warm, Dry, No erythema,   Back: No tenderness, No CVA tenderness.  Musculoskeletal: No clubbing cyanosis or edema good range of motion. Tender about proximal area of the humerus. Limited motion secondary to pain. Contraction to the right hand.    Neurologic: Alert & oriented x 3, normal sensation moving all extremities appears normal "   Psychiatric: Affect normal, Judgment normal, Mood normal.     DIAGNOSTIC STUDIES / PROCEDURES    RADIOLOGY  I have independently interpreted the diagnostic imaging associated with this visit and am waiting the final reading from the radiologist.   My preliminary interpretation is as follows: Acute humeral neck fracture      Radiologist interpretation:   DX-SHOULDER 2+ RIGHT   Final Result      Acute humeral neck fracture with mild comminution and minimal displacement.      No shoulder dislocation.      Significant osteopenia, unusual for age with possible multifocal lucencies. Correlate for history of malignancy versus metabolic abnormality.      DX-HUMERUS 2+ RIGHT   Final Result      Acute humeral neck fracture with mild comminution and minimal displacement.      No distal humeral fracture is definitely seen      Significant osteopenia, unusual for age with possible multifocal lucencies. Correlate for history of malignancy versus metabolic abnormality.           COURSE & MEDICAL DECISION MAKING    ED COURSE:    ED Observation Status? No, The patient does not qualify for observation status     INTERVENTIONS BY ME:  Medications   ondansetron (Zofran) syringe/vial injection 4 mg (0 mg Intravenous Held 4/24/24 0830)   morphine 4 MG/ML injection 4 mg (4 mg Intramuscular Given 4/24/24 0911)       8:06 AM - Patient seen and examined at bedside. The patient presents with right arm pain after a fall earlier today.  See HPI for further details. Discussed plan of care, including ordering imaging to further evaluate. Patient agrees to the plan of care. The patient will be medicated with Zofran 4 mg and morphine 4 mg. Ordered for DX-shoulder 2+ right, DX-humerus 2+ right to evaluate his symptoms.     9:18 AM - Patient was reevaluated at bedside. Discussed radiology results with the patient and informed them that his humerus is fractured. Discussed the plan for discharge in a sling with pain medication and a referral to  Orthopedics. Patient verbalizes understanding and agreement to this plan of care.          INITIAL ASSESSMENT, COURSE AND PLAN  Care Narrative: Patient presents for evaluation of his shoulder.  The patient does have an acute fracture to the shoulder.  The patient is currently homeless.  That is one of our barriers to care.  I will give the patient a sling.  I recommend that he follow-up with orthopedics for further outpatient treatment and care.  Looking at his prior x-rays the same lucencies were seen as well.  I recommended for the patient to follow-up with orthopedics for further evaluation of this.  Patient should return if any symptoms worsen.  I will give the patient a prescription of pain medications.        ADDITIONAL PROBLEM LIST  None    DISPOSITION AND DISCUSSIONS    Escalation of care considered, and ultimately not performed:None    Barriers to care at this time, including but not limited to: Patient is homeless.     I reviewed prescription monitoring program for patient's narcotic use before prescribing a scheduled drug.The patient will not drink alcohol nor drive with prescribed medications. The patient will return for new or worsening symptoms and is stable at the time of discharge.    The patient is referred to a primary physician for blood pressure management, diabetic screening, and for all other preventative health concerns.    I reviewed prescription monitoring program for patient's narcotic use before prescribing a scheduled drug.The patient will not drink alcohol nor drive with prescribed medications      In prescribing controlled substances to this patient, I certify that I have obtained and reviewed the medical history this patient I have also made a good andreina effort to obtain applicable records from other providers who have treated the patient and records did not demonstrate any increased risk of substance abuse that would prevent me from prescribing controlled substances.     I have  conducted a physical exam and documented it. I have reviewed Mr. Aguirre’s prescription history as maintained by the [place] Prescription Monitoring Program.     I have assessed the patient’s risk for abuse, dependency, and addiction using the validated Opioid Risk Tool available at https://www.mdcalc.com/sldtgj-soqb-jiqt-ort-narcotic-abuse.     Given the above, I believe the benefits of controlled substance therapy outweigh the risks. The reasons for prescribing controlled substances include in my professional opinion, controlled substances are a reasonable choice for this patient. Accordingly, I have discussed the risk and benefits, treatment plan, and alternative therapies with the patient. The patient has been consented for the medication and understands the risks.     DISPOSITION:  Patient will be discharged home in stable condition.    FOLLOW UP:  Jose Antonio Thompson R.N.  [facility] Emergency  [id number]  [facility] [address]  [phone]          OUTPATIENT MEDICATIONS:  Discharge Medication List as of 4/24/2024  9:47 AM        START taking these medications    Details   HYDROcodone-acetaminophen (NORCO) 5-325 MG Tab per tablet Take 1 Tablet by mouth every 6 hours as needed (pain) for up to 4 days., Disp-15 Tablet, R-0, Normal            FINAL DIAGNOSIS  1. Closed fracture of head of right humerus, initial encounter       IEliane), am scribing for, and in the presence of, Joe Tarango M.D..    Electronically signed by: Eliane Elder), 4/24/2024    IJoe M.D. personally performed the services described in this documentation, as scribed by Eliane Tarango in my presence, and it is both accurate and complete.     Electronically signed by: Joe Tarango M.D.,10:21 AM 04/24/24

## 2024-04-24 NOTE — ED NOTES
"PT. To room from [facility], changed into gown.  Pt. Expressing frustration with staff over having to change into gown while \"I am in too much pain for this\".  States that last night at 0300 \"I got up to go to the bathroom and I tripped and hit my arm on the door, I don't need an x-ray, I know it's fractured.  I know what broken bones feel like, I have am just getting over this arm being broken 6 weeks ago.\"  Offered pt. A blanket, he states \"I AM NOT COLD.\"  Call light in reach.  Bed locked in low position.  Awaiting ERP eval.     "

## 2024-04-24 NOTE — ED NOTES
Pt. Able to ambulate out of ED with steady gait.  Verbalized understanding of need to f/u with orthopedics and also states that he will walk up to the pharmacy to  rx a this time.  No acute distress noted.

## 2024-04-24 NOTE — ED TRIAGE NOTES
"Chief Complaint   Patient presents with    Fall     BIBA from [facility] after falling into a door and hitting right arm, patient denies head strike, loc or use of blood thinners. Reports previous fracture. Requesting narcotic pain medications.        BP (!) 144/94   Pulse 80   Temp 36.2 °C (97.2 °F) (Temporal)   Resp 16   Ht 1.778 m (5' 10\")   Wt 49.9 kg (110 lb)   SpO2 97%     Patient educated on ed triage process, instructed to notify staff of any new or worsening symptoms, verbalizes understanding. Patient returned to ed lobby, apologized for wait times.     "

## 2024-04-28 ENCOUNTER — APPOINTMENT (OUTPATIENT)
Dept: RADIOLOGY | Facility: MEDICAL CENTER | Age: 59
End: 2024-04-28
Attending: STUDENT IN AN ORGANIZED HEALTH CARE EDUCATION/TRAINING PROGRAM
Payer: MEDICAID

## 2024-04-28 ENCOUNTER — HOSPITAL ENCOUNTER (EMERGENCY)
Facility: MEDICAL CENTER | Age: 59
End: 2024-04-29
Attending: STUDENT IN AN ORGANIZED HEALTH CARE EDUCATION/TRAINING PROGRAM
Payer: MEDICAID

## 2024-04-28 DIAGNOSIS — F10.920 ALCOHOLIC INTOXICATION WITHOUT COMPLICATION (HCC): ICD-10-CM
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
## 2024-04-28 PROCEDURE — 70450 CT HEAD/BRAIN W/O DYE: CPT

## 2024-04-28 PROCEDURE — 99284 EMERGENCY DEPT VISIT MOD MDM: CPT

## 2024-04-28 ASSESSMENT — PAIN DESCRIPTION - PAIN TYPE: TYPE: ACUTE PAIN

## 2024-04-29 VITALS
BODY MASS INDEX: 20.61 KG/M2 | HEART RATE: 70 BPM | DIASTOLIC BLOOD PRESSURE: 72 MMHG | RESPIRATION RATE: 16 BRPM | OXYGEN SATURATION: 95 % | SYSTOLIC BLOOD PRESSURE: 130 MMHG | HEIGHT: 68 IN | WEIGHT: 136 LBS | TEMPERATURE: 98 F

## 2024-04-29 PROCEDURE — A9270 NON-COVERED ITEM OR SERVICE: HCPCS | Mod: UD | Performed by: STUDENT IN AN ORGANIZED HEALTH CARE EDUCATION/TRAINING PROGRAM

## 2024-04-29 PROCEDURE — 700102 HCHG RX REV CODE 250 W/ 637 OVERRIDE(OP): Mod: UD | Performed by: STUDENT IN AN ORGANIZED HEALTH CARE EDUCATION/TRAINING PROGRAM

## 2024-04-29 RX ORDER — ACETAMINOPHEN 500 MG
1000 TABLET ORAL ONCE
Status: DISCONTINUED | OUTPATIENT
Start: 2024-04-29 | End: 2024-04-29 | Stop reason: HOSPADM

## 2024-04-29 RX ORDER — IBUPROFEN 600 MG/1
600 TABLET ORAL ONCE
Status: DISCONTINUED | OUTPATIENT
Start: 2024-04-29 | End: 2024-04-29 | Stop reason: HOSPADM

## 2024-04-29 NOTE — ED NOTES
Pt sleeping in [facility] comfortably. No acute distress noted. Call light within reach.

## 2024-04-29 NOTE — ED NOTES
"Attempted to medicated patient with pain medication. Pt reports \"you call that pain medication, fuck your tylenol\". Pt refusing to sign discharge paperwork. Ambulated to lobby with steady gait.   "

## 2024-04-29 NOTE — ED NOTES
"Pt yelling at this RN and states \"I don't need this fucking alarm\" when bed alarm notifying staff pt is attempting to get out of bed. Pt educated on bed alarm policy for safety due to intoxication. No evidence of learning. Bed alarm remains in place.   "

## 2024-04-29 NOTE — ED PROVIDER NOTES
ED Provider Note    CHIEF COMPLAINT  Chief Complaint   Patient presents with    T-5000 GLF     BIBA from [facility] for GLF. +head strike, -LOC, -thinners. Pt arrives GCS 15. Abrasion to R forehead noted. Pt arrives with sling to R arm from previous fall resulting in fx.   +etoh        EXTERNAL RECORDS REVIEWED  External ED Note ED visit from 4/25/2024 noted history of chronic alcohol use observed until clinically sober    HPI/ROS  LIMITATION TO HISTORY     OUTSIDE HISTORIAN(S):      Jhon Aguirre is a 59 y.o. male who presents after ground-level fall.  Patient says he was drinking throughout the evening tripped and fell.  Patient says he has been having ongoing pain in his right arm where he fractured his humerus.  Patient denies other recent illness or trauma.    PAST MEDICAL HISTORY   has a past medical history of Dental disorder, Pain, and Renal failure (2010).    SURGICAL HISTORY   has a past surgical history that includes other; orif, fracture, radius and ulna (10/23/2011); and inguinal hernia repair (3/14/2014).    FAMILY HISTORY  History reviewed. No pertinent family history.    SOCIAL HISTORY  Social History     Tobacco Use    Smoking status: Every Day     Current packs/day: 0.50     Average packs/day: 0.5 packs/day for 20.0 years (10.0 ttl pk-yrs)     Types: Cigarettes    Smokeless tobacco: Never   Vaping Use    Vaping Use: Never used   Substance and Sexual Activity    Alcohol use: Yes     Comment: daily    Drug use: Not Currently     Comment: past mj use    Sexual activity: Not on file       CURRENT MEDICATIONS  Home Medications       Reviewed by Ann Holland R.N. (Registered Nurse) on 04/28/24 at 2153  Med List Status: Partial     Medication Last Dose Status   amoxicillin-clavulanate (AUGMENTIN) 875-125 MG Tab  Active   folic acid (FOLVITE) 1 MG Tab  Active   HYDROcodone-acetaminophen (NORCO) 5-325 MG Tab per tablet  Active   multivitamin (THERAGRAN) Tab  Active   nicotine  "(NICODERM) 21 MG/24HR PATCH 24 HR  Active   omeprazole (PRILOSEC) 20 MG delayed-release capsule  Active   thiamine (THIAMINE) 100 MG tablet  Active                    ALLERGIES  No Known Allergies    PHYSICAL EXAM  VITAL SIGNS: /72   Pulse 70   Temp 36.7 °C (98 °F) (Temporal)   Resp 16   Ht 1.727 m (5' 8\")   Wt 61.7 kg (136 lb)   SpO2 95%   BMI 20.68 kg/m²    Constitutional: Alert in no apparent distress.  HENT: Abrasion across right forehead, Bilateral external ears normal, Nose normal.   Eyes: Pupils are equal and reactive, Conjunctiva normal, Non-icteric.   Neck: Normal range of motion, No tenderness, Supple, No stridor.   Cardiovascular: Regular rate and rhythm, no murmurs.   Thorax & Lungs: Normal breath sounds, No respiratory distress, No wheezing, No chest tenderness.   Abdomen: Bowel sounds normal, Soft, No tenderness, No masses, No pulsatile masses.   Skin: Warm, Dry, No erythema, No rash.   Back: No bony tenderness, No CVA tenderness.   Extremities: Intact distal pulses, No edema, No tenderness, No cyanosis  Musculoskeletal: Right arm sling in place, tenderness throughout right upper extremity, soft compartments, normal neurovascular exam of right upper extremity otherwise good range of motion in all major joints. No tenderness to palpation or major deformities noted.   Neurologic: Alert , Normal motor function, Normal sensory function, No focal deficits noted.           RADIOLOGY/PROCEDURES   I have independently interpreted the diagnostic imaging associated with this visit and am waiting the final reading from the radiologist.   My preliminary interpretation is as follows: No intracranial hemorrhage      Radiologist interpretation:  CT-HEAD W/O   Final Result         1.  No acute intracranial abnormality is identified, there are nonspecific white matter changes, commonly associated with small vessel ischemic disease.  Associated mild cerebral atrophy is noted.             COURSE & MEDICAL " DECISION MAKING    ASSESSMENT, COURSE AND PLAN  Care Narrative: On arrival vital signs within normal limits.  Patient appears intoxicated with dysarthria fluid motor coordination admitted alcohol use.  Patient does have signs of head trauma given his intoxication will obtain CT imaging to assess for traumatic injury.  Patient does not have any signs of neurovascular compromise or compartment syndrome regarding his previous humeral fracture.    CT imaging without acute abnormality.  Patient slept for most of observation.  On reassessment patient with clear speech able to ambulate tolerate p.o. without difficulty.  Patient says that he drank too much last night.  Patient has no specific complaints.  Patient was given Tylenol and ibuprofen for right arm pain.  Based on my reassessment patient is clinically sober and is stable for discharge.          ADDITIONAL PROBLEMS MANAGED      DISPOSITION AND DISCUSSIONS  Barriers to care at this time, including but not limited to: Patient does not have established PCP and Patient is homeless.         FINAL DIAGNOSIS  1. Fall, initial encounter    2. Alcoholic intoxication without complication (HCC)           Electronically signed by: Olu Arriola D.O., 4/28/2024 9:58 PM

## 2024-04-29 NOTE — ED NOTES
Pt getting out of bed. Bed alarm going off. Pt becoming agitated with staff. Security called. ERP at bedside

## 2024-04-29 NOTE — ED TRIAGE NOTES
"Chief Complaint   Patient presents with    T-5000 GLF     BIBA from [facility] for GLF. +head strike, -LOC, -thinners. Pt arrives GCS 15. Abrasion to R forehead noted. Pt arrives with sling to R arm from previous fall resulting in fx.   +etoh        /89   Pulse 82   Temp 36.4 °C (97.5 °F) (Temporal)   Resp 18   Ht 1.727 m (5' 8\")   Wt 61.7 kg (136 lb)   SpO2 94%   BMI 20.68 kg/m²       "

## 2024-04-30 ENCOUNTER — TELEPHONE (OUTPATIENT)
Dept: SCHEDULING | Facility: IMAGING CENTER | Age: 59
End: 2024-04-30
Payer: MEDICAID

## 2024-05-03 ENCOUNTER — PHARMACY VISIT (OUTPATIENT)
Dept: PHARMACY | Facility: MEDICAL CENTER | Age: 59
End: 2024-05-03
Payer: COMMERCIAL

## 2024-05-03 PROCEDURE — RXMED WILLOW AMBULATORY MEDICATION CHARGE: Performed by: EMERGENCY MEDICINE

## 2024-05-07 ENCOUNTER — PHARMACY VISIT (OUTPATIENT)
Dept: PHARMACY | Facility: MEDICAL CENTER | Age: 59
End: 2024-05-07
Payer: COMMERCIAL

## 2024-05-07 PROCEDURE — RXMED WILLOW AMBULATORY MEDICATION CHARGE: Performed by: PHYSICIAN ASSISTANT

## 2024-05-13 ENCOUNTER — HOSPITAL ENCOUNTER (EMERGENCY)
Facility: MEDICAL CENTER | Age: 59
End: 2024-05-13
Attending: EMERGENCY MEDICINE
Payer: MEDICAID

## 2024-05-13 VITALS
HEIGHT: 70 IN | TEMPERATURE: 97.8 F | OXYGEN SATURATION: 95 % | SYSTOLIC BLOOD PRESSURE: 99 MMHG | DIASTOLIC BLOOD PRESSURE: 76 MMHG | BODY MASS INDEX: 19.33 KG/M2 | WEIGHT: 135 LBS | RESPIRATION RATE: 16 BRPM | HEART RATE: 84 BPM

## 2024-05-13 DIAGNOSIS — G89.29 CHRONIC RIGHT SHOULDER PAIN: ICD-10-CM

## 2024-05-13 DIAGNOSIS — M25.511 CHRONIC RIGHT SHOULDER PAIN: ICD-10-CM

## 2024-05-13 DIAGNOSIS — F10.929 ALCOHOLIC INTOXICATION WITH COMPLICATION (HCC): ICD-10-CM

## 2024-05-14 ENCOUNTER — HOSPITAL ENCOUNTER (EMERGENCY)
Facility: MEDICAL CENTER | Age: 59
End: 2024-05-14
Attending: EMERGENCY MEDICINE
Payer: MEDICAID

## 2024-05-14 VITALS
SYSTOLIC BLOOD PRESSURE: 141 MMHG | RESPIRATION RATE: 15 BRPM | BODY MASS INDEX: 21.69 KG/M2 | WEIGHT: 135 LBS | OXYGEN SATURATION: 95 % | DIASTOLIC BLOOD PRESSURE: 92 MMHG | TEMPERATURE: 97.7 F | HEIGHT: 66 IN | HEART RATE: 92 BPM

## 2024-05-14 DIAGNOSIS — F10.20 ALCOHOLISM (HCC): ICD-10-CM

## 2024-05-14 DIAGNOSIS — F10.920 ALCOHOLIC INTOXICATION WITHOUT COMPLICATION (HCC): ICD-10-CM
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
## 2024-05-14 LAB — POC BREATHALIZER: 0.27 PERCENT (ref 0–0.01)
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
## 2024-05-14 NOTE — ED NOTES
"Bedside report received from off going RN/tech: Sherin, assumed care of patient.  POC discussed with patient. Call light within reach, all needs addressed at this time.       Fall risk interventions in place: Move the patient closer to the nurse's station, Patient's personal possessions are with in their safe reach, Place fall risk sign on patient's door, Give patient urinal if applicable, and Keep floor surfaces clean and dry (all applicable per [facility] Fall risk assessment)   Continuous monitoring: Not Applicable   IVF/IV medications: Not Applicable   Oxygen: Room Air  Bedside sitter: Not Applicable   Isolation: Not Applicable    /77   Pulse 84   Temp 36.6 °C (97.8 °F) (Temporal)   Resp 16   Ht 1.778 m (5' 10\")   Wt 61.2 kg (135 lb)   SpO2 95%  - RA  "

## 2024-05-14 NOTE — ED NOTES
All belongings with pt. Pt ambulated to lobby unassisted with steady gait for DC. Patient refused all teaching or paperwork.

## 2024-05-14 NOTE — ED NOTES
"Patient ambulated independently with slow, steady gait.  ERP updated and patient ready for discharge at this time.      Patient agitated with staff that he is being discharged. Patient told trauma RN, \"You can kiss my little white ass.\" Security on standby   "

## 2024-05-15 ENCOUNTER — PATIENT OUTREACH (OUTPATIENT)
Dept: SCHEDULING | Facility: IMAGING CENTER | Age: 59
End: 2024-05-15
Payer: MEDICAID

## 2024-05-15 NOTE — ED NOTES
Pt ambulated down the keyes with no assistance, steady gate but had to be redirected back to [facility]

## 2024-05-15 NOTE — ED NOTES
AVS discussed with patient. Pt ambulatory to lobby with steady gait. MTM noel called for patient back to [facility]

## 2024-05-15 NOTE — ED PROVIDER NOTES
"                                                        ED Provider Note    CHIEF COMPLAINT  Chief Complaint   Patient presents with    GLF    Alcohol Intoxication     Pt bib ems, pt had a witness GLF. Pt denies HS, -thinners, but does endorse drinking a pint and a half of vodka today. Per ems pt uncooperative and pt refusing a hospital wristband upon placement to the ER        HPI    Primary care provider: Pcp Pt States None   History obtained from: Patient  History limited by: None     Jhon Aguirre is a 59 y.o. male who presents to the ED by EMS after a witnessed ground-level fall.  Patient unsure if he hit his head.  He denies loss of consciousness.  He denies any pain.  Denies shortness of breath/difficulty breathing/nausea/vomiting/weakness or sensory change.  He reports drinking \"zero\" alcohol today.  He currently has no complaints and states \"thanks for giving me so much attention.\"    REVIEW OF SYSTEMS  Please see HPI for pertinent positives/negatives.  All other systems reviewed and are negative.     PAST MEDICAL HISTORY  Past Medical History:   Diagnosis Date    Renal failure 2010    Resolved, due to dehydration    Dental disorder     poor dentations  broken teeth    Pain         SURGICAL HISTORY  Past Surgical History:   Procedure Laterality Date    INGUINAL HERNIA REPAIR  3/14/2014    Performed by David Ramirez M.D. at SURGERY SAME DAY [facility] ORS    ORIF, FRACTURE, RADIUS AND ULNA  10/23/2011    Performed by GERMAN REILLY at SURGERY [facility] ORS    OTHER      broken jaw        SOCIAL HISTORY  Social History     Tobacco Use    Smoking status: Every Day     Current packs/day: 0.50     Average packs/day: 0.5 packs/day for 20.0 years (10.0 ttl pk-yrs)     Types: Cigarettes    Smokeless tobacco: Never   Vaping Use    Vaping Use: Never used   Substance and Sexual Activity    Alcohol use: Yes     Comment: daily    Drug use: Not Currently     Comment: past mj use    Sexual activity: Not " "on file        FAMILY HISTORY  History reviewed. No pertinent family history.     CURRENT MEDICATIONS  Home Medications       Reviewed by Wally Pena R.N. (Registered Nurse) on 05/14/24 at 1846  Med List Status: Not Addressed     Medication Last Dose Status   amoxicillin-clavulanate (AUGMENTIN) 875-125 MG Tab  Active   folic acid (FOLVITE) 1 MG Tab  Active   HYDROcodone-acetaminophen (NORCO) 5-325 MG Tab per tablet  Active   multivitamin (THERAGRAN) Tab  Active   nicotine (NICODERM) 21 MG/24HR PATCH 24 HR  Active   omeprazole (PRILOSEC) 20 MG delayed-release capsule  Active   thiamine (THIAMINE) 100 MG tablet  Active                     ALLERGIES  No Known Allergies     PHYSICAL EXAM  VITAL SIGNS: BP (!) 141/92   Pulse 92   Temp 36.5 °C (97.7 °F) (Temporal)   Resp 15   Ht 1.676 m (5' 6\")   Wt 61.2 kg (135 lb)   SpO2 95%   BMI 21.79 kg/m²  @[id number]::@     Pulse ox interpretation: 95% I interpret this pulse ox as normal     Constitutional: Well developed, well nourished, alert in no apparent distress, nontoxic appearance    HENT: No external signs of trauma except apparent healing scars near the right eyebrow, normocephalic, bilateral external ears normal, airway patent, nose non TTP with no hematoma/bleeding/drainage, midface stable, no malocclusion, no periorbital swelling/bruising, no mastoid swelling/bruising    Eyes: PERRL, EOMI, conjunctiva without erythema, no discharge, no icterus    Neck: Soft and supple, trachea midline, no stridor, no swelling/bruising, no midline C-spine tenderness, no stepoffs, patient moving his neck without apparent discomfort  Cardiovascular: Regular rate and rhythm, no murmurs/rubs/gallops, strong distal pulses and good perfusion    Thorax & Lungs: No respiratory distress, CTAB with equal BS bilaterally, no chest tenderness  Abdomen: Soft, nontender, nondistended, no G/R, normal BS, pelvis stable    Back: Nontender to palpation  Extremities: No cyanosis, right upper " extremity in sling due to recent humerus fracture, no edema, intact distal pulses with brisk cap refill    Skin: Warm, dry, no pallor/cyanosis, no rash noted    Neuro: A/O times 3, GCS15, no focal deficits noted  Psychiatric: Cooperative, no signs of active hallucinations or delusions      DIAGNOSTIC STUDIES / PROCEDURES        LABS  All labs reviewed by me.     Results for orders placed or performed during the hospital encounter of 05/14/24   POC BREATHALIZER   Result Value Ref Range    POC Breathalizer 0.271 (A) 0.00 - 0.01 Percent        RADIOLOGY  I have independently interpreted the diagnostic imaging associated with this visit and am waiting the final reading from the radiologist.     No orders to display          COURSE & MEDICAL DECISION MAKING  Nursing notes, VS, PMSFHx reviewed in chart.     Review of past medical records shows the patient has had multiple ED visits including here and at [facility].  Patient was last seen in this ED yesterday for alcohol intoxication.  Patient was seen in the office by orthopedics on May 6, 2024 regarding right shoulder pain and right humeral neck fracture.  Patient was previously seen in this ED on April 28, 2024 for fall and alcohol intoxication.  Patient was last seen at [facility] ED on April 25, 2024 for alcohol intoxication.      Differential diagnoses considered include but are not limited to: Contusion, concussion/post-concussion syndrome, Fx, intracranial hemorrhage, alcoholism, alcohol intoxication      ED Observation Status? Yes; I am placing the patient in to an observation status due to a diagnostic uncertainty as well as therapeutic intensity. Patient placed in observation status at 6:17 PM, 5/14/2024.     Observation plan is as follows: Patient refusing imaging studies.  Will closely monitor in the ED until clinically sober for appropriate disposition.    Upon Reevaluation, the patient's condition has: Improved; and will be discharged.    Patient  discharged from ED Observation status at 1950 on May 14, 2024.       INITIAL ASSESSMENT AND PLAN  Care Narrative: This is a 59-year-old male patient well-known to the ED due to multiple past ED visits mostly for alcohol intoxication and brought in by EMS to the ED today after a witnessed fall.  I have seen the patient previously and his exam today appears stable and similar to past visit.  He is refusing imaging studies including CT head and C-spine.  Will monitor patient in the ED closely and perform imaging if any change in clinical status.  Once patient clinically sober, I believe patient can be safely discharged.      Discussion of management with other QHP or appropriate source(s): None     Escalation of care considered, and ultimately not performed: after discussion with the patient / family, they have elected to decline an escalation in care, Laboratory analysis, diagnostic imaging, and acute inpatient care management, however at this time, the patient is most appropriate for outpatient management.     Barriers to care at this time, including but not limited to: Patient does not have established PCP, Patient is homeless, and Patient lacks transportation .     Decision tools and prescription drugs considered including, but not limited to: Medication modification   .        History and physical exam as above.  Despite patient stating that he drank no alcohol today, his alcohol level in the ED is 271.  I suspect that this is fairly close to his baseline.  He declined any imaging or laboratory studies.  He was monitored in the ED and remained clinically stable.  He tolerated oral intake without difficulty.  He is able to ambulate unassisted without difficulty.  He is not exhibiting hallucinations or delusions or evidence for alcohol withdrawal.  He does not have focal neurological findings.  I was informed by ED nurse that patient is requesting discharge before the homeless shelter closes.  On reevaluation,  patient is alert, jovial and pleasant, in no acute distress and nontoxic in appearance.  I believe patient is safe for discharge to the homeless shelter.  He was strongly advised to limit his alcohol use and to seek treatment for his alcoholism.  He was advised on outpatient follow-up and given return to ED precautions.  He was also noted with elevated blood pressure readings for which he can follow-up on outpatient basis for further management.  Patient verbalized understanding and agreed with plan of care with no further questions or concerns.      The patient is referred to a primary physician for blood pressure management, diabetic screening, and for all other preventative health concerns.       FINAL IMPRESSION  1. Alcoholism (HCC) Active   2. Alcoholic intoxication without complication (HCC) Active   3. Fall, initial encounter Acute          DISPOSITION  Patient will be discharged to homeless shelter in stable condition.       FOLLOW UP  [facility] ([facility]) - Primary Care and Family Medicine  [address] [address]  [phone]  Call in 1 day      [facility] - Primary Care  [address] [facility] [address]  [phone]  Call in 1 day      [facility], Emergency Dept  [address] [id number] [phone]    If symptoms worsen         OUTPATIENT MEDICATIONS  Discharge Medication List as of 5/14/2024  7:44 PM             Electronically signed by: Mike Olivas D.O., 5/14/2024 6:16 PM      Portions of this record were made with voice recognition software.  Despite my review, errors may remain.  Please interpret this chart in the appropriate context.

## 2024-05-15 NOTE — ED TRIAGE NOTES
Chief Complaint   Patient presents with    GLF    Alcohol Intoxication     Pt bib ems, pt had a witness GLF. Pt denies HS, -thinners, but does endorse drinking a pint and a half of vodka today. Per ems pt uncooperative and pt refusing a hospital wristband upon placement to the ER

## 2024-05-15 NOTE — ED NOTES
Break RN  Vitals obtained. Breathalyzer resulted 0.271. Patient provided sandwich, denies any other needs.

## 2024-05-16 ENCOUNTER — PATIENT OUTREACH (OUTPATIENT)
Dept: SCHEDULING | Facility: IMAGING CENTER | Age: 59
End: 2024-05-16
Payer: MEDICAID

## 2024-05-17 ENCOUNTER — PATIENT OUTREACH (OUTPATIENT)
Dept: SCHEDULING | Facility: IMAGING CENTER | Age: 59
End: 2024-05-17
Payer: MEDICAID

## 2024-05-20 ENCOUNTER — HOSPITAL ENCOUNTER (EMERGENCY)
Facility: MEDICAL CENTER | Age: 59
End: 2024-05-21
Attending: STUDENT IN AN ORGANIZED HEALTH CARE EDUCATION/TRAINING PROGRAM
Payer: MEDICAID

## 2024-05-20 ENCOUNTER — APPOINTMENT (OUTPATIENT)
Dept: RADIOLOGY | Facility: MEDICAL CENTER | Age: 59
End: 2024-05-20
Attending: STUDENT IN AN ORGANIZED HEALTH CARE EDUCATION/TRAINING PROGRAM
Payer: MEDICAID

## 2024-05-20 DIAGNOSIS — S09.90XA CLOSED HEAD INJURY, INITIAL ENCOUNTER: ICD-10-CM
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
## 2024-05-20 DIAGNOSIS — F10.920 ALCOHOLIC INTOXICATION WITHOUT COMPLICATION (HCC): ICD-10-CM

## 2024-05-20 RX ORDER — HALOPERIDOL 5 MG/ML
5 INJECTION INTRAMUSCULAR ONCE
Status: COMPLETED | OUTPATIENT
Start: 2024-05-20 | End: 2024-05-20

## 2024-05-20 RX ORDER — LORAZEPAM 2 MG/ML
1 INJECTION INTRAMUSCULAR ONCE
Status: COMPLETED | OUTPATIENT
Start: 2024-05-20 | End: 2024-05-20

## 2024-05-20 RX ORDER — DIPHENHYDRAMINE HYDROCHLORIDE 50 MG/ML
50 INJECTION INTRAMUSCULAR; INTRAVENOUS ONCE
Status: DISCONTINUED | OUTPATIENT
Start: 2024-05-20 | End: 2024-05-20

## 2024-05-20 RX ORDER — DIPHENHYDRAMINE HYDROCHLORIDE 50 MG/ML
50 INJECTION INTRAMUSCULAR; INTRAVENOUS ONCE
Status: COMPLETED | OUTPATIENT
Start: 2024-05-20 | End: 2024-05-20

## 2024-05-20 RX ADMIN — LORAZEPAM 1 MG: 2 INJECTION INTRAMUSCULAR; INTRAVENOUS at 22:16

## 2024-05-20 RX ADMIN — HALOPERIDOL LACTATE 5 MG: 5 INJECTION, SOLUTION INTRAMUSCULAR at 21:30

## 2024-05-20 RX ADMIN — DIPHENHYDRAMINE HYDROCHLORIDE 50 MG: 50 INJECTION, SOLUTION INTRAMUSCULAR; INTRAVENOUS at 21:30

## 2024-05-21 VITALS
RESPIRATION RATE: 12 BRPM | HEART RATE: 72 BPM | DIASTOLIC BLOOD PRESSURE: 93 MMHG | OXYGEN SATURATION: 96 % | WEIGHT: 134.92 LBS | TEMPERATURE: 98.7 F | HEIGHT: 66 IN | BODY MASS INDEX: 21.68 KG/M2 | SYSTOLIC BLOOD PRESSURE: 151 MMHG

## 2024-05-21 NOTE — ED NOTES
Pt walking down hallway with stead gait. PAR reports pt was intentionally being evasive walking around RN station trying to avoid RN. RN speaks to pt walking in green pod. Pt refusing to be seen by ERP and refusing any discharge paperwork or teaching. Pt alert and oriented with clear speech. Pt ambulates to lobby. Pt bed alarm still in place. Pt had ripped of bracelet and thrown trash on floor in room.

## 2024-05-21 NOTE — ED NOTES
Pt placed in posey vest and soft wrist restraints. Pt continues to swear at staff and is attempting to get off of gurney. Security at bedside. Pt continues to be altered to situation and states 911 was called because he was a harm to himself.

## 2024-05-21 NOTE — ED PROVIDER NOTES
ED Provider Note    Assumed care from Dr. Rainey. He presents after being seen to fall and hit his head. He says he was drinking alcohol. CT of head done and no traumatic injury. He is currently metabolizing.   Luke Colmenares II, M.D. 5/21/2024 1:59 AM

## 2024-05-21 NOTE — ED NOTES
Patient returned from imaging. Pt wrist restraints removed. [place] vest remains on, but not tied to bed. Pt is alert to verbal, mumbles and pulls at EKG leads when RN attempts to replace. Pt then goes back to sleep. Bed alarm remains in use. Pt placed back to monitor, and pulse oximeter, auto BP cuff.

## 2024-05-21 NOTE — ED NOTES
Pt sits to edge of bed and refuses to sit back to bed. Pt is agitated and verbally abusive. Security at bedside. Pt redirected to wait for MD. Pt agrees. ERP notified.

## 2024-05-21 NOTE — ED NOTES
Pt is refusing to be placed on cardia monitor. Pt becomes verbally aggressive when RN attempts to reason with him. Security at bedside.

## 2024-05-21 NOTE — DISCHARGE SUMMARY
"  ED Observation Discharge Summary    Patient:Jhon Cobb [facility]  Patient : 1965  Patient MRN: [id number]  Patient PCP: Pcp Pt States None    Admit Date: 2024  Discharge Date and Time: 24 5:50 AM  Discharge Diagnosis:   1. Fall, initial encounter    2. Closed head injury, initial encounter    3. Alcoholic intoxication without complication (HCC)        Discharge Attending: Luke Colmenares II, M.D.  Discharge Service: ED Observation    ED Course  Jhon is a 59 y.o. male who was evaluated at [facility] emergency department.  Noted with a closed head injury and is showing signs consistent with intoxication.  CT of the head and C-spine did not reveal any acute injuries.  He was very agitated and had to be sedated with Haldol, Benadryl, Ativan.  He was monitored for several hours until he reached to sober state.  He was later awake, ambulatory with steady gait refused repeat vital signs but accepted sandwich and some water.  He did not want to wait for his discharge paperwork and walked out of the ER.    Discharge Exam:  BP (!) 151/93   Pulse 72   Temp 37.1 °C (98.7 °F) (Temporal)   Resp 12   Ht 1.676 m (5' 5.98\")   Wt 61.2 kg (134 lb 14.7 oz)   SpO2 96%   BMI 21.79 kg/m² .    Constitutional: Resting comfortably, easy to awaken  HENT:  Grossly normal  Eyes: Grossly normal  Cardiovascular: Normal heart rate   Thorax & Lungs: No respiratory distress  Psychiatric: Affect normal    Labs  Results for orders placed or performed during the hospital encounter of 24   POC BREATHALIZER   Result Value Ref Range    POC Breathalizer 0.271 (A) 0.00 - 0.01 Percent       Radiology  CT-CSPINE WITHOUT PLUS RECONS   Final Result         1.  Multilevel degenerative changes of the cervical spine limit diagnostic sensitivity of this examination, otherwise no acute traumatic bony injury of the cervical spine is apparent.   2.  Atherosclerosis      CT-HEAD W/O   Final Result         1.  No acute " intracranial abnormality is identified, there are nonspecific white matter changes, commonly associated with small vessel ischemic disease.  Associated mild cerebral atrophy is noted.   2.  Left maxillary sinusitis changes             Medications:   New Prescriptions    No medications on file       My final assessment includes   1. Fall, initial encounter    2. Closed head injury, initial encounter    3. Alcoholic intoxication without complication (HCC)        Upon Reevaluation, the patient's condition has: Improved; and will be discharged.    Patient discharged from ED Observation status at 05/21/24 5:50 AM    Total time spent on this ED Observation discharge encounter is < 30 Minutes    Electronically signed by: Luke Colmenares II, M.D., 5/21/2024 5:56 AM

## 2024-05-21 NOTE — ED NOTES
Pt sleeping. Bed alarm in place. Active chest rise and fall noted. On cardiac monitor, pulse oximeter.

## 2024-05-21 NOTE — ED NOTES
"Pt awoken to voice. Pt alert to person, place, year and has clear speech. Pt states, \"I don't want to re-live it\" when asked about event. Pt states he wants to leave. Pt stands unassisted. Upon ambulating, pt puts head forward and charges forward with unsteady gait. Pt swaying side to side when standing. Pt ambulates back to bed. Pt refusing all vital signs. Pt given sandwich, water and a urinal. Bed alarm remains in use. Call light within reach.   "

## 2024-05-21 NOTE — DISCHARGE INSTRUCTIONS
You had a CT scan of your head and neck that showed no evidence of injury. Please follow up with your regular doctors. Return to the ER for any worsening symptoms.

## 2024-05-21 NOTE — ED NOTES
Pt sleeping. Active chest rise and fall noted. On bed alarm, cardiac monitor, pulse oximeter, auto BP.

## 2024-05-21 NOTE — ED TRIAGE NOTES
"Chief Complaint   Patient presents with    Fall     Pt had two ground level falls at [facility]. Fall # 1 pt hit head to bed, -LOC, -thinners. EMS responded and left scene. EMS was called back because pt was being uncooperative with staff and fell again with assistance. No LOC.      BIB EMS. EMS reports pt was A & O x 4, has been drinking alcohol. VS: 131/67, Resp 18, SPO2 99% RA, GCS 15, .     Pt denies all pain. Denies alcohol use. Alert to person & place. Pt right arm in sling from previous injury.     /84   Pulse 81   Temp 37.1 °C (98.7 °F) (Temporal)   Resp 18   Ht 1.676 m (5' 5.98\")   Wt 61.2 kg (134 lb 14.7 oz)   SpO2 98%   BMI 21.79 kg/m²     "

## 2024-05-21 NOTE — ED NOTES
Pt sleeping left lateral side. Active chest rise and fall noted. Respirations equal and unlabored. Call light within reach.

## 2024-05-21 NOTE — ED NOTES
Pt lying left lateral position, sleeping. Active chest rise and fall. On cardiac monitor and pulse oximeter.

## 2024-05-21 NOTE — ED PROVIDER NOTES
ED Provider Note    CHIEF COMPLAINT  Chief Complaint   Patient presents with    Fall     Pt had two ground level falls at [facility]. Fall # 1 pt hit head to bed, -LOC, -thinners. EMS responded and left scene. EMS was called back because pt was being uncooperative with staff and fell again with assistance. No LOC.        EXTERNAL RECORDS REVIEWED  Outpatient Notes patient was seen for closed right humeral neck fracture at [facility] May 6, 2024 and his injuries are nonsurgical and patient should be wearing a sling.  He was given Norco.  Patient has multiple ER visits at this ER and in the ER for evaluation alcohol intoxication    HPI/ROS  LIMITATION TO HISTORY   Select: Intoxication  OUTSIDE HISTORIAN(S):  EMS    Jhon Aguirre is a 59 y.o. male who presents for evaluation after a fall.  [facility] the [facility] when he apparently fell and hit his head on the bed.  He did not lose consciousness.  He is not on any blood thinners.  EMS evaluated the patient and then they left but the patient was on cooperative with staff and he fell again therefore EMS was called again.  He was brought to the emergency room in restraints.  Patient has history of alcohol use and he states that he has been drinking today.  He denies any headache, neck pain, back pain, chest pain, abdominal pain.  He states that he recently broke his arm and states that this pain is at baseline.  Patient cannot tell me what happened today and does not know where he is and he is quite agitated.    PAST MEDICAL HISTORY   has a past medical history of Dental disorder, Pain, and Renal failure (2010).    SURGICAL HISTORY   has a past surgical history that includes other; orif, fracture, radius and ulna (10/23/2011); and inguinal hernia repair (3/14/2014).    FAMILY HISTORY  History reviewed. No pertinent family history.    SOCIAL HISTORY  Social History     Tobacco Use    Smoking status: Every Day     Current packs/day: 0.50      "Average packs/day: 0.5 packs/day for 20.0 years (10.0 ttl pk-yrs)     Types: Cigarettes    Smokeless tobacco: Never   Vaping Use    Vaping status: Never Used   Substance and Sexual Activity    Alcohol use: Yes     Comment: daily    Drug use: Not Currently     Comment: past mj use    Sexual activity: Not on file       CURRENT MEDICATIONS  Home Medications       Reviewed by Kinsey Arriola R.N. (Registered Nurse) on 05/20/24 at 2109  Med List Status: Partial     Medication Last Dose Status   amoxicillin-clavulanate (AUGMENTIN) 875-125 MG Tab  Active   folic acid (FOLVITE) 1 MG Tab  Active   multivitamin (THERAGRAN) Tab  Active   nicotine (NICODERM) 21 MG/24HR PATCH 24 HR  Active   omeprazole (PRILOSEC) 20 MG delayed-release capsule  Active   thiamine (THIAMINE) 100 MG tablet  Active                    ALLERGIES  No Known Allergies    PHYSICAL EXAM  VITAL SIGNS: /84   Pulse 81   Temp 37.1 °C (98.7 °F) (Temporal)   Resp 18   Ht 1.676 m (5' 5.98\")   Wt 61.2 kg (134 lb 14.7 oz)   SpO2 98%   BMI 21.79 kg/m²      Constitutional: Well developed, Well nourished, No acute respiratory distress, Non-toxic appearance.  Disheveled  HENT: Normocephalic, Atraumatic, Bilateral external ears normal, Oropharynx clear, mucous membranes are moist.  Eyes: Conjunctiva normal, No discharge. No icterus.  Neck: Normal range of motion. Supple.  Cardiovascular: Normal heart rate, Normal rhythm, No murmurs, No rubs, No gallops.   Thorax & Lungs: Clear to auscultation bilaterally, No respiratory distress, No wheezing.  Abdomen: Soft nontender normal bowel sounds no rebound masses or peritoneal signs  Skin: Warm, Dry, No erythema, No rash.   Extremities: Intact distal pulses, Right upper extremity in sling, able to move all fingers  Neurologic: Alert & oriented to person but not to place or situation, moving all 4 extremities without difficulty  Psych: Agitated, aggressive      RADIOLOGY/PROCEDURES   I have independently interpreted " the diagnostic imaging associated with this visit and am waiting the final reading from the radiologist.   My preliminary interpretation is as follows: no ICH    Radiologist interpretation:  CT-CSPINE WITHOUT PLUS RECONS   Final Result         1.  Multilevel degenerative changes of the cervical spine limit diagnostic sensitivity of this examination, otherwise no acute traumatic bony injury of the cervical spine is apparent.   2.  Atherosclerosis      CT-HEAD W/O   Final Result         1.  No acute intracranial abnormality is identified, there are nonspecific white matter changes, commonly associated with small vessel ischemic disease.  Associated mild cerebral atrophy is noted.   2.  Left maxillary sinusitis changes             COURSE & MEDICAL DECISION MAKING    ASSESSMENT, COURSE AND PLAN  Care Narrative:   This is a 59-year-old male who is well-known to this department who presents after he fell.  On arrival his vital signs are stable.  He has a known history of alcohol use disorder and presented to the emergency room intoxicated several times.  He is agitated, aggressive.  He wants to leave however he is not alert and oriented and is intoxicated.  Due to history of head trauma, cannot rule out ICH.  Because of this and because patient poses a danger to himself given his agitation, history of head trauma, he was provided IM sedation and unfortunately had to be restrained as he kept trying to leave but was unable to to tell me his reasoning why he wanted to leave.  CT head and C-spine were obtained and shows no evidence of ICH, skull fracture, neck fracture.  I did consider laboratory evaluation however he has no acute medical complaints and he has normal vital signs therefore I do not think that labs are absolutely necessary.    ED OBS: Yes; I am placing the patient in to an observation status due to a diagnostic uncertainty as well as therapeutic intensity. Patient placed in observation status at 09:16 PM,  5/21/2024.     Observation plan is as follows: imaging, metabolization of substances    1:25 AM patient remains sleeping.  He will need to metabolize alcohol and his sedation here therefore he will be signed out to oncoming provider, Dr. Colmenares, who will watch him as he metabolizes alcohol and disposition appropriately.        ADDITIONAL PROBLEMS MANAGED  Alcohol intoxication  Homelessness    DISPOSITION AND DISCUSSIONS  I have discussed management of the patient with the following physicians and ARELIS's:    Oncoming ERP - Dr. Colmenares    Discussion of management with other [facility] or appropriate source(s): None     Escalation of care considered, and ultimately not performed:Laboratory analysis    Barriers to care at this time, including but not limited to: Patient is homeless.     Decision tools and prescription drugs considered including, but not limited to:  None .    FINAL DIAGNOSIS  1. Fall, initial encounter    2. Closed head injury, initial encounter    3. Alcoholic intoxication without complication (HCC)           Electronically signed by: Marifer Rainey M.D., 5/20/2024 9:16 PM

## 2024-05-21 NOTE — ED NOTES
Pt sits to edge of bed again and is attempting to stand up. Pt uncooperative and verbally aggressive. ERP at bedside.

## 2024-06-19 ENCOUNTER — HOSPITAL ENCOUNTER (EMERGENCY)
Facility: MEDICAL CENTER | Age: 59
End: 2024-06-19
Attending: EMERGENCY MEDICINE
Payer: MEDICAID

## 2024-06-19 VITALS
DIASTOLIC BLOOD PRESSURE: 80 MMHG | BODY MASS INDEX: 21.68 KG/M2 | HEART RATE: 77 BPM | HEIGHT: 66 IN | OXYGEN SATURATION: 99 % | TEMPERATURE: 97.2 F | WEIGHT: 134.92 LBS | RESPIRATION RATE: 19 BRPM | SYSTOLIC BLOOD PRESSURE: 120 MMHG

## 2024-06-19 DIAGNOSIS — F10.929 ALCOHOLIC INTOXICATION WITH COMPLICATION (HCC): ICD-10-CM

## 2024-06-19 DIAGNOSIS — M25.521 CHRONIC ELBOW PAIN, RIGHT: ICD-10-CM

## 2024-06-19 DIAGNOSIS — G89.29 CHRONIC ELBOW PAIN, RIGHT: ICD-10-CM

## 2024-06-19 PROCEDURE — 99284 EMERGENCY DEPT VISIT MOD MDM: CPT

## 2024-06-19 NOTE — DISCHARGE SUMMARY
"  ED Observation Discharge Summary    Patient:Jhon Cobb [facility]  Patient : 1965  Patient MRN: [id number]  Patient PCP: Pcp Pt States None    Admit Date: 2024  Discharge Date and Time: 24 5:48 AM  Discharge Diagnosis: Acute alcohol intoxication  Discharge Attending: João Mccoy D.O.  Discharge Service: ED Observation    ED Course  Jhon is a 59 y.o. male who was evaluated at [facility] for evaluation of acute alcohol intoxication.  Patient does have a prior history of alcohol abuse.  Was apparently told by police he could either go to half-way or come to the emergency department.  He was unable to ambulate with a steady gait thus he was brought here.  He was observed in the emergency department for 5 and half hours and prior to discharge was awake alert answering questions appropriate ambulating with a steady gait with no further complaints.  Was verbally aggressive towards staff, though otherwise was stable for discharge he was discharged in a stable condition.    Discharge Exam:  /66   Pulse 76   Resp 20   Ht 1.676 m (5' 6\")   Wt 61.2 kg (134 lb 14.7 oz)   SpO2 98%   BMI 21.78 kg/m² .    Constitutional: Awake and alert. Nontoxic  HENT:  Grossly normal  Eyes: Grossly normal  Neck: Normal range of motion  Cardiovascular: Normal heart rate   Thorax & Lungs: No respiratory distress  Abdomen: Nontender  Skin:  No pathologic rash.   Extremities: Well perfused  Psychiatric: Affect normal    Labs  Results for orders placed or performed during the hospital encounter of 24   POC BREATHALIZER   Result Value Ref Range    POC Breathalizer 0.271 (A) 0.00 - 0.01 Percent       Radiology  No orders to display       Medications:   New Prescriptions    No medications on file       My final assessment includes acute alcohol intoxication  Upon Reevaluation, the patient's condition has: Improved; and will be discharged.    Patient discharged from ED Observation status at 0550 (Time)  " (Date).     Total time spent on this ED Observation discharge encounter is < 30 Minutes    Electronically signed by: João Mccoy D.O., 6/19/2024 5:48 AM

## 2024-06-19 NOTE — ED TRIAGE NOTES
"Chief Complaint   Patient presents with    Alcohol Intoxication     Pt BIBA for alcohol intoxication, initially verbally aggressive w/ EMS on scene     BP (!) 146/90   Pulse 70   Resp 20   Ht 1.676 m (5' 6\")   Wt 61.2 kg (134 lb 14.7 oz)   SpO2 96%   BMI 21.78 kg/m²     Pt BIBA for above cc while on scene of another call  EMS reported pt was continuing to call for them and trying to get up- +ETOH  Hx of ETOH, pt initially verbally aggressive w/ EMS on scene   PD was also on scene for unrelated, pt go to ER or longterm  EMS POC     GCS 14  "

## 2024-06-19 NOTE — ED NOTES
Fall risk precautions in place  Bed alarm on and armed  Discussed w/ pt proper behavior when being in ED and treating staff  Pt verbalizes understanding

## 2024-06-19 NOTE — ED NOTES
Road test done. Patient able to stand up and ambulate but with unsteady gait. Patient back to bed, sleeping.

## 2024-06-19 NOTE — ED NOTES
===============================  Date today is 2/12/2024  Hilary Mack is a 71 y.o. female  Last visit [facility]: :1/22/2024   Last visit eye dept. 1/22/2024    Uncorrected distance visual acuity was CF@5' in the right eye and 20/30 - in the left eye.  Tonometry       Tonometry (Applanation, 1:26 PM)         Right Left    Pressure 13 12                  Not recorded       Not recorded       Not recorded       Chief Complaint   Patient presents with    Annual Exam     Pt has not had cat eval with [facility]  Pt co OD>OS, increased foggy and warped va OD  No pain  No fol  No floaters  Pt using cosopt bid OU and AT's PRN OU     HPI     Annual Exam     Additional comments: Pt has not had cat eval with [facility]  Pt co OD>OS, increased foggy and warped va OD  No pain  No fol  No floaters  Pt using cosopt bid OU and AT's PRN OU           Comments    DM w/DME   Avastin OD 2/3/20 , 3/5/2020   Began eylea od 7/1/2020-- last 10/2/2020  Eylea OD 4/14/22, 5/19/22, 6/23/22, 7/20/22, 8/19/22  Pred forte trial from 8/19/2022 till 9/22/22  Ozurdex OD # 9/21/22, 8/29/23, 11/8/23       Cosopt bid ou          Last edited by Troy Sexton on 2/12/2024  1:24 PM.      Problem List Items Addressed This Visit          Eye/Vision problems    Type 2 diabetes mellitus with both eyes affected by mild nonproliferative retinopathy and macular edema, with long-term current use of insulin - Primary    Overview              Relevant Orders    Prior authorization Order    Posterior Segment OCT Retina-Both eyes (Completed)     Other Visit Diagnoses       Diabetic cataract of right eye              Instructed to call 24/7 for any worsening of vision, visual distortion or pain.  Check OU independently daily.    Gave my office and personal cell phone number.  ________________  2/12/2024 today  Hilary Mack    :DM with DME  Last visit 32 on drops   Ozurdex steroid responder   Follow for- now drops until out      ((((8/23 was 20 /40 now this)))          OD 2-3+  Pt woke up and does not want leave. Security called.    PSC - symptomatic  Recommend cataract eval, expect vision to improve  Will need preop injection    RTC with Dr. Sharma for CE eval, will need preop injection  Instructed to call 24/7 for any worsening of vision or symptoms. Check OU daily.   Gave my office and cell phone number.    ============ =================

## 2024-06-19 NOTE — ED PROVIDER NOTES
"ED Provider Note    CHIEF COMPLAINT  Chief Complaint   Patient presents with    Alcohol Intoxication     Pt BIBA for alcohol intoxication, initially verbally aggressive w/ EMS on scene     EXTERNAL RECORDS REVIEWED  ER note from 5/20 where the patient had been seen for injuries sustained from a fall as he was coming from [facility] and was acutely intoxicated.  Patient was signed out to the oncoming physician for metabolization of his intoxicants.  During most recent evaluation he had no signs of significant traumatic injury, he was disheveled in appearance and a CT scan of the head and neck which did not show any acute injury.  He was placed in ops status and no labs were obtained as his vital signs were unremarkable.    HPI/ROS  LIMITATION TO HISTORY   Select: Intoxication  OUTSIDE HISTORIAN(S):  none    Jhon Aguirre is a 59 y.o. male who presents to the emergency room for concerns of alcohol intoxication.  The patient does not know why he is here, he was told by police apparently that he had to go to [facility] or to the emergency department and as an ambulance crew was nearby he walked over and asked to be taken here.  He is visibly intoxicated, he is slightly disheveled and has urinated through his pants.  He denies any recent trauma and says that the only thing that is bothering him is his right arm for which he has a prior fractured elbow.  He says he occasionally falls and aggravates it and does not want to have any further x-rays on it because he \"knows it is fucking broken.\"    Patient denies any headaches, no vomiting of blood, no dark bowel movements.  He is asking for food and drink and is too intoxicated to stand at the bedside.  His blood pressure is normal and he has no tachycardia and he is denying any other acute constitutional complaints.  He does not take blood thinners    Blood sugar on arrival is 106.    PAST MEDICAL HISTORY   has a past medical history of Alcohol abuse, Dental " "disorder, Pain, and Renal failure (2010).    SURGICAL HISTORY   has a past surgical history that includes other; orif, fracture, radius and ulna (10/23/2011); and inguinal hernia repair (3/14/2014).    FAMILY HISTORY  History reviewed. No pertinent family history.    SOCIAL HISTORY  Social History     Tobacco Use    Smoking status: Every Day     Current packs/day: 0.50     Average packs/day: 0.5 packs/day for 20.0 years (10.0 ttl pk-yrs)     Types: Cigarettes    Smokeless tobacco: Never   Vaping Use    Vaping status: Never Used   Substance and Sexual Activity    Alcohol use: Yes     Comment: daily    Drug use: Not Currently     Comment: past mj use    Sexual activity: Not on file       CURRENT MEDICATIONS  Home Medications       Reviewed by Gina Apodaca R.N. (Registered Nurse) on 06/19/24 at 0012  Med List Status: Partial     Medication Last Dose Status   amoxicillin-clavulanate (AUGMENTIN) 875-125 MG Tab  Active   folic acid (FOLVITE) 1 MG Tab  Active   multivitamin (THERAGRAN) Tab  Active   nicotine (NICODERM) 21 MG/24HR PATCH 24 HR  Active   omeprazole (PRILOSEC) 20 MG delayed-release capsule  Active   thiamine (THIAMINE) 100 MG tablet  Active                    ALLERGIES  No Known Allergies    PHYSICAL EXAM  VITAL SIGNS: /66   Pulse 76   Resp 20   Ht 1.676 m (5' 6\")   Wt 61.2 kg (134 lb 14.7 oz)   SpO2 98%   BMI 21.78 kg/m²    Genl: M sitting in rney comfortably, speaking slurred, appears in no acute distress   Head: NC/AT   ENT: Mucous membranes moist, posterior pharynx clear, uvula midline, nares patent bilaterally   Eyes: Normal sclera, pupils equal round reactive to light  Neck: Supple, FROM, no LAD appreciated   Pulmonary: Lungs are clear to auscultation bilaterally  Chest: No TTP  CV:  RRR, no murmur appreciated, pulses 2+ in both upper and lower extremities,  Abdomen: soft, NT/ND; no rebound/guarding, no masses palpated, no HSM   Musculoskeletal: Pain free ROM of the neck. Moving " upper and lower extremities in spontaneous and coordinated fashion.  Patient has a chronic contracture of the right upper extremity, has a Band-Aid over some abrasions on the right elbow.  No fluctuant drainage, no erythema or warmth.  Neuro: A&Ox4 (person, place, time, situation), speech is slurred, appears grossly intoxicated.  Unable to walk.  No focal deficits    EKG/LABS  Labs Reviewed - No data to display    RADIOLOGY/PROCEDURES   Prior ER imaging is reviewed.    COURSE & MEDICAL DECISION MAKING    ASSESSMENT, COURSE AND PLAN  Care Narrative: Patient presents emergency room for symptoms as described above.  The patient is visibly intoxicated, has history of prior elbow injury and does not want x-rays at this time.  He has areas of skin that are somewhat darkened because of overexposure but no erythema or fluctuant changes nor is he have signs of secondary infection.  He has no areas of focal tenderness and is refusing any further workup.  He is to intoxicated at this time to be discharged safely and is allowed to metabolize his intoxicants and will be p.o. challenged.    Observation plan is as follows:Observation until patient is able to safely be reassessed and discharged with resources for homelessness.    ED OBS: Yes; I am placing the patient in to an observation status due to a diagnostic uncertainty as well as therapeutic intensity. Patient placed in observation status at 12:40 AM, 6/19/2024.       FINAL DIAGNOSIS  1. Alcoholic intoxication with complication (HCC)    2. Chronic elbow pain, right         Electronically signed by: Oswald Patel M.D., 6/19/2024 12:18 AM

## 2024-06-19 NOTE — ED NOTES
"Pt verbally aggressive with this tech when prompted to do road test to test for steady gait and ambulation. RN aware. According to pt \"It's going to take security to take me out of here. I don't even want to be here\".    "

## 2024-06-24 ENCOUNTER — HOSPITAL ENCOUNTER (EMERGENCY)
Facility: MEDICAL CENTER | Age: 59
End: 2024-06-24
Attending: EMERGENCY MEDICINE
Payer: MEDICAID

## 2024-06-24 VITALS
BODY MASS INDEX: 21.63 KG/M2 | SYSTOLIC BLOOD PRESSURE: 144 MMHG | RESPIRATION RATE: 16 BRPM | WEIGHT: 134 LBS | HEART RATE: 98 BPM | OXYGEN SATURATION: 95 % | DIASTOLIC BLOOD PRESSURE: 70 MMHG | TEMPERATURE: 97.8 F

## 2024-06-24 DIAGNOSIS — F10.920 ALCOHOLIC INTOXICATION WITHOUT COMPLICATION (HCC): ICD-10-CM

## 2024-06-24 DIAGNOSIS — R46.89 AGGRESSIVE BEHAVIOR OF ADULT: ICD-10-CM

## 2024-06-24 PROCEDURE — 99284 EMERGENCY DEPT VISIT MOD MDM: CPT

## 2024-06-24 ASSESSMENT — LIFESTYLE VARIABLES
CONSUMPTION TOTAL: INCOMPLETE
TOTAL SCORE: 4
TOTAL SCORE: 4
EVER FELT BAD OR GUILTY ABOUT YOUR DRINKING: YES
HAVE YOU EVER FELT YOU SHOULD CUT DOWN ON YOUR DRINKING: YES
DO YOU DRINK ALCOHOL: YES
EVER HAD A DRINK FIRST THING IN THE MORNING TO STEADY YOUR NERVES TO GET RID OF A HANGOVER: YES
TOTAL SCORE: 4
HAVE PEOPLE ANNOYED YOU BY CRITICIZING YOUR DRINKING: YES

## 2024-06-24 NOTE — ED TRIAGE NOTES
"Chief Complaint   Patient presents with    Alcohol Intoxication     BIB EMS, per EMS pt has been drinking heavily. Pt unable to verbalize what brought him in to the ER today. When Questioned he states \"fix my problems!\"  When questioned further what specific problem he would like addressed pt states \"you are pissing me off.\"    "

## 2024-06-24 NOTE — ED NOTES
Pt discharge home. Pt given discharge instructions but refused questions answered ,vss upon d/c.Gait at baseline.

## 2024-06-24 NOTE — ED PROVIDER NOTES
"ED Provider Note    CHIEF COMPLAINT  Chief Complaint   Patient presents with    Alcohol Intoxication       HPI  Jhon Aguirre is a 59 y.o. male who presents for evaluation of alcohol intoxication.  Patient was apparently in by EMS after being called to the street where the patient was found to be unable to walk.  He was slurring his speech and felt to be intoxicated.  Patient notes that he did not want to be brought here and was uncooperative in route refusing vital signs.  He is calm currently but stating that he does not feel he needs to be here.  He denied any pain and has not had any recent medical problems aside from drinking too much.  He did reiterate that \"I do not want to blame it on any one thing,\" and went on to say that he abuses many different substances including alcohol.  Patient notes that he does want to leave and feels he is able to walk.  EXTERNAL RECORDS REVIEWED  Reviewed last ED visit on the 19th of this month for similar circumstances.  Patient is noted to have right elbow pain but this is not new.  He has fallen recently but has been evaluated on previous ED visits.  ROS  Constitutional: No recent fevers  Skin: Right elbow abrasion, old  HEENT: Denies sore throat  Neck: Denies neck pain  Chest: Denies chest pain  Pulm: No shortness of breath or cough  Gastrointestinal: No nausea, vomiting, or abdominal pain.  Musculoskeletal: Mild pain to right elbow.  Mild amount of swelling with the scabbed area.  Neurologic: Denies numbness, tingling, or motor weakness to extremities  Psych: Denies any self-harm.          LIMITATION TO HISTORY   Intoxication  OUTSIDE HISTORIAN(S):  EMS transport crew        PAST FAM HISTORY  No family history on file.    PAST MEDICAL HISTORY   has a past medical history of Alcohol abuse, Dental disorder, Pain, and Renal failure (2010).    SOCIAL HISTORY  Social History     Tobacco Use    Smoking status: Every Day     Current packs/day: 0.50     Average " packs/day: 0.5 packs/day for 20.0 years (10.0 ttl pk-yrs)     Types: Cigarettes    Smokeless tobacco: Never   Vaping Use    Vaping status: Never Used   Substance and Sexual Activity    Alcohol use: Yes     Comment: daily    Drug use: Not Currently     Comment: past mj use    Sexual activity: Not on file       SURGICAL HISTORY   has a past surgical history that includes other; orif, fracture, radius and ulna (10/23/2011); and inguinal hernia repair (3/14/2014).    CURRENT MEDICATIONS  Home Medications       Reviewed by Cecily Newton R.N. (Registered Nurse) on 06/24/24 at 1441  Med List Status: Partial     Medication Last Dose Status   amoxicillin-clavulanate (AUGMENTIN) 875-125 MG Tab  Active   folic acid (FOLVITE) 1 MG Tab  Active   multivitamin (THERAGRAN) Tab  Active   nicotine (NICODERM) 21 MG/24HR PATCH 24 HR  Active   omeprazole (PRILOSEC) 20 MG delayed-release capsule  Active   thiamine (THIAMINE) 100 MG tablet  Active                     ALLERGIES  No Known Allergies    PHYSICAL EXAM  VITAL SIGNS: BP (!) 144/70   Pulse 98   Temp 36.6 °C (97.8 °F) (Temporal)   Resp 16   Wt 60.8 kg (134 lb)   SpO2 95%   BMI 21.63 kg/m²    Gen: Appears tired, disheveled, poor hygiene, breath smells strongly of alcohol metabolites  HEENT: No signs of trauma, Bilateral external ears normal, Nose normal. Conjunctiva normal, Non-icteric.  Pupils equal  Neck: Patient noted to be ranging neck without distress or limitation.  Cardiovascular: Regular rate and rhythm Capillary refill less than 3 seconds to all extremities, 2+ distal pulses.  Thorax & Lungs: Normal breath sounds, No respiratory distress, No wheezing bilateral chest rise  Abdomen: No distention  Skin: Warm, Dry  Extremities: Scabbed area right elbow  Neurologic: Appears tired, no facial droop, slightly slurred speech.  Able to demonstrate orientation to person, place, time, and situation.    INITIAL IMPRESSION  Patient arrives for evaluation of what appears to  be alcohol intoxication although by his own admission, he states he uses other substances as well .  He has no findings to suggest new trauma and specifically does not have any obvious head injuries.  He does not complain of pain and is oriented.  He is slurring his speech and does smell of alcohol metabolites indicating alcohol intoxication at the very least.  Regardless, the patient does not want to be in the emergency department and I feel he maintains the capacity to make and understand his own decisions as well as the repercussions of those decisions.  His judgment is clearly very poor as he has had several visits for the same issue but I do not feel labs or imaging will benefit him or  today.  Provided he can tolerate p.o.'s and ambulate without apparent difficulty, I feel he will be dischargeable safely, poor judgment not withstanding.    ED observation? No          ASSESSMENT, COURSE AND PLAN  Care Narrative: Patient was able to demonstrate standing and ambulating and became somewhat aggressive when we attempted to dress the scabbed area on his right elbow.  He was quickly placated by a [place] [employer] officer who happened to be several rooms down for another issue.  Patient then calm down and stated he wanted to leave after he got something to eat.  At this point I feel it is perfectly reasonable as his primary issue appears to be substance abuse once again.            ADDITIONAL PROBLEMS MANAGED      I have discussed management of the patient with the following physicians and ARELIS's: None    Escalation of care considered, and ultimately not performed:    Barriers to care at this time, including but not limited to: .     Decision tools and Rx drugs considered including, but not limited to :     Discussion of management with other [facility] or appropriate source(s):     The patient will not drink alcohol nor drive with prescribed medications. The patient will return for worsening symptoms and is stable at  the time of discharge. The patient verbalizes understanding and will comply.    FINAL IMPRESSION  1. Alcoholic intoxication without complication (HCC)    2. Aggressive behavior of adult        Electronically signed by: Nilton Mejía M.D., 6/24/2024 3:41 PM

## 2024-07-04 ENCOUNTER — HOSPITAL ENCOUNTER (EMERGENCY)
Facility: MEDICAL CENTER | Age: 59
End: 2024-07-04
Attending: EMERGENCY MEDICINE
Payer: MEDICAID

## 2024-07-04 VITALS
WEIGHT: 130 LBS | TEMPERATURE: 98.4 F | RESPIRATION RATE: 14 BRPM | SYSTOLIC BLOOD PRESSURE: 135 MMHG | DIASTOLIC BLOOD PRESSURE: 81 MMHG | OXYGEN SATURATION: 98 % | HEART RATE: 58 BPM | HEIGHT: 66 IN | BODY MASS INDEX: 20.89 KG/M2

## 2024-07-04 DIAGNOSIS — F10.920 ALCOHOLIC INTOXICATION WITHOUT COMPLICATION (HCC): ICD-10-CM

## 2024-07-04 PROCEDURE — 99284 EMERGENCY DEPT VISIT MOD MDM: CPT

## 2024-07-14 ENCOUNTER — HOSPITAL ENCOUNTER (EMERGENCY)
Facility: MEDICAL CENTER | Age: 59
End: 2024-07-14
Payer: MEDICAID

## 2024-07-14 VITALS
RESPIRATION RATE: 18 BRPM | WEIGHT: 130 LBS | HEART RATE: 83 BPM | TEMPERATURE: 96.7 F | DIASTOLIC BLOOD PRESSURE: 99 MMHG | OXYGEN SATURATION: 98 % | BODY MASS INDEX: 20.89 KG/M2 | HEIGHT: 66 IN | SYSTOLIC BLOOD PRESSURE: 139 MMHG

## 2024-07-14 PROCEDURE — 302449 STATCHG TRIAGE ONLY (STATISTIC)

## 2024-08-08 ENCOUNTER — DOCUMENTATION (OUTPATIENT)
Dept: MEDICAL GROUP | Facility: CLINIC | Age: 59
End: 2024-08-08
Payer: MEDICAID

## 2024-08-08 NOTE — PROGRESS NOTES
[facility]  [address]  Encounter 8/7/24    -60yo M w/ mental health issues, aggressive behavior, who was directed to me by Court staff w/ chx wound of rt lateral epicondyle region of rt elbow  -has been going on for months, on/off, worsening  -was hospitalized at [facility] for 2d for tx, but left premise as he was concerned about his belongings being lost at the [facility]  -his rt arm has significant contracture, unknown etiolgy  -reports no fevers/chills, n/v, cp, sob  -the wound drains pus and bleeds    VSS; afebrile  Gen: pt somewhat agitated  Ext: right lateral epicondyle/elbow region has large wound extending proximal forearm and distal arm; wound is dirty w/ yellow crusting and scabs, surrounding area is very erythematous and warm and tender to palpations; significant purulent drainage at wound site    A/P  60yo M w/ chx wound likely c/b underlying abscess and cellulitis; wound per pts hx is contained to local area and not spreading at this time and no s/s of systemic infection.   -at this time pt advised to proceed to ED, and extensively educated on possible risks such as worsening of infection, sepsis, OM; pt understands and acknowledges but is adamant he will go tomorrow and can't go today  -I will provide him with a 2d rx of keflex 500mg bid until he goes to the ED tomoro; pt agreeable to this plan  -I also washed the wound w/ saline and wrapped it with a bandage as to prevent drainage and worsening contamination  -pt at one point became agitated and was directed by the [address] staff to his proceeding which will include him getting care for his arm; the court will attempt to ensure his belongings are accounted for if he becomes hospitalized  -immediate ED precautions given for s/s of systemic infection

## 2024-09-24 ENCOUNTER — TELEPHONE (OUTPATIENT)
Dept: HEALTH INFORMATION MANAGEMENT | Facility: OTHER | Age: 59
End: 2024-09-24

## 2024-09-30 ENCOUNTER — APPOINTMENT (OUTPATIENT)
Dept: WOUND CARE | Facility: MEDICAL CENTER | Age: 59
End: 2024-09-30
Payer: MEDICAID

## 2024-10-10 ENCOUNTER — APPOINTMENT (OUTPATIENT)
Dept: WOUND CARE | Facility: MEDICAL CENTER | Age: 59
End: 2024-10-10
Payer: MEDICAID

## 2024-10-15 ENCOUNTER — APPOINTMENT (OUTPATIENT)
Dept: WOUND CARE | Facility: MEDICAL CENTER | Age: 59
End: 2024-10-15
Payer: MEDICAID

## 2024-11-30 ENCOUNTER — APPOINTMENT (OUTPATIENT)
Dept: RADIOLOGY | Facility: MEDICAL CENTER | Age: 59
End: 2024-11-30
Attending: STUDENT IN AN ORGANIZED HEALTH CARE EDUCATION/TRAINING PROGRAM
Payer: MEDICAID

## 2024-11-30 ENCOUNTER — HOSPITAL ENCOUNTER (EMERGENCY)
Facility: MEDICAL CENTER | Age: 59
End: 2024-12-01
Attending: STUDENT IN AN ORGANIZED HEALTH CARE EDUCATION/TRAINING PROGRAM
Payer: MEDICAID

## 2024-11-30 DIAGNOSIS — S09.90XA CLOSED HEAD INJURY, INITIAL ENCOUNTER: ICD-10-CM

## 2024-11-30 DIAGNOSIS — S01.81XA FACIAL LACERATION, INITIAL ENCOUNTER: ICD-10-CM

## 2024-11-30 DIAGNOSIS — R46.89 AGGRESSIVE BEHAVIOR: ICD-10-CM

## 2024-11-30 PROCEDURE — 99285 EMERGENCY DEPT VISIT HI MDM: CPT

## 2024-11-30 PROCEDURE — 70450 CT HEAD/BRAIN W/O DYE: CPT

## 2024-11-30 PROCEDURE — 72125 CT NECK SPINE W/O DYE: CPT

## 2024-11-30 PROCEDURE — 70486 CT MAXILLOFACIAL W/O DYE: CPT

## 2024-11-30 PROCEDURE — 96375 TX/PRO/DX INJ NEW DRUG ADDON: CPT | Mod: XU

## 2024-11-30 PROCEDURE — 304992 HCHG REPAIR-COMPLEX-LVL 1,1.1-7.5CM

## 2024-11-30 PROCEDURE — 303747 HCHG EXTRA SUTURE

## 2024-11-30 PROCEDURE — 700111 HCHG RX REV CODE 636 W/ 250 OVERRIDE (IP): Mod: UD

## 2024-11-30 PROCEDURE — 96374 THER/PROPH/DIAG INJ IV PUSH: CPT | Mod: XU

## 2024-11-30 PROCEDURE — 700101 HCHG RX REV CODE 250: Mod: UD | Performed by: STUDENT IN AN ORGANIZED HEALTH CARE EDUCATION/TRAINING PROGRAM

## 2024-11-30 PROCEDURE — 304217 HCHG IRRIGATION SYSTEM

## 2024-11-30 RX ORDER — LORAZEPAM 2 MG/ML
1 INJECTION INTRAMUSCULAR ONCE
Status: COMPLETED | OUTPATIENT
Start: 2024-11-30 | End: 2024-11-30

## 2024-11-30 RX ORDER — LORAZEPAM 2 MG/ML
1 INJECTION INTRAMUSCULAR ONCE
Status: DISCONTINUED | OUTPATIENT
Start: 2024-11-30 | End: 2024-12-01 | Stop reason: HOSPADM

## 2024-11-30 RX ORDER — LIDOCAINE HYDROCHLORIDE AND EPINEPHRINE 10; 10 MG/ML; UG/ML
10 INJECTION, SOLUTION INFILTRATION; PERINEURAL ONCE
Status: COMPLETED | OUTPATIENT
Start: 2024-11-30 | End: 2024-11-30

## 2024-11-30 RX ORDER — LORAZEPAM 2 MG/ML
INJECTION INTRAMUSCULAR
Status: COMPLETED
Start: 2024-11-30 | End: 2024-11-30

## 2024-11-30 RX ORDER — HALOPERIDOL 5 MG/ML
2 INJECTION INTRAMUSCULAR ONCE
Status: DISCONTINUED | OUTPATIENT
Start: 2024-11-30 | End: 2024-12-01 | Stop reason: HOSPADM

## 2024-11-30 RX ORDER — HALOPERIDOL 5 MG/ML
2 INJECTION INTRAMUSCULAR ONCE
Status: COMPLETED | OUTPATIENT
Start: 2024-11-30 | End: 2024-11-30

## 2024-11-30 RX ORDER — HALOPERIDOL 5 MG/ML
INJECTION INTRAMUSCULAR
Status: COMPLETED
Start: 2024-11-30 | End: 2024-11-30

## 2024-11-30 RX ADMIN — LORAZEPAM 1 MG: 2 INJECTION INTRAMUSCULAR at 20:44

## 2024-11-30 RX ADMIN — HALOPERIDOL 2 MG: 5 INJECTION INTRAMUSCULAR at 20:44

## 2024-11-30 RX ADMIN — LIDOCAINE HYDROCHLORIDE AND EPINEPHRINE 10 ML: 10; 10 INJECTION, SOLUTION INFILTRATION; PERINEURAL at 20:44

## 2024-11-30 RX ADMIN — HALOPERIDOL LACTATE 2 MG: 5 INJECTION, SOLUTION INTRAMUSCULAR at 20:44

## 2024-11-30 RX ADMIN — LORAZEPAM 1 MG: 2 INJECTION INTRAMUSCULAR; INTRAVENOUS at 20:44

## 2024-11-30 ASSESSMENT — FIBROSIS 4 INDEX: FIB4 SCORE: 1.31

## 2024-12-01 VITALS
HEIGHT: 66 IN | RESPIRATION RATE: 18 BRPM | BODY MASS INDEX: 20.89 KG/M2 | HEART RATE: 64 BPM | TEMPERATURE: 96.9 F | SYSTOLIC BLOOD PRESSURE: 98 MMHG | WEIGHT: 130 LBS | DIASTOLIC BLOOD PRESSURE: 67 MMHG | OXYGEN SATURATION: 95 %

## 2024-12-01 NOTE — ED PROVIDER NOTES
ED Provider Note    CHIEF COMPLAINT  Chief Complaint   Patient presents with    Laceration       EXTERNAL RECORDS REVIEWED  Previous ER visits in the setting of alcohol intoxication.  Patient was seen at [facility] in September for alcohol intoxication    HPI/ROS  LIMITATION TO HISTORY   Select: Intoxication  OUTSIDE HISTORIAN(S):  EMS, I was personally at bedside to obtain report    Jhon Aguirre is a 59 y.o. male who presents for evaluation after a ground-level fall.  Patient is agitated and combative and does not provide history.  EMS states that he had an unwitnessed fall.  He was found by [facility] on the ground in between 2 energy boxes with multiple bottles of vodka around him.  Patient was found facedown on the ground.  He is A&O x 2.  EMS noted to pulsatile bleeding head wound and placed a pressure dressing.  Patient is not compliant with for the history.    PAST MEDICAL HISTORY   has a past medical history of Alcohol abuse, Dental disorder, Pain, and Renal failure (2010).    SURGICAL HISTORY   has a past surgical history that includes other; orif, fracture, radius and ulna (10/23/2011); and inguinal hernia repair (3/14/2014).    FAMILY HISTORY  History reviewed. No pertinent family history.    SOCIAL HISTORY  Social History     Tobacco Use    Smoking status: Every Day     Current packs/day: 0.50     Average packs/day: 0.5 packs/day for 20.0 years (10.0 ttl pk-yrs)     Types: Cigarettes    Smokeless tobacco: Never   Vaping Use    Vaping status: Never Used   Substance and Sexual Activity    Alcohol use: Yes     Comment: daily    Drug use: Not Currently     Comment: past mj use    Sexual activity: Not on file       CURRENT MEDICATIONS  Home Medications       Reviewed by Vandana Calvo (Pharmacy Tech) on 11/30/24 at 2312  Med List Status: Unable to Obtain     Medication Last Dose Status   amoxicillin-clavulanate (AUGMENTIN) 875-125 MG Tab  Active   folic acid (FOLVITE) 1 MG  "Tab  Active   multivitamin (THERAGRAN) Tab  Active   nicotine (NICODERM) 21 MG/24HR PATCH 24 HR  Active   omeprazole (PRILOSEC) 20 MG delayed-release capsule  Active   thiamine (THIAMINE) 100 MG tablet  Active                  Audit from Redirected Encounters    **Home medications have not yet been reviewed for this encounter**         ALLERGIES  No Known Allergies    PHYSICAL EXAM  VITAL SIGNS: BP 98/67   Pulse 64   Temp 36.1 °C (96.9 °F) (Temporal)   Resp 18   Ht 1.676 m (5' 6\")   Wt 59 kg (130 lb)   SpO2 95%   BMI 20.98 kg/m²    Constitutional: Awake and alert Nontoxic  HENT: He has 3 cm right-sided facial laceration.  There is pulsatile bleeding from wound.  Eyes: Normal inspection  Neck: Grossly normal range of motion.  Cardiovascular: Normal heart rate, Normal rhythm.    Thorax & Lungs: No respiratory distress  Abdomen: Soft, non-distended, nontender   Skin: No obvious rash.  Extremities: Warm, well perfused. No clubbing, cyanosis, edema   Neurologic: Combative, clear speech moving all extremities symmetrically  Psychiatric: Aggressive    RADIOLOGY/PROCEDURES   I have independently interpreted the diagnostic imaging associated with this visit and am waiting the final reading from the radiologist.   My preliminary interpretation is as follows: No large ICH    Radiologist interpretation:  CT-CSPINE WITHOUT PLUS RECONS   Final Result      No acute abnormality identified.      CT-MAXILLOFACIAL W/O PLUS RECONS   Final Result      No fracture      CT-HEAD W/O   Final Result      1.  No acute intracranial abnormality.   2.  LEFT frontal and parietal encephalomalacia redemonstrated   3.  Senescent changes   4.  RIGHT facial injury                Procedure - Laceration closure  Location: Right Face  Type: Complex    Patient was positioned appropriately, 10cc lidocaine with epinephrine was used as a local anesthetic. 1000cc NaCl was used for irrigation. Patient was sterile draped with wound exposed. 3x  2.0 nylon " sutures were placed with good approximation using a figure of 8 technique. Procedure tolerated without complications.       COURSE & MEDICAL DECISION MAKING    ASSESSMENT, COURSE AND PLAN  Care Narrative: This is a 59-year-old, not anticoagulated with a history of alcohol abuse who presents by EMS after an unwitnessed ground-level fall.  Patient is combative on arrival with obvious signs of head trauma.  Patient was not cooperative, thrashing, moving his head and compromising his safety, safety of staff and obstructing his care.  Patient was chemically sedated with Ativan and Haldol.  I immediately repaired the scalp wound as above with subsequent hemostasis.  He was taken as expeditiously to CT fortunately without evidence of intracranial bleed or skull fracture.  No fracture or subluxation of the cervical spine.  Patient with a documented history of alcohol use and aggressive behavior and I suspect this most likely explains his mental status and behavior.  I have low suspicion for other toxidrome or electrolyte derangement.  Patient metabolized in the ER for several hours with plan to discharge home once awake alert, clinically sober.    Critical care time : Jhon presents with an acute critical illness and in my judgement had significant potential for imminent deterioration. I provided 33 minutes of Critical Care time to this patient, exclusive of any separately billable procedures. This included bedside direction of care, frequent re-evaluations and time of medical documentation.      ED OBS: Yes; I am placing the patient in to an observation status due to a diagnostic uncertainty as well as therapeutic intensity. Patient placed in observation status at  21.26 PM, 11/30/2024.     Observation plan is as follows: Sober reevaluation        DISPOSITION AND DISCUSSIONS  I have discussed management of the patient with the following physicians and ARELIS's:  None    Discussion of management with other [facility] or appropriate  source(s): None     Escalation of care considered, and ultimately not performed:Laboratory analysis he has no signs of systemic illness    Barriers to care at this time, including but not limited to: Patient is homeless.     Decision tools and prescription drugs considered including, but not limited to: None    FINAL DIAGNOSIS  1. Facial laceration, initial encounter Acute   2. Aggressive behavior Acute   3. Closed head injury, initial encounter Acute        Electronically signed by: Helga Dill M.D., 11/30/2024 11:09 PM

## 2024-12-01 NOTE — ED NOTES
Bedside report received from previous shift.   Assumed patient care. Verified patient identification.  Checked on bed, connected to monitor,  with unlabored respirations. Discussed plan of care.   Vital signs is stable. Denied any new complaints.   Gurney in low position, side rail up for pt safety. Call light within reach.   No needs identified at the moment. Instructed to use call light when needed.    Contraptions: IV gauge 18 Lt FA  Alert and Oriented: Subdued  Ambulatory: Not at the moment  Oxygen:  None  Pending: Observation

## 2024-12-01 NOTE — ED NOTES
Pt discharged home. GCS 15. IV discontinued and gauze placed, pt in possession of belongings. Pt provided discharge education and information pertaining to stitch remove. Pt received copy of discharge instructions and verbalized understanding.

## 2024-12-01 NOTE — ED NOTES
Bedside report received from off going RN/tech: Terrell, assumed care of patient.  POC discussed with patient. Call light within reach, all needs addressed at this time.       Fall risk interventions in place: Patient's personal possessions are with in their safe reach, Place socks on patient, Place fall risk sign on patient's door, Give patient urinal if applicable, and Keep floor surfaces clean and dry (all applicable per [facility] Fall risk assessment)   Continuous monitoring: Pulse Ox or Blood Pressure  IVF/IV medications: Not Applicable   Oxygen: Room Air  Bedside sitter: Not Applicable   Isolation: Not Applicable

## 2024-12-01 NOTE — ED NOTES
Pt sleeping, airway patent, rr even and unlabored. no new complaints or distress noted at this time. Rails up times two.

## 2024-12-01 NOTE — ED TRIAGE NOTES
"Chief Complaint   Patient presents with    Laceration     Pt arrives via [facility] with complaint of unwitnessed fall. Pt was found by [employer] [facility] on ground in between two energy boxes with multiples bottles of vodka around him. Per EMS pt was found on ground face down and combative. Pt aox2, airway patent, rr even and unlabored, right arm contracted. Significant bleeding noted to right side of forehead.     BP (!) 151/93   Pulse 98   Resp 18   Ht 1.676 m (5' 6\")   Wt 59 kg (130 lb)   SpO2 96%   BMI 20.98 kg/m²     ERP called to bedside.   "

## 2024-12-01 NOTE — ED NOTES
Pt refused for BG check, pt stated I am not suppose to be in [facility] and I am leaving. Pt redirected to [facility] and informed that RN will call ERP to reevaluate. Pt resist and wants to leave without any further wait. RN made ERP aware of the situation.  Pt refused for vitals check.

## 2024-12-11 ENCOUNTER — HOSPITAL ENCOUNTER (EMERGENCY)
Facility: MEDICAL CENTER | Age: 59
End: 2024-12-11
Attending: STUDENT IN AN ORGANIZED HEALTH CARE EDUCATION/TRAINING PROGRAM
Payer: MEDICAID

## 2024-12-11 VITALS
SYSTOLIC BLOOD PRESSURE: 157 MMHG | RESPIRATION RATE: 16 BRPM | WEIGHT: 143 LBS | OXYGEN SATURATION: 94 % | HEIGHT: 67 IN | BODY MASS INDEX: 22.44 KG/M2 | DIASTOLIC BLOOD PRESSURE: 86 MMHG | TEMPERATURE: 98 F | HEART RATE: 72 BPM

## 2024-12-11 DIAGNOSIS — Z48.02 VISIT FOR SUTURE REMOVAL: ICD-10-CM

## 2024-12-11 PROCEDURE — 99284 EMERGENCY DEPT VISIT MOD MDM: CPT

## 2024-12-11 PROCEDURE — 15853 REMOVAL SUTR/STAPL XREQ ANES: CPT

## 2024-12-11 ASSESSMENT — FIBROSIS 4 INDEX: FIB4 SCORE: 1.31

## 2024-12-12 NOTE — ED TRIAGE NOTES
"Chief Complaint   Patient presents with    Alcohol Intoxication     Pt arrives from [facility] d/t ETOH intoxication. Pt reports drinking \"too much\"       BIB [facility] to [facility], pt on monitor  Pt Reports etoh intoxication and requesting to come to the hospital  Pt arrives GCS 15    Medications given en route:none    Vitals:    12/11/24 1612   BP: (!) 157/86   Pulse: 72   Resp: 16   Temp: 36.7 °C (98 °F)   SpO2: 94%       "

## 2024-12-12 NOTE — ED NOTES
Pt discharged to lobby with steady gait, provided MTM information. GCS 15. Pt in possession of belongings. Pt did not want to wait for dc paperwork. Education on wound care given verbally.    Vitals:    12/11/24 1612   BP: (!) 157/86   Pulse: 72   Resp: 16   Temp: 36.7 °C (98 °F)   SpO2: 94%

## 2024-12-12 NOTE — ED PROVIDER NOTES
"ER Provider Note    Scribed for Dr. Fareed Talley MD. by Evens Brito. 12/11/2024  4:58 PM    Primary Care Provider: Pcp Pt States None    CHIEF COMPLAINT  Chief Complaint   Patient presents with    Alcohol Intoxication     Pt arrives from [facility] d/t ETOH intoxication. Pt reports drinking \"too much\"       EXTERNAL RECORDS REVIEWED  Outpatient Notes Patient was seen at [facility] 9/14/24 for alcohol intoxication.     HPI/ROS  LIMITATION TO HISTORY   Select: : None    OUTSIDE HISTORIAN(S):  None    Jhon Aguirre is a 59 y.o. male who presents to the ED via EMS from [facility] for evaluation of alcohol intoxication. The patient reports sutures to his right forehead and is requesting they be removed. He has no other medical complaints at this time.       PAST MEDICAL HISTORY  Past Medical History:   Diagnosis Date    Alcohol abuse     Dental disorder     poor dentations  broken teeth    Pain     Renal failure 2010    Resolved, due to dehydration       SURGICAL HISTORY  Past Surgical History:   Procedure Laterality Date    INGUINAL HERNIA REPAIR  3/14/2014    Performed by David Ramirez M.D. at SURGERY SAME DAY [facility] ORS    ORIF, FRACTURE, RADIUS AND ULNA  10/23/2011    Performed by GERMAN REILLY at SURGERY [facility] ORS    OTHER      broken jaw       FAMILY HISTORY  No family history noted.     SOCIAL HISTORY   reports that he has been smoking cigarettes. He has a 10 pack-year smoking history. He has never used smokeless tobacco. He reports current alcohol use. He reports that he does not currently use drugs.    CURRENT MEDICATIONS  Discharge Medication List as of 12/11/2024  5:32 PM        CONTINUE these medications which have NOT CHANGED    Details   amoxicillin-clavulanate (AUGMENTIN) 875-125 MG Tab Take 1 Tablet by mouth 2 times a day., Disp-20 Tablet, R-0, Normal      omeprazole (PRILOSEC) 20 MG delayed-release capsule Take 1 Cap by mouth 2 Times a Day., Disp-30 Cap, R-0, Print Rx " "Paper      thiamine (THIAMINE) 100 MG tablet Take 1 Tab by mouth every day., Disp-30 Tab, R-0, Print Rx Paper      multivitamin (THERAGRAN) Tab Take 1 Tab by mouth every day., Disp-30 Tab, R-0, Print Rx Paper      folic acid (FOLVITE) 1 MG Tab Take 1 Tab by mouth every day., Disp-30 Tab, R-0, Print Rx Paper      nicotine (NICODERM) 21 MG/24HR PATCH 24 HR Apply 1 Patch to skin as directed every 24 hours., Disp-30 Patch, R-0, Print Rx Paper             ALLERGIES  Patient has no known allergies.    PHYSICAL EXAM  BP (!) 157/86   Pulse 72   Temp 36.7 °C (98 °F) (Temporal)   Resp 16   Ht 1.702 m (5' 7\")   Wt 64.9 kg (143 lb)   SpO2 94%   BMI 22.40 kg/m²   Physical Exam  Vitals and nursing note reviewed.   Constitutional:       Appearance: He is well-developed.   HENT:      Head: Normocephalic.      Comments: 4 sutures to right forehead with crusted blood over them. No surrounding erythema or warmth.   Cardiovascular:      Rate and Rhythm: Normal rate and regular rhythm.      Heart sounds: No murmur heard.  Pulmonary:      Effort: Pulmonary effort is normal.      Breath sounds: Normal breath sounds.   Abdominal:      Palpations: Abdomen is soft.      Tenderness: There is no abdominal tenderness.   Musculoskeletal:         General: Normal range of motion.      Right lower leg: No edema.      Left lower leg: No edema.   Skin:     General: Skin is warm.   Neurological:      General: No focal deficit present.      Mental Status: He is alert and oriented to person, place, and time.         DIAGNOSTIC STUDIES & PROCEDURES         Suture/ Staple Removal Procedure Note    Indication: Wound healed    Procedure: The patient was placed in the appropriate position and the sutures were removed without difficulty.    Other items: the wound appears well healed and 4 sutures removed     The patient tolerated the procedure well.    Complications: None    Dr. Senait Alejandre as well as myself provided direct oversight of this " procedure performed by medical student/resident as documented above.       COURSE & MEDICAL DECISION MAKING    INITIAL ASSESSMENT AND PLAN  Care Narrative:     ED OBS: No; Patient does not meet criteria for ED Observation.       4:58 PM - Patient seen and evaluated at bedside.  59-year-old male presenting for suture removal.  Patient presented intoxicated from shelter however on my assessment is only mildly intoxicated.  He is asking for removal of stitches that were placed a couple of weeks ago.  The wound appears well-healed there are no signs of infection the sutures were removed as noted above without difficulty and the patient was discharged in good condition wound care precautions given and return precautions given.    5:31 PM - Patient did not want to wait for his discharge paperwork. He ambulates with steady gait and s stable for discharge,     ADDITIONAL PROBLEM LIST AND DISPOSITION  Past Medical History:   Diagnosis Date    Alcohol abuse     Dental disorder     poor dentations  broken teeth    Pain     Renal failure 2010    Resolved, due to dehydration                  DISPOSITION AND DISCUSSIONS  I have discussed management of the patient with the following physicians and ARELIS's: None    Discussion of management with other QHP or appropriate source(s): None     Escalation of care considered, and ultimately not performed: .    Barriers to care at this time, including but not limited to: Patient does not have established PCP and Patient is homeless.       The patient will return for new or worsening symptoms and is stable at the time of discharge.    The patient is referred to a primary physician for blood pressure management, diabetic screening, and for all other preventative health concerns.      DISPOSITION:  Patient will be discharged home in stable condition.    FOLLOW UP:  [facility] Emergency Dept  [address] [id number] [phone]            FINAL IMPRESSION    1. Visit for suture removal        Evens MULTANI (Scribe), am scribing for, and in the presence of, Fareed Talley M.D..    Electronically signed by: Evens Brito (Elizabethibtanja), 12/11/2024    Fareed MULTANI M.D. personally performed the services described in this documentation, as scribed by Evens Brito in my presence, and it is both accurate and complete.    The note accurately reflects work and decisions made by me.  Fareed Talley M.D.  12/12/2024  12:37 AM

## 2025-01-03 PROCEDURE — 99284 EMERGENCY DEPT VISIT MOD MDM: CPT

## 2025-01-04 ENCOUNTER — APPOINTMENT (OUTPATIENT)
Dept: RADIOLOGY | Facility: MEDICAL CENTER | Age: 60
End: 2025-01-04
Attending: STUDENT IN AN ORGANIZED HEALTH CARE EDUCATION/TRAINING PROGRAM
Payer: MEDICAID

## 2025-01-04 ENCOUNTER — HOSPITAL ENCOUNTER (EMERGENCY)
Facility: MEDICAL CENTER | Age: 60
End: 2025-01-04
Attending: STUDENT IN AN ORGANIZED HEALTH CARE EDUCATION/TRAINING PROGRAM
Payer: MEDICAID

## 2025-01-04 VITALS
DIASTOLIC BLOOD PRESSURE: 73 MMHG | SYSTOLIC BLOOD PRESSURE: 125 MMHG | BODY MASS INDEX: 22.44 KG/M2 | HEART RATE: 89 BPM | TEMPERATURE: 96.8 F | HEIGHT: 67 IN | OXYGEN SATURATION: 92 % | WEIGHT: 143 LBS | RESPIRATION RATE: 15 BRPM

## 2025-01-04 DIAGNOSIS — F10.920 ALCOHOLIC INTOXICATION WITHOUT COMPLICATION (HCC): ICD-10-CM

## 2025-01-04 DIAGNOSIS — F17.200 SMOKING: ICD-10-CM

## 2025-01-04 DIAGNOSIS — R46.89 AGGRESSIVE BEHAVIOR: ICD-10-CM

## 2025-01-04 RX ORDER — ACETAMINOPHEN 325 MG/1
650 TABLET ORAL ONCE
Status: DISCONTINUED | OUTPATIENT
Start: 2025-01-04 | End: 2025-01-04 | Stop reason: HOSPADM

## 2025-01-04 ASSESSMENT — FIBROSIS 4 INDEX: FIB4 SCORE: 1.31

## 2025-01-04 NOTE — ED TRIAGE NOTES
"Chief Complaint   Patient presents with    Alcohol Intoxication    T-5000 FALL     BIBA from an apartment complex. Friends called EMS, pt was out drinking and fell down. + HS, + LOC approximately 1 minute, unknown thinners. Pt refusing to answer questions. Pt arrives alert and uncooperative, cursing staff and refusing any interventions. Vitals for EMS are as follows: /91, HR 99, RR- 16. Refused BS check            Medications given en route:none    Ht 1.702 m (5' 7\")   Wt 64.9 kg (143 lb)   BMI 22.40 kg/m²    "

## 2025-01-04 NOTE — ED PROVIDER NOTES
ER Provider Note    Scribed for Miriam rOo D.o. by Monik Jennings. 1/4/2025  12:20 AM    Primary Care Provider: Pcp Pt States None    CHIEF COMPLAINT   Chief Complaint   Patient presents with    Alcohol Intoxication    T-5000 FALL     BIBA from an apartment complex. Friends called EMS, pt was out drinking and fell down. + HS, + LOC approximately 1 minute, unknown thinners. Pt refusing to answer questions. Pt arrives alert and uncooperative, cursing staff and refusing any interventions. Vitals for EMS are as follows: /91, HR 99, RR- 16. Refused BS check     EXTERNAL RECORDS REVIEWED  External ED Note Patient was seen at [facility] ER on 12/27/2024 following a closed head injury.     HPI/ROS  LIMITATION TO HISTORY   Select: Intoxication  OUTSIDE HISTORIAN(S):  None     Jhon Aguirre is a 59 y.o. male brought in by EMS for alcohol intoxication and following fall. Per report, patient is coming from his apartment complex where he had been consuming alcohol and fell subsequently hitting his head. Patient reportedly loss consciousness for approximately one minute. At this time he admits to having right hand pain but does not provide additional information about his pain.  Patient denies taking any medications, it is unknown if he is on blood thinners. Patient denies chest pain, vomiting, or any additional trauma.     PAST MEDICAL HISTORY  Past Medical History:   Diagnosis Date    Alcohol abuse     Dental disorder     poor dentations  broken teeth    Pain     Renal failure 2010    Resolved, due to dehydration       SURGICAL HISTORY  Past Surgical History:   Procedure Laterality Date    INGUINAL HERNIA REPAIR  3/14/2014    Performed by David Ramirez M.D. at SURGERY SAME DAY [facility] ORS    ORIF, FRACTURE, RADIUS AND ULNA  10/23/2011    Performed by GERMAN REILLY at SURGERY [facility] ORS    OTHER      broken jaw       FAMILY HISTORY  No family history on file.    SOCIAL HISTORY   reports  "that he has been smoking cigarettes. He has a 10 pack-year smoking history. He has never used smokeless tobacco. He reports current alcohol use. He reports that he does not currently use drugs.    CURRENT MEDICATIONS  Discharge Medication List as of 1/4/2025  3:43 AM        CONTINUE these medications which have NOT CHANGED    Details   amoxicillin-clavulanate (AUGMENTIN) 875-125 MG Tab Take 1 Tablet by mouth 2 times a day., Disp-20 Tablet, R-0, Normal      omeprazole (PRILOSEC) 20 MG delayed-release capsule Take 1 Cap by mouth 2 Times a Day., Disp-30 Cap, R-0, Print Rx Paper      thiamine (THIAMINE) 100 MG tablet Take 1 Tab by mouth every day., Disp-30 Tab, R-0, Print Rx Paper      multivitamin (THERAGRAN) Tab Take 1 Tab by mouth every day., Disp-30 Tab, R-0, Print Rx Paper      folic acid (FOLVITE) 1 MG Tab Take 1 Tab by mouth every day., Disp-30 Tab, R-0, Print Rx Paper      nicotine (NICODERM) 21 MG/24HR PATCH 24 HR Apply 1 Patch to skin as directed every 24 hours., Disp-30 Patch, R-0, Print Rx Paper             ALLERGIES  Patient has no known allergies.    PHYSICAL EXAM  Ht 1.702 m (5' 7\")   Wt 64.9 kg (143 lb)   BMI 22.40 kg/m²   Pulse oximetry interpretation: I interpret the pulse oximetry as normal.  Constitutional: Disheveled, verbally aggressive, uncooperative  Head: Old abrasion to right eyebrow.  No hemotympanum raccoon eyes or polk signs  HEENT: Normal Conjunctiva.  PERRLA  Neck: Grossly normal range of motion. Airway midline.  No midline neck tenderness or step-offs  Cardiovascular: Normal heart rate, Normal rhythm.  Thorax & Lungs: No respiratory distress. Clear to Auscultation bilaterally.  Abdomen: Normal inspection. Nontender. Nondistended  Skin: No obvious rash.  Back: No midline tenderness, No CVA tenderness.   Musculoskeletal: No obvious deformity. Moves all extremities Well.  Neurologic: A&Ox4.   Psychiatric: Verbally aggressive, uncooperative      DIAGNOSTIC STUDIES  Studies were ordered " and patient refused    COURSE & MEDICAL DECISION MAKING     ASSESSMENT, COURSE AND PLAN  Care Narrative:   12:21 AM  59 y.o. YO male presents for alcohol intoxication and following reported fall.  Afebrile and with normal vital signs  Pertinent exam findings include abrasion over right eyebrow, disheveled, uncooperative, and verbally aggressive behavior.  Otherwise no hemotympanum raccoon eyes or polk signs, no spinal tenderness to palpation, 4 extremity motor and sensation intact.  Differentials considered but not exhaustive list including  Traumatic brain injury, alcohol intoxication, syncope    Cardiac monitoring was reviewed during this ED Visit.  No acute events were obtained although patient did take off his telemetry during his ED stay.    An EKG and CT head and neck were ordered.  Patient is refusing.  Clinically is intoxicated.  I do not see findings of new trauma on exam.  For now we will allow the patient to metabolize as I have low suspicion for any acute intracranial bleeding event.  If he continues to escalate we will plan for chemical sedation.  Will reattempt CTs at a later date.  Patient otherwise with frequent ER visits for similar complaints.    Patient was observed in department for several hours.  He became clinically sober.  He has a steady gait on exam is asking to leave.  Again attempted to discuss CT imaging and he declines.  At this point he exhibits capacity to decline and make this decision.  He will be allowed to discharge.      Miriam Oro DO spent greater than 3 minutes with the patient explaining the importance of smoking cessation.     ADDITIONAL PROBLEM LIST  none    DISPOSITION AND DISCUSSIONS  I have discussed management of the patient with the following physicians and ARELIS's:  none    Discussion of management with other QHP or appropriate source(s): None     Escalation of care considered, and ultimately not performed: after discussion with the patient / family, they have  elected to decline an escalation in care.    Barriers to care at this time, including but not limited to: Patient does not have established PCP, Patient is homeless, and Patient lacks financial resources.     Decision tools and prescription drugs considered including, but not limited to:  None .    FINAL DIANGOSIS  1. Alcoholic intoxication without complication (HCC)    2. Aggressive behavior    3. Smoking       Monik MULTANI (Scribe), am scribing for, and in the presence of, Miriam Oro D.O..    Electronically signed by: Monik Jennings (Scribe), 1/4/2025    Miriam MULTANI D.O. personally performed the services described in this documentation, as scribed by Monik Jennings in my presence, and it is both accurate and complete.      The note accurately reflects work and decisions made by me.  Miriam Oro D.O.  1/4/2025  4:06 AM

## 2025-01-04 NOTE — ED NOTES
Pt awake and oriented. Pt reports he is ready to go home. ERP at bedside for re eval.     Pt discharged . Patient went out of the ER ambulatory with steady gait with  for safe dc to ED exit., alert and oriented x 4, with all belongings.

## 2025-02-27 ENCOUNTER — HOSPITAL ENCOUNTER (EMERGENCY)
Facility: MEDICAL CENTER | Age: 60
End: 2025-02-27
Attending: EMERGENCY MEDICINE
Payer: MEDICAID

## 2025-02-27 VITALS
HEIGHT: 67 IN | SYSTOLIC BLOOD PRESSURE: 124 MMHG | HEART RATE: 94 BPM | DIASTOLIC BLOOD PRESSURE: 84 MMHG | BODY MASS INDEX: 22.44 KG/M2 | RESPIRATION RATE: 16 BRPM | OXYGEN SATURATION: 97 % | WEIGHT: 143 LBS | TEMPERATURE: 98 F

## 2025-02-27 DIAGNOSIS — F10.920 ALCOHOLIC INTOXICATION WITHOUT COMPLICATION (HCC): ICD-10-CM

## 2025-02-27 PROCEDURE — 99284 EMERGENCY DEPT VISIT MOD MDM: CPT

## 2025-02-27 RX ORDER — HALOPERIDOL 5 MG/ML
10 INJECTION INTRAMUSCULAR
Status: DISCONTINUED | OUTPATIENT
Start: 2025-02-27 | End: 2025-02-27 | Stop reason: HOSPADM

## 2025-02-27 ASSESSMENT — FIBROSIS 4 INDEX: FIB4 SCORE: 1.34

## 2025-02-27 NOTE — ED TRIAGE NOTES
"Chief Complaint   Patient presents with    Alcohol Intoxication     BIB EMS. Bystander called. Pt was lying on the side walk asleep with an empty bottle of vodka next to him.   B  Grossly intoxicated, unable to ambulate at this time.      95% 2L O2 via NC.   PIV placed PTA.   EMS gave narcan without response.    /83   Pulse (!) 102   Temp 36.7 °C (98 °F) (Temporal)   Resp 20   Ht 1.702 m (5' 7\")   Wt 64.9 kg (143 lb)   SpO2 91%   BMI 22.40 kg/m²     "

## 2025-02-27 NOTE — ED PROVIDER NOTES
ER Provider Note    Scribed for Demetris Seals M.d. by Lamar Larry. 2025  3:20 PM    Primary Care Provider: None pertinent     CHIEF COMPLAINT   Chief Complaint   Patient presents with    Alcohol Intoxication     BIB EMS. Bystander called. Pt was lying on the side walk asleep with an empty bottle of vodka next to him.   B  Grossly intoxicated, unable to ambulate at this time.      EXTERNAL RECORDS REVIEWED  External ED Note Seen 6 times at [facility]. History of alcohol abuse. A contraction of right hand and elbow as seen on 2025 at [facility] ED.      HPI/ROS  LIMITATION TO HISTORY   Select: Intoxication and Behavior  OUTSIDE HISTORIAN(S):  None    Jhon Aguirre is a 60 y.o. male with a history of alcohol abuse who presents to the ED via EMS for evaluation of alcohol intoxication onset prior to arrival. Unable to obtain history from patient secondary to intoxicated state and behavior. Per nursing note, patient was brought in by EMS, called by a bystander. He was found lying on the sidewalk asleep with an empty bottle of vodka next to him.     PAST MEDICAL HISTORY  Past Medical History:   Diagnosis Date    Alcohol abuse     Dental disorder     poor dentations  broken teeth    Pain     Renal failure     Resolved, due to dehydration       SURGICAL HISTORY  Past Surgical History:   Procedure Laterality Date    INGUINAL HERNIA REPAIR  3/14/2014    Performed by David Ramirez M.D. at SURGERY SAME DAY [facility] ORS    ORIF, FRACTURE, RADIUS AND ULNA  10/23/2011    Performed by GERMAN REILLY at SURGERY [facility] ORS    OTHER      broken jaw       FAMILY HISTORY  None noted     SOCIAL HISTORY   reports that he has been smoking cigarettes. He has a 10 pack-year smoking history. He has never used smokeless tobacco. He reports current alcohol use. He reports that he does not currently use drugs.    CURRENT MEDICATIONS  Previous Medications    AMOXICILLIN-CLAVULANATE  "(AUGMENTIN) 875-125 MG TAB    Take 1 Tablet by mouth 2 times a day.    FOLIC ACID (FOLVITE) 1 MG TAB    Take 1 Tab by mouth every day.    MULTIVITAMIN (THERAGRAN) TAB    Take 1 Tab by mouth every day.    NICOTINE (NICODERM) 21 MG/24HR PATCH 24 HR    Apply 1 Patch to skin as directed every 24 hours.    OMEPRAZOLE (PRILOSEC) 20 MG DELAYED-RELEASE CAPSULE    Take 1 Cap by mouth 2 Times a Day.    THIAMINE (THIAMINE) 100 MG TABLET    Take 1 Tab by mouth every day.       ALLERGIES  Patient has no known allergies.    PHYSICAL EXAM  /83   Pulse (!) 102   Temp 36.7 °C (98 °F) (Temporal)   Resp 20   Ht 1.702 m (5' 7\")   Wt 64.9 kg (143 lb)   SpO2 96%   BMI 22.40 kg/m²   Constitutional: Alert in no apparent distress.  HENT: Normocephalic, Atraumatic, Bilateral external ears normal. Nose normal.   Eyes: Pupils are equal and reactive. Conjunctiva normal, non-icteric.   Heart: Regular rate and rythm, no murmurs.    Lungs: Clear to auscultation bilaterally.  Extremities: Right upper extremity is contracted  Skin: Warm, Dry, No erythema, No rash. Numerous wounds to right elbow in various states of healing, no sutures needed, one wound 3 cm x 3 cm.  Neurologic: Alert, Grossly non-focal. Patient states \"what the fuck\".  Psychiatric: Affect normal, Judgment normal, Mood normal, Appears intoxicated.     DIAGNOSTIC STUDIES        Radiologist interpretation:  No orders to display       COURSE & MEDICAL DECISION MAKING     ASSESSMENT, COURSE AND PLAN  Care Narrative:    3:21 PM - Patient seen and examined at bedside for alcohol intoxication. He is barely responsive in an intoxicated states.    4:10 PM - Patient has been cursing at staff members. Physical exam reveals no evidence of head trauma.  Plan is to discharge after interventions.    Toxic encephalopathy likely given report of ingestion and behaviour in ED c/w intoxication  No trauma reported to suggest ICH or SDH as etiology  Despite labs not being drawn doubt " significant electrolyte etiology given pt w/ ability to torerate PO and return to baseline.   Pt afebrile at this time, doubt infectious etiology of encephalopathy given resolution prior to d/c  Pt counseled on cessation and use in moderation  Pt ambulated w/ baseline gait w/o assistance and is tolerating PO prior to DC  Pt became aggressive with staff.   We attempted to provide patient with clean dry clothes, he began to verbally assault staff members and became physically aggressive while we were attempting to help ensure steady gait with ambulation.       DISPOSITION AND DISCUSSIONS  I have discussed management of the patient with the following physicians and ARELIS's:  None    Discussion of management with other QHP or appropriate source(s): None         FINAL DIANGOSIS  1. Alcoholic intoxication without complication (HCC)         ILamar ([place]), am scribing for, and in the presence of, Demetris Seals M.D..    Electronically signed by: Lamar Larry ([place]), 2/27/2025    IDemetris M.D. personally performed the services described in this documentation, as scribed by Lamar Larry in my presence, and it is both accurate and complete.       The note accurately reflects work and decisions made by me.  Demetris Seals M.D.  2/27/2025  9:01 PM

## 2025-02-28 NOTE — ED NOTES
"Pt awake, sitting up at side of bed. Is alert & wants to go home. Can ambulate independently, but is still wobbly at baseline after stroke. Offered cane, pt yelled \"f*k off\". ERP rounded on pt, pt becoming verbally aggressive. Security called, & pt escorted out of the ED. PIV removed by ERP & dressing applied.   "

## 2025-02-28 NOTE — ED NOTES
Bedside report received from LINDA Cifuentes.   Fall risk precautions in place. Call bell in reach.  Pt on room air, all lines traced.

## 2025-03-07 ENCOUNTER — HOSPITAL ENCOUNTER (EMERGENCY)
Facility: MEDICAL CENTER | Age: 60
End: 2025-03-08
Attending: EMERGENCY MEDICINE
Payer: MEDICAID

## 2025-03-07 ENCOUNTER — APPOINTMENT (OUTPATIENT)
Dept: RADIOLOGY | Facility: MEDICAL CENTER | Age: 60
End: 2025-03-07
Attending: EMERGENCY MEDICINE
Payer: MEDICAID

## 2025-03-07 DIAGNOSIS — S62.663A CLOSED NONDISPLACED FRACTURE OF DISTAL PHALANX OF LEFT MIDDLE FINGER, INITIAL ENCOUNTER: ICD-10-CM
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
## 2025-03-07 DIAGNOSIS — S61.412A LACERATION OF LEFT HAND WITHOUT FOREIGN BODY, INITIAL ENCOUNTER: ICD-10-CM

## 2025-03-07 DIAGNOSIS — S01.81XA FACIAL LACERATION, INITIAL ENCOUNTER: ICD-10-CM

## 2025-03-07 LAB — POC BREATHALIZER: 0.23 PERCENT (ref 0–0.01)

## 2025-03-07 PROCEDURE — 99285 EMERGENCY DEPT VISIT HI MDM: CPT

## 2025-03-07 PROCEDURE — 302970 POC BREATHALIZER: Performed by: EMERGENCY MEDICINE

## 2025-03-07 RX ORDER — ZIPRASIDONE MESYLATE 20 MG/ML
20 INJECTION, POWDER, LYOPHILIZED, FOR SOLUTION INTRAMUSCULAR ONCE
Status: COMPLETED | OUTPATIENT
Start: 2025-03-08 | End: 2025-03-08

## 2025-03-07 ASSESSMENT — FIBROSIS 4 INDEX: FIB4 SCORE: 1.34

## 2025-03-08 ENCOUNTER — APPOINTMENT (OUTPATIENT)
Dept: RADIOLOGY | Facility: MEDICAL CENTER | Age: 60
End: 2025-03-08
Attending: EMERGENCY MEDICINE
Payer: MEDICAID

## 2025-03-08 VITALS
WEIGHT: 143.08 LBS | BODY MASS INDEX: 22.46 KG/M2 | HEIGHT: 67 IN | RESPIRATION RATE: 20 BRPM | SYSTOLIC BLOOD PRESSURE: 122 MMHG | OXYGEN SATURATION: 98 % | TEMPERATURE: 98 F | HEART RATE: 88 BPM | DIASTOLIC BLOOD PRESSURE: 80 MMHG

## 2025-03-08 PROCEDURE — 96372 THER/PROPH/DIAG INJ SC/IM: CPT

## 2025-03-08 PROCEDURE — 73070 X-RAY EXAM OF ELBOW: CPT | Mod: RT

## 2025-03-08 PROCEDURE — 700111 HCHG RX REV CODE 636 W/ 250 OVERRIDE (IP): Mod: JZ,UD | Performed by: EMERGENCY MEDICINE

## 2025-03-08 PROCEDURE — 73120 X-RAY EXAM OF HAND: CPT | Mod: LT

## 2025-03-08 RX ORDER — HALOPERIDOL 5 MG/ML
5 INJECTION INTRAMUSCULAR ONCE
Status: COMPLETED | OUTPATIENT
Start: 2025-03-08 | End: 2025-03-08

## 2025-03-08 RX ORDER — DIPHENHYDRAMINE HYDROCHLORIDE 50 MG/ML
25 INJECTION, SOLUTION INTRAMUSCULAR; INTRAVENOUS ONCE
Status: COMPLETED | OUTPATIENT
Start: 2025-03-08 | End: 2025-03-08

## 2025-03-08 RX ADMIN — DIPHENHYDRAMINE HYDROCHLORIDE 25 MG: 50 INJECTION, SOLUTION INTRAMUSCULAR; INTRAVENOUS at 02:46

## 2025-03-08 RX ADMIN — HALOPERIDOL LACTATE 5 MG: 5 INJECTION, SOLUTION INTRAMUSCULAR at 02:46

## 2025-03-08 RX ADMIN — ZIPRASIDONE MESYLATE 20 MG: 20 INJECTION, POWDER, LYOPHILIZED, FOR SOLUTION INTRAMUSCULAR at 00:18

## 2025-03-08 NOTE — ED NOTES
"RN rounded due to bed alarm going off, pt sitting on edge of bed shouting, \"turn it off!\" When RN educated pt on safety and need for bed alarm pt continuously shouted \"turn it off, I speak [place]inean\". Security called to bedside, MD notified. IM Geodon ordered. Pt assisted back to bed, Pt medicated per MAR.   "

## 2025-03-08 NOTE — ED NOTES
Patient wet the bed with urine, linens changed. Patient refused to change wet clothes    Patient agree to stay in bed, rest and not to leave the room, agree without restraints     4 points soft restraints removed  Side rails up  Warm blankets provided  Floor kept dry and clean    Pulse ox attached  Patient refused blood pressure

## 2025-03-08 NOTE — ED NOTES
Vital signs taken and recorded. Discharge in stable condition ambulatory accompanied by security. Health teachings given to patient  with full understanding of the information given. No personal belongings left.

## 2025-03-08 NOTE — ED NOTES
Bedside report received from off going RN Vaishnavi, assumed care of patient.        Fall risk interventions in place: Patient's personal possessions are with in their safe reach, Place fall risk sign on patient's door, Give patient urinal if applicable, Keep floor surfaces clean and dry, Accompanied to restroom, and Bed Alarm in use (all applicable per [place] Fall risk assessment)   Continuous monitoring: Not Applicable   IVF/IV medications: Not Applicable   Oxygen: Room Air  Bedside sitter: Not Applicable   Isolation: Not Applicable    Patient awake sitting at the edge of his bed, on 4 points soft restraints    Security informed

## 2025-03-08 NOTE — ED NOTES
Security called for discharge assist    Patient provided with sandwich, clean clothes and juice    Patient refused to changed clothes    Wound dressing done on left hand     Patient able to ambulate and discharge   Medications

## 2025-03-08 NOTE — ED NOTES
"Patient states \"stop holding my hand\" \"too many questions I don't want to answer\"    Patient also states \" I want to get out of here, leave me alone and I can walk by myself\"  "

## 2025-03-08 NOTE — ED TRIAGE NOTES
"Chief Complaint   Patient presents with    T-5000 GLF    ALOC     Pt BIBA from [facility], EMS reports pt had GLF unknown what time it happened, unknown if pt had LOC, denies taking blood thinners. Pt sustained lacerated wound at right upper eyebrows, left hand and avulsed wound at right elbow. EMS applied bandage on left hand and right elbow.   Pt denies taking alcohol. Pt soaked with own urine. Pt A0x2.     BP (!) 151/95   Pulse 71   Temp 36.7 °C (98 °F) (Temporal)   Resp 18   Ht 1.702 m (5' 7\")   Wt 64.9 kg (143 lb 1.3 oz)   SpO2 95%   BMI 22.41 kg/m²       Alert and Oriented: x 2  Oxygen Treatment: No  Respirations are even and unlabored.  Pt screaming and yelling when questioned in ED.Security at bedside. Placed bed alarm     "

## 2025-03-08 NOTE — ED PROVIDER NOTES
ED Provider Note    CHIEF COMPLAINT  Chief Complaint   Patient presents with    T-5000 GLF    ALOC     Pt BIBA from [facility], EMS reports pt had GLF unknown what time it happened, unknown if pt had LOC, denies taking blood thinners. Pt sustained lacerated wound at right upper eyebrows, left hand and avulsed wound at right elbow. EMS applied bandage on left hand and right elbow.   Pt denies taking alcohol. Pt soaked with own urine. Pt A0x2.       EXTERNAL RECORDS REVIEWED  Outpatient labs & studies reviewed nonactionable CBC and CMP dated August 7, 2024, potassium 3.2 noted, otherwise unremarkable.    HPI/ROS  LIMITATION TO HISTORY   Select: Behavior  OUTSIDE HISTORIAN(S):  EMS applied bandage to right elbow, left hand.  Patient not anticoagulated, unknown time of fall, denies alcohol use, soaked in own urine.    Jhon Aguirre is a 60 y.o. male who presents for evaluation of injuries.  Patient has history of alcohol abuse, he is poorly cooperative and was apparently found down for unknown amount of time the laceration to right side of his forehead, right elbow, the left hand.  He is noncooperative and declines assessment of the injuries.  Apparently soaked in own urine per EMS.  Bleeding resolved with dressings applied by EMS.    PAST MEDICAL HISTORY   has a past medical history of Alcohol abuse, Dental disorder, Pain, and Renal failure (2010).    SURGICAL HISTORY   has a past surgical history that includes other; orif, fracture, radius and ulna (10/23/2011); and inguinal hernia repair (3/14/2014).    FAMILY HISTORY  No family history on file.    SOCIAL HISTORY  Social History     Tobacco Use    Smoking status: Every Day     Current packs/day: 0.50     Average packs/day: 0.5 packs/day for 20.0 years (10.0 ttl pk-yrs)     Types: Cigarettes    Smokeless tobacco: Never   Vaping Use    Vaping status: Never Used   Substance and Sexual Activity    Alcohol use: Yes     Comment: daily    Drug use: Not  "Currently     Comment: past mj use    Sexual activity: Not on file       CURRENT MEDICATIONS  Home Medications    **Home medications have not yet been reviewed for this encounter**       Audit from Redirected Encounters    **Home medications have not yet been reviewed for this encounter**         ALLERGIES  No Known Allergies    PHYSICAL EXAM  VITAL SIGNS: /79   Pulse 67   Temp 36.7 °C (98 °F) (Temporal)   Resp 18   Ht 1.702 m (5' 7\")   Wt 64.9 kg (143 lb 1.3 oz)   SpO2 94%   BMI 22.41 kg/m²    General: Alert, no acute distress  Skin: Warm, dry, normal for ethnicity  Head: Normocephalic, 2 cm superficial laceration to the right inferior aspect of the forehead, otherwise atraumatic  Neck: Trachea midline  Eye: PERRL, normal conjunctiva  ENMT: Oral mucosa moist, no pharyngeal erythema or exudate  Cardiovascular:  Normal peripheral perfusion  Respiratory:  respirations are non-labored, symmetric chest rise noted  Gastrointestinal: Soft, nontender, non distended  Neurological: Alert and awake but uncooperative, declines assessment of his wounds  Lymphatics: No lymphadenopathy  Psychiatric: Noncooperative, appears clinically intoxicated    EKG/LABS  Results for orders placed or performed during the hospital encounter of 03/07/25   POC BREATHALIZER    Collection Time: 03/07/25 11:14 PM   Result Value Ref Range    POC Breathalizer 0.233 (A) 0.00 - 0.01 Percent      I have independently interpreted this EKG    RADIOLOGY/PROCEDURES   I have independently interpreted the diagnostic imaging associated with this visit and am waiting the final reading from the radiologist.   My preliminary interpretation is as follows: Pending at this time    Radiologist interpretation:  DX-ELBOW-LIMITED 2- RIGHT    (Results Pending)   DX-HAND 2- LEFT    (Results Pending)       COURSE & MEDICAL DECISION MAKING    ASSESSMENT, COURSE AND PLAN  Care Narrative: Not cooperative and intoxicated 60-year-old presents for evaluation of fall " "with injuries to face and upper extremities.  Unfortunately is very uncooperative after my initial assessment and will not allow me to evaluate the injuries.  He is intoxicated and though unpleasant otherwise appropriate.  I reviewed recent CT imaging of the head that is reassuring from February.  Thankfully he is hemodynamically stable though given refusing wound care will need to be observed until he is clinically sober for final disposition with regard to treatment of the injuries.  He is intoxicated and is doing somewhat better Geodon with regard to his aggression.      ED OBS: Yes; I am placing the patient in to an observation status due to a diagnostic uncertainty as well as therapeutic intensity. Patient placed in observation status at 10:15 PM, 3/7/2025.     Observation plan is as follows: Patient aggressive and declines intervention with regard to evaluation of his wounds.  Relates simply \"leave me alone\".  Will continue to observe for clinical improvement.  Have ordered x-ray imaging of right elbow and left hand and breathalyzer and urine drug screen to assess further    2345: Indeed patient breathalyzer is fairly elevated consistent with significant alcohol intoxication.  He is unfortunate more aggressive at this time and is danger for self for possible recurrent fall.  As such have ordered Geodon 20 mg IM.          Patient Vitals for the past 24 hrs:   BP Temp Temp src Pulse Resp SpO2 Height Weight   03/07/25 2325 -- -- -- 67 -- 94 % -- --   03/07/25 2225 127/79 -- -- 66 -- 92 % -- --   03/07/25 2134 -- -- -- -- -- -- 1.702 m (5' 7\") 64.9 kg (143 lb 1.3 oz)   03/07/25 2132 (!) 151/95 36.7 °C (98 °F) Temporal 71 18 95 % -- --   03/07/25 2131 -- -- -- 73 -- 96 % -- --        ADDITIONAL PROBLEMS MANAGED  Upper extremity injuries bilaterally, head injury with laceration, alcohol intoxication    DISPOSITION AND DISCUSSIONS  I have discussed management of the patient with the following physicians and ARELIS's:  " Endorsed to my partner Dr. Lefty Pittman, please see his notations for final disposition.    Discussion of management with other [facility] or appropriate source(s): None     Escalation of care considered, and ultimately not performed:IV fluids and diagnostic imaging    Barriers to care at this time, including but not limited to: Patient does not have established PCP, Patient is homeless, Patient lacks transportation , Patient does not have insurance, Patient had difficult affording medications, and Patient lacks financial resources.     Decision tools and prescription drugs considered including, but not limited to:  NA .    FINAL DIAGNOSIS  1. Fall, initial encounter    2. Facial laceration, initial encounter    3. Laceration of left hand without foreign body, initial encounter    4. Laceration of right elbow, initial encounter         Electronically signed by: Yuliya Fernandes M.D., 3/7/2025 9:51 PM

## 2025-03-08 NOTE — ED NOTES
Xray at bedside, pt attempting to get out of bed again and getting legs stuck in the gurney railing. RN and security at bedside, attempted to redirect pt to lay back in bed so xray could be taken. Pt began shouting and swinging at RN, security. MD notified, soft restraints ordered for pt safety. Pt medicated per MAR.